# Patient Record
Sex: MALE | Race: WHITE | Employment: FULL TIME | URBAN - NONMETROPOLITAN AREA
[De-identification: names, ages, dates, MRNs, and addresses within clinical notes are randomized per-mention and may not be internally consistent; named-entity substitution may affect disease eponyms.]

---

## 2017-01-20 ENCOUNTER — OFFICE VISIT (OUTPATIENT)
Dept: FAMILY MEDICINE | Facility: OTHER | Age: 47
End: 2017-01-20
Payer: COMMERCIAL

## 2017-01-20 VITALS
OXYGEN SATURATION: 97 % | WEIGHT: 315 LBS | SYSTOLIC BLOOD PRESSURE: 132 MMHG | HEIGHT: 68 IN | TEMPERATURE: 97.6 F | HEART RATE: 73 BPM | DIASTOLIC BLOOD PRESSURE: 88 MMHG | BODY MASS INDEX: 47.74 KG/M2

## 2017-01-20 DIAGNOSIS — Z00.00 ROUTINE GENERAL MEDICAL EXAMINATION AT A HEALTH CARE FACILITY: Primary | ICD-10-CM

## 2017-01-20 DIAGNOSIS — D17.1 BENIGN LIPOMATOUS NEOPLASM OF SKIN AND SUBCUTANEOUS TISSUE OF TRUNK: ICD-10-CM

## 2017-01-20 DIAGNOSIS — G47.33 OSA (OBSTRUCTIVE SLEEP APNEA): ICD-10-CM

## 2017-01-20 DIAGNOSIS — E66.01 MORBID OBESITY DUE TO EXCESS CALORIES (H): ICD-10-CM

## 2017-01-20 LAB
ALBUMIN SERPL-MCNC: 3.8 G/DL (ref 3.4–5)
ALBUMIN UR-MCNC: NEGATIVE MG/DL
ALP SERPL-CCNC: 73 U/L (ref 40–150)
ALT SERPL W P-5'-P-CCNC: 44 U/L (ref 0–70)
ANION GAP SERPL CALCULATED.3IONS-SCNC: 5 MMOL/L (ref 3–14)
APPEARANCE UR: CLEAR
AST SERPL W P-5'-P-CCNC: 20 U/L (ref 0–45)
BILIRUB SERPL-MCNC: 0.5 MG/DL (ref 0.2–1.3)
BILIRUB UR QL STRIP: NEGATIVE
BUN SERPL-MCNC: 18 MG/DL (ref 7–30)
CALCIUM SERPL-MCNC: 9.1 MG/DL (ref 8.5–10.1)
CHLORIDE SERPL-SCNC: 102 MMOL/L (ref 94–109)
CHOLEST SERPL-MCNC: 245 MG/DL
CO2 SERPL-SCNC: 34 MMOL/L (ref 20–32)
COLOR UR AUTO: YELLOW
CREAT SERPL-MCNC: 0.92 MG/DL (ref 0.66–1.25)
ERYTHROCYTE [DISTWIDTH] IN BLOOD BY AUTOMATED COUNT: 12.5 % (ref 10–15)
GFR SERPL CREATININE-BSD FRML MDRD: 89 ML/MIN/1.7M2
GLUCOSE SERPL-MCNC: 92 MG/DL (ref 70–99)
GLUCOSE UR STRIP-MCNC: NEGATIVE MG/DL
HCT VFR BLD AUTO: 45.9 % (ref 40–53)
HDLC SERPL-MCNC: 37 MG/DL
HGB BLD-MCNC: 15.5 G/DL (ref 13.3–17.7)
HGB UR QL STRIP: NEGATIVE
KETONES UR STRIP-MCNC: NEGATIVE MG/DL
LDLC SERPL CALC-MCNC: 164 MG/DL
LEUKOCYTE ESTERASE UR QL STRIP: NEGATIVE
MCH RBC QN AUTO: 29.8 PG (ref 26.5–33)
MCHC RBC AUTO-ENTMCNC: 33.8 G/DL (ref 31.5–36.5)
MCV RBC AUTO: 88 FL (ref 78–100)
NITRATE UR QL: NEGATIVE
NONHDLC SERPL-MCNC: 208 MG/DL
PH UR STRIP: 7.5 PH (ref 5–7)
PLATELET # BLD AUTO: 221 10E9/L (ref 150–450)
POTASSIUM SERPL-SCNC: 4.5 MMOL/L (ref 3.4–5.3)
PROT SERPL-MCNC: 7.3 G/DL (ref 6.8–8.8)
RBC # BLD AUTO: 5.21 10E12/L (ref 4.4–5.9)
SODIUM SERPL-SCNC: 141 MMOL/L (ref 133–144)
SP GR UR STRIP: 1.02 (ref 1–1.03)
TRIGL SERPL-MCNC: 220 MG/DL
URN SPEC COLLECT METH UR: ABNORMAL
UROBILINOGEN UR STRIP-ACNC: 0.2 EU/DL (ref 0.2–1)
WBC # BLD AUTO: 8.1 10E9/L (ref 4–11)

## 2017-01-20 PROCEDURE — 85027 COMPLETE CBC AUTOMATED: CPT | Performed by: INTERNAL MEDICINE

## 2017-01-20 PROCEDURE — 81003 URINALYSIS AUTO W/O SCOPE: CPT | Performed by: INTERNAL MEDICINE

## 2017-01-20 PROCEDURE — 99396 PREV VISIT EST AGE 40-64: CPT | Performed by: INTERNAL MEDICINE

## 2017-01-20 PROCEDURE — 80061 LIPID PANEL: CPT | Performed by: INTERNAL MEDICINE

## 2017-01-20 PROCEDURE — 80053 COMPREHEN METABOLIC PANEL: CPT | Performed by: INTERNAL MEDICINE

## 2017-01-20 PROCEDURE — 36415 COLL VENOUS BLD VENIPUNCTURE: CPT | Performed by: INTERNAL MEDICINE

## 2017-01-20 ASSESSMENT — PAIN SCALES - GENERAL: PAINLEVEL: NO PAIN (0)

## 2017-01-20 NOTE — NURSING NOTE
"Chief Complaint   Patient presents with     Physical       Initial /88 mmHg  Pulse 73  Temp(Src) 97.6  F (36.4  C) (Temporal)  Ht 5' 8\" (1.727 m)  Wt 330 lb 8 oz (149.914 kg)  BMI 50.26 kg/m2  SpO2 97% Estimated body mass index is 50.26 kg/(m^2) as calculated from the following:    Height as of this encounter: 5' 8\" (1.727 m).    Weight as of this encounter: 330 lb 8 oz (149.914 kg).  BP completed using cuff size: mic RODARTE      "

## 2017-01-20 NOTE — PROGRESS NOTES
SUBJECTIVE:     CC: Misbah Floyd is an 46 year old male who presents for preventative health visit.     Healthy Habits:    Do you get at least three servings of calcium containing foods daily (dairy, green leafy vegetables, etc.)? yes    Amount of exercise or daily activities, outside of work: none    Problems taking medications regularly No    Medication side effects: No    Have you had an eye exam in the past two years? yes    Do you see a dentist twice per year? yes    Do you have sleep apnea, excessive snoring or daytime drowsiness?yes- snores        Today's PHQ-2 Score:   PHQ-2 ( 1999 [employer]) 1/20/2017 1/7/2016   Q1: Little interest or pleasure in doing things 0 0   Q2: Feeling down, depressed or hopeless 0 0   PHQ-2 Score 0 0       Abuse: Current or Past(Physical, Sexual or Emotional)- No  Do you feel safe in your environment - Yes    Social History   Substance Use Topics     Smoking status: Never Smoker      Smokeless tobacco: Never Used     Alcohol Use: No     The patient does not drink >3 drinks per day nor >7 drinks per week.    Last PSA: No results found for: PSA    No results for input(s): CHOL, HDL, LDL, TRIG, CHOLHDLRATIO, NHDL in the last 07516 hours.    Reviewed orders with patient. Reviewed health maintenance and updated orders accordingly - Yes    All Histories reviewed and updated in Epic.      ROS:  C: NEGATIVE for fever, chills, change in weight. Patient is frequently tired. He wakes up tired and spends his daytime. He occasionally falls asleep at street lights.  I: NEGATIVE for worrisome rashes, moles or lesions  E: NEGATIVE for vision changes or irritation  ENT: NEGATIVE for ear, mouth and throat problems  R: NEGATIVE for significant cough or SOB  CV: NEGATIVE for chest pain, palpitations or peripheral edema  GI: NEGATIVE for nausea, abdominal pain, heartburn, or change in bowel habits   male: negative for dysuria, hematuria, decreased urinary stream, erectile dysfunction, urethral  "discharge  M: NEGATIVE for significant arthralgias or myalgia  N: NEGATIVE for weakness, dizziness or paresthesias  P: NEGATIVE for changes in mood or affect    Problem list, Medication list, Allergies, and Medical/Social/Surgical histories reviewed in [facility] and updated as appropriate.  Labs reviewed in EPIC  BP Readings from Last 3 Encounters:   01/20/17 132/88   01/07/16 132/80   12/07/11 126/78    Wt Readings from Last 3 Encounters:   01/20/17 330 lb 8 oz (149.914 kg)   01/07/16 323 lb (146.512 kg)   02/15/07 270 lb (122.471 kg)                  OBJECTIVE:     /88 mmHg  Pulse 73  Temp(Src) 97.6  F (36.4  C) (Temporal)  Ht 5' 8\" (1.727 m)  Wt 330 lb 8 oz (149.914 kg)  BMI 50.26 kg/m2  SpO2 97%  EXAM:  GENERAL: healthy, alert and no distress. The patient is morbidly obese.  EYES: Eyes grossly normal to inspection, PERRL and conjunctivae and sclerae normal  HENT: ear canals and TM's normal, nose and mouth without ulcers or lesions. Neck is large with a redundant soft palate  NECK: no adenopathy, no asymmetry, masses, or scars and thyroid normal to palpation  RESP: lungs clear to auscultation - no rales, rhonchi or wheezes  CV: regular rate and rhythm, normal S1 S2, no S3 or S4, no murmur, click or rub, no peripheral edema and peripheral pulses strong  ABDOMEN: soft, nontender, no hepatosplenomegaly, no masses and bowel sounds normal  MS: no gross musculoskeletal defects noted, no edema  SKIN: no suspicious lesions or rashes  NEURO: Normal strength and tone, mentation intact and speech normal  PSYCH: mentation appears normal, affect normal/bright    ASSESSMENT/PLAN:         ICD-10-CM    1. Routine general medical examination at a health care facility Z00.00 LIPID REFLEX TO DIRECT LDL PANEL     Comprehensive metabolic panel     *UA reflex to Microscopic and Culture ([facility] (except [place] and [place])     CBC with platelets   2. CITLALLI (obstructive sleep apnea) G47.33 SLEEP " "EVALUATION & MANAGEMENT REFERRAL - ADULT   3. Morbid obesity due to excess calories (H) E66.01        COUNSELING:  Reviewed preventive health counseling, as reflected in patient instructions       Regular exercise       Healthy diet/nutrition       Vision screening       Hearing screening    BP Screening:   Last 3 BP Readings:    BP Readings from Last 3 Encounters:   01/20/17 132/88   01/07/16 132/80   12/07/11 126/78       The following was recommended to the patient:  Re-screen BP within a year and recommended lifestyle modifications       reports that he has never smoked. He has never used smokeless tobacco.    Estimated body mass index is 50.26 kg/(m^2) as calculated from the following:    Height as of this encounter: 5' 8\" (1.727 m).    Weight as of this encounter: 330 lb 8 oz (149.914 kg).   Weight management plan: Discussed healthy diet and exercise guidelines and patient will follow up in 3 months in clinic to re-evaluate.    Counseling Resources:  ATP IV Guidelines  Pooled Cohorts Equation Calculator  FRAX Risk Assessment  ICSI Preventive Guidelines  Dietary Guidelines for Americans, 2010  USDA's MyPlate  ASA Prophylaxis  Lung CA Screening    Leandro Hollis, DO  [facility]  "

## 2017-01-20 NOTE — MR AVS SNAPSHOT
After Visit Summary   1/20/2017    Misbah Floyd    MRN: [id number]           Patient Information     Date Of Birth          1970        Visit Information        Provider Department      1/20/2017 8:20 AM Leandro Hollis DO [facility]        Today's Diagnoses     Routine general medical examination at a health care facility    -  1     CITLALLI (obstructive sleep apnea)         Morbid obesity due to excess calories (H)           Care Instructions      Preventive Health Recommendations  Male Ages 40 to 49    Yearly exam:             See your health care provider every year in order to  o   Review health changes.   o   Discuss preventive care.    o   Review your medicines if your doctor has prescribed any.    You should be tested each year for STDs (sexually transmitted diseases) if you re at risk.     Have a cholesterol test every 5 years.     Have a colonoscopy (test for colon cancer) if someone in your family has had colon cancer or polyps before age 50.     After age 45, have a diabetes test (fasting glucose). If you are at risk for diabetes, you should have this test every 3 years.      Talk with your health care provider about whether or not a prostate cancer screening test (PSA) is right for you.    Shots: Get a flu shot each year. Get a tetanus shot every 10 years.     Nutrition:    Eat at least 5 servings of fruits and vegetables daily.     Eat whole-grain bread, whole-wheat pasta and brown rice instead of white grains and rice.     Talk to your provider about Calcium and Vitamin D.     Lifestyle    Exercise for at least 150 minutes a week (30 minutes a day, 5 days a week). This will help you control your weight and prevent disease.     Limit alcohol to one drink per day.     No smoking.     Wear sunscreen to prevent skin cancer.     See your dentist every six months for an exam and cleaning.            Follow-ups after your visit        Additional Services     SLEEP  EVALUATION & MANAGEMENT REFERRAL - ADULT       Please be aware that coverage of these services is subject to the terms and limitations of your health insurance plan.  Call member services at your health plan with any benefit or coverage questions.      Please bring the following to your appointment:    >>   List of current medications   >>   This referral request   >>   Any documents/labs given to you for this referral    [facility] [phone]  (Age 13 if over 100 lbs and up)                  Your next 10 appointments already scheduled     Jan 30, 2017 11:00 AM   New Sleep Patient with Michael Goldsmith PA-C   [facility] ([facility])    [address] [address] [phone]              Future tests that were ordered for you today     Open Future Orders        Priority Expected Expires Ordered    SLEEP EVALUATION & MANAGEMENT REFERRAL - ADULT Routine  1/20/2018 1/20/2017            Who to contact     If you have questions or need follow up information about today's clinic visit or your schedule please contact [facility] directly at [phone].  Normal or non-critical lab and imaging results will be communicated to you by Drive YOYOhart, letter or phone within 4 business days after the clinic has received the results. If you do not hear from us within 7 days, please contact the clinic through [facility] or phone. If you have a critical or abnormal lab result, we will notify you by phone as soon as possible.  Submit refill requests through Storie or call your pharmacy and they will forward the refill request to us. Please allow 3 business days for your refill to be completed.          Additional Information About Your Visit        Drive [facility]OY[facility]ar[facility] Information     Storie lets you send messages to your doctor, view your test results, renew your prescriptions, schedule appointments and more. To sign up, go to [email or web address]  ". Click on \"Log in\" on the left side of the screen, which will take you to the Welcome page. Then click on \"Sign up Now\" on the right side of the page.     You will be asked to enter the access code listed below, as well as some personal information. Please follow the directions to create your username and password.     Your access code is: [email or web address]  Expires: 2017  9:02 AM     Your access code will  in 90 days. If you need help or a new code, please call your [place] clinic or [phone].        Care EveryWhere ID     This is your Care EveryWhere ID. This could be used by other organizations to access your [place] medical records  [id number]        Your Vitals Were     Pulse Temperature Height BMI (Body Mass Index) Pulse Oximetry       73 97.6  F (36.4  C) (Temporal) 5' 8\" (1.727 m) 50.26 kg/m2 97%        Blood Pressure from Last 3 Encounters:   17 132/88   16 132/80   11 126/78    Weight from Last 3 Encounters:   17 330 lb 8 oz (149.914 kg)   16 323 lb (146.512 kg)   02/15/07 270 lb (122.471 kg)              We Performed the Following     *UA reflex to Microscopic and Culture ([facility] and [facility] (except [place] and [place])     CBC with platelets     Comprehensive metabolic panel     LIPID REFLEX TO DIRECT LDL PANEL        Primary Care Provider Office Phone # Fax #    Tawanda Aguayo [phone]       [facility] [address] [facility] [address]        Thank you!     Thank you for choosing [facility]  for your care. Our goal is always to provide you with excellent care. Hearing back from our patients is one way we can continue to improve our services. Please take a few minutes to complete the written survey that you may receive in the mail after your visit with us. Thank you!             Your Updated Medication List - Protect others around you: Learn how to safely use, store and throw away " your medicines at www.disposemymeds.org.      Notice  As of 1/20/2017  9:02 AM    You have not been prescribed any medications.

## 2017-01-25 NOTE — PROGRESS NOTES
Quick Note:    Please contact the patient and notify him of the following:  The urine sample is negative.  Cholesterol is elevated with an LDL of 164.  Chemistry test demonstrates normal minerals, kidney and liver function.  Blood cell count is normal. No anemia is seen.  Thank you.  DO TORI Muniz    ______

## 2017-01-30 ENCOUNTER — OFFICE VISIT (OUTPATIENT)
Dept: SURGERY | Facility: CLINIC | Age: 47
End: 2017-01-30
Payer: COMMERCIAL

## 2017-01-30 ENCOUNTER — OFFICE VISIT (OUTPATIENT)
Dept: SLEEP MEDICINE | Facility: CLINIC | Age: 47
End: 2017-01-30
Payer: COMMERCIAL

## 2017-01-30 VITALS
HEIGHT: 68 IN | HEART RATE: 67 BPM | DIASTOLIC BLOOD PRESSURE: 73 MMHG | SYSTOLIC BLOOD PRESSURE: 126 MMHG | OXYGEN SATURATION: 96 % | BODY MASS INDEX: 47.74 KG/M2 | WEIGHT: 315 LBS

## 2017-01-30 VITALS — BODY MASS INDEX: 47.74 KG/M2 | HEIGHT: 68 IN | HEART RATE: 96 BPM | WEIGHT: 315 LBS | OXYGEN SATURATION: 97 %

## 2017-01-30 DIAGNOSIS — G47.33 OSA (OBSTRUCTIVE SLEEP APNEA): ICD-10-CM

## 2017-01-30 DIAGNOSIS — E66.01 MORBID OBESITY DUE TO EXCESS CALORIES (H): ICD-10-CM

## 2017-01-30 DIAGNOSIS — R22.41 MASS OF RIGHT HIP REGION: Primary | ICD-10-CM

## 2017-01-30 PROCEDURE — 99203 OFFICE O/P NEW LOW 30 MIN: CPT | Performed by: SPECIALIST

## 2017-01-30 PROCEDURE — 99204 OFFICE O/P NEW MOD 45 MIN: CPT | Performed by: PHYSICIAN ASSISTANT

## 2017-01-30 ASSESSMENT — PAIN SCALES - GENERAL: PAINLEVEL: NO PAIN (0)

## 2017-01-30 NOTE — PROGRESS NOTES
Consult requested by Dr. Hollis    Reason for consultation - right hip mass    HPI:  Patient is a 46-year-old white male with a long-standing history of right hip mass. He states it started out small and has been slowly growing ever since. He now wishes to have it excised. Denies any pain fevers chills or drainage from the site. He now presents for evaluation of right hip mass.    No past medical history on file.    Past Surgical History   Procedure Laterality Date     Hc cystourethroscopy  04/21/2006     Stone basket extraction. Left double-J stent placemnt.     No current outpatient prescriptions on file.     No current facility-administered medications for this visit.        Allergies   Allergen Reactions     Bees Swelling     Social History   Substance Use Topics     Smoking status: Never Smoker      Smokeless tobacco: Never Used     Alcohol Use: No     No family history on file.     ROS: 10 point ROS neg other than the symptoms noted above in the HPI.    PE:  B/P: Data Unavailable, T: Data Unavailable, P: 96, R: Data Unavailable  General: well developed, well nourished obese WM  who appears his stated age  HEENT: NC/AT, EOMI, (-)icterus, (-)injection  Neck: Supple, No JVD  Chest: CTA  Heart: S1, S2, (-)m/r/g  Abd: Soft, non tender, non distended  Ext; Warm, no edema, right hip 44u12v2 cm sq mass, soft, mobile, non tender.  Psych: AAOx3  Neuro: No focal deficits      Impression/plan:  This is a 46-year-old gentleman with a right hip mass that is most likely a lipoma. After discussion with the patient the plan at this time is for an excision under general anesthesia. The procedure, risks, benefits and alternatives were discussed and he agrees to proceed. He'll reschedule near future.    Solis Lopes MD, FACS

## 2017-01-30 NOTE — PROGRESS NOTES
Sleep Consultation:    Date on this visit: 1/30/2017    Misbah Floyd is sent by No ref. provider found for a sleep consultation regarding snoring, daytime sleepiness. .    Primary Physician: Tawanda Aguayo     Misbah Floyd reports nightly snoring, gasping and snorting for many years.     Misbah goes to sleep at 9:00 PM during the week. He wakes up at 3:00AM with an alarm. He falls asleep in 5 minutes.  Misbah denies difficulty falling asleep.  He wakes up 1-2 times a night for 5 minutes before falling back to sleep.  Misbah wakes up to go to the bathroom.  On weekends, Misbah goes to sleep at 9:00 PM.  He wakes up at 8:00 AM without an alarm. He falls asleep in 5 minutes.  Patient gets an average of 5-6 hours of sleep per night.     Patient does not use electronics in bed, watch TV in bed and read in bed.     Misbah does not do shift work.  He works day shifts.      Misbah does snore every night. Patient does have a regular bed partner. There is report of snoring, gasping, choking and snorting.  He does have witnessed apneas. They never sleep separately.  Patient sleeps on his back and side. He has occasional snort arousals, morning dry mouth and morning headaches,. Misbah has no bruxism, sleep walking, sleep talking, dream enactment, sleep paralysis, cataplexy and hypnogogic/hypnopompic hallucinations.    He denies sleep walking, sleep talking, enuresis and sleep terrors as a child.  Misbah denies difficulty breathing through his and her nose, claustrophobia, reflux at night, heartburn and depression.      Misbah has gained 0-5 pounds in the last year.  Patient describes themself as a morning person.  He would prefer to go to sleep at 9:00 PM and wake up at 6:30 AM.  Patient's [place] Sleepiness score 9/24 inconsistent with excessive  daytime sleepiness.      Misbah naps 0 times per day He takes no inadvertant naps.  He admits closing eyes and dozing while driving. The most recent episode was 1  week(s) ago.  Patient was counseled on the importance of driving while alert, to pull over if drowsy, or nap before getting into the vehicle if sleepy.  He uses 2-3 sodas/day. Last caffeine intake is usually before 5 PM.    Allergies:    Allergies   Allergen Reactions     Bees Swelling       Medications:    No current outpatient prescriptions on file.       Problem List:  Patient Active Problem List    Diagnosis Date Noted     Morbid obesity due to excess calories (H) 01/20/2017     Priority: Medium     CITLALLI (obstructive sleep apnea) 01/20/2017     Priority: Medium     Abscess of anal and rectal regions 02/19/2007        Past Medical/Surgical History:  History reviewed. No pertinent past medical history.  Past Surgical History   Procedure Laterality Date     Hc cystourethroscopy  04/21/2006     Stone basket extraction. Left double-J stent placemnt.       Social History:  Social History     Social History     Marital Status:      Spouse Name: N/A     Number of Children: N/A     Years of Education: N/A     Occupational History     Not on file.     Social History Main Topics     Smoking status: Never Smoker      Smokeless tobacco: Never Used     Alcohol Use: No     Drug Use: No     Sexual Activity:     Partners: Female     Birth Control/ Protection: None, Male Surgical     Other Topics Concern     Not on file     Social History Narrative       Family History:  History reviewed. No pertinent family history.    Review of Systems:  A complete review of systems reviewed by me is negative with the exeption of what has been mentioned in the history of present illness.  CONSTITUTIONAL:  POSITIVE for  weight gain  EYES: NEGATIVE for changes in vision, blind spots, double vision.  ENT: NEGATIVE for ear pain, sore throat, sinus pain, post-nasal drip, runny nose, bloody nose  CARDIAC: NEGATIVE for fast heartbeats or fluttering in chest, chest pain or pressure, breathlessness when lying flat, swollen legs or swollen  "feet.  NEUROLOGIC: NEGATIVE headaches, weakness or numbness in the arms or legs.  DERMATOLOGIC:  POSITIVE for  rashes  PULMONARY: NEGATIVE SOB at rest, SOB with activity, dry cough, productive cough, coughing up blood, wheezing or whistling when breathing.    GASTROINTESTINAL: NEGATIVE for nausea or vomitting, loose or watery stools, fat or grease in stools, constipation, abdominal pain, bowel movements black in color or blood noted.  GENITOURINARY: NEGATIVE for pain during urination, blood in urine, urinating more frequently than usual, irregular menstrual periods.  MUSCULOSKELETAL: NEGATIVE for muscle pain, bone or joint pain, swollen joints.  ENDOCRINE: NEGATIVE for increased thirst or urination, diabetes.  LYMPHATIC: NEGATIVE for swollen lymph nodes, lumps or bumps in the breasts or nipple discharge.    Physical Examination:  Vitals: /73 mmHg  Pulse 67  Ht 1.727 m (5' 8\")  Wt 149.687 kg (330 lb)  BMI 50.19 kg/m2  SpO2 96%  BMI= Body mass index is 50.19 kg/(m^2).         No flowsheet data found.    GENERAL APPEARANCE: healthy, alert, no distress and over weight  HENT: nose and mouth without ulcers or lesions and oropharynx crowded  NECK: no adenopathy, no asymmetry, masses, or scars, thyroid normal to palpation and trachea midline and normal to palpation  RESP: lungs clear to auscultation - no rales, rhonchi or wheezes  CV: regular rates and rhythm, normal S1 S2, no S3 or S4 and no murmur, click or rub  MS: extremities normal- no gross deformities noted  PSYCH: mentation appears normal and affect normal/bright  Mallampati Class: III.  Tonsillar Stage: 1  hidden by pillars.    Impression/Plan:  Due to BMI, snoring, witnessed apneas will set up a split night with TCM monitoring.   Literature provided regarding sleep apnea.      He will follow up with me in approximately two weeks after his sleep study has been competed to review the results and discuss plan of care.       Polysomnography " reviewed.  Obstructive sleep apnea reviewed.  Complications of untreated sleep apnea were reviewed.        CC: No ref. provider found

## 2017-01-30 NOTE — PATIENT INSTRUCTIONS
"MY TREATMENT INFORMATION FOR SLEEP APNEA-  Misbah Floyd    DOCTOR : Michael Aguirre  SLEEP CENTER :      MY CONTACT NUMBER:       If I haven't had a sleep study yet, what can I expect?  A personal story from Horacio  https://www.[employer]ouchsurfing[employer].com/watch?v=AxPLmlRpnCs    Am I having a home sleep study?  Here is a video in case you get home and want to make sure you have done it correctly  https://www.[employer]ouchsurfing[employer].com/watch?v=GNI9L4gDpg3&feature=youtu.be    Frequently asked questions:  1. What is Obstructive Sleep Apnea (CITLALLI)? CITLALLI is the most common type of sleep apnea. Apnea literally means, \"without breath.\" It is characterized by repetitive pauses in breathing, despite continued effort to breathe, and is usually associated with a reduction in blood oxygen saturation. Apneas can last 10 to over 60 seconds. It is caused by narrowing or collapse of the upper airway as muscles relax during sleep. Severity of sleep apnea is determined by frequency of breathing events and their effect on your sleep and oxygen levels determined during sleep testing.   2. What are the consequences of CITLALLI? Symptoms include: daytime sleepiness- possibly increasing the risk of falling asleep while driving, unrefreshing/restless sleep, snoring, insomnia, waking frequently to urinate, waking with heartburn or reflux, reduced concentration and memory, and morning headaches. Other health consequences may include development of high blood pressure and other cardiovascular disease in persons who are susceptible. Untreated CITLALLI  can contribute to heart disease, stroke and diabetes.   3. What are the treatment options? In most situations, sleep apnea is a lifelong disease that must be managed with daily therapy. Medications are not effective for sleep apnea and surgery is generally not performed until other therapies have been tried. Therapy is usually tailored to the individual patient based on many factors including your wishes as well as severity of sleep " apnea and severity of obesity. Continuous Positive Airway (CPAP) is the most reliable treatment. An oral device to hold your jaw forward is usually the next most reliable option. Other options include postioning devices (to keep you off your back), weight loss, and surgery including a tongue pacing device. There is more detail about some of these options below.            1. CPAP-  WHAT DOES IT DO AND HOW CAN I LEARN TO WEAR IT?                               BEFORE I START, CAN I WATCH A MOVIE TO GET A PLAN ON HOW TO USE CPAP?  https://www.[employer].com/watch?m=t4P54cz363I      Continuous positive airway pressure, or CPAP, is the most effective treatment for obstructive sleep apnea. It works by blowing room air, through a mask, to hold your throat open. A decision to use CPAP is a major step forward in the pursuit of a healthier life. The successful use of CPAP will help you breathe easier, sleep better and live healthier. You can choose CPAP equipment from any durable medical equipment provider that meets your needs.  Using CPAP can be a positive experience if you keep these oreilly points in mind:  1. Commitment  CPAP is not a quick fix for your problem. It involves a long-term commitment to improve your sleep and your health.    2. Communication  Stay in close communication with both your sleep doctor and your CPAP supplier. Ask lots of questions and seek help when you need it.    3. Consistency  Use CPAP all night, every night and for every nap. You will receive the maximum health benefits from CPAP when you use it every time that you sleep. This will also make it easier for your body to adjust to the treatment.    4. Correction  The first machine and mask that you try may not be the best ones for you. Work with your sleep doctor and your CPAP supplier to make corrections to your equipment selection. Ask about trying a different type of machine or mask if you have ongoing problems. Make sure that your mask is a good  "fit and learn to use your equipment properly.    5. Challenge  Tell a family member or close friend to ask you each morning if you used your CPAP the previous night. Have someone to challenge you to give it your best effort.    6. Connection   Your adjustment to CPAP will be easier if you are able to connect with others who use the same treatment. Ask your sleep doctor if there is a support group in your area for people who have sleep apnea, or look for one on the Internet.  7. Comfort   Increase your level of comfort by using a saline spray, decongestant or heated humidifier if CPAP irritates your nose, mouth or throat. Use your unit's \"ramp\" setting to slowly get used to the air pressure level. There may be soft pads you can buy that will fit over your mask straps. Look on www.CPAP.com for accessories that can help make CPAP use more comfortable.  8. Cleaning   Clean your mask, tubing and headgear on a regular basis. Put this time in your schedule so that you don't forget to do it. Check and replace the filters for your CPAP unit and humidifier.    9. Completion   Although you are never finished with CPAP therapy, you should reward yourself by celebrating the completion of your first month of treatment. Expect this first month to be your hardest period of adjustment. It will involve some trial and error as you find the machine, mask and pressure settings that are right for you.    10. Continuation  After your first month of treatment, continue to make a daily commitment to use your CPAP all night, every night and for every nap.    CPAP-Tips to starting with success:  Begin using your CPAP for short periods of time during the day while you watch TV or read.    Use CPAP every night and for every nap. Using it less often reduces the health benefits and makes it harder for your body to get used to it.    Make small adjustments to your mask, tubing, straps and headgear until you get the right fit. Tightening the mask " may actually worsen the leak.  If it leaves significant marks on your face or irritates the bridge of your nose, it may not be the best mask for you.  Speak with the person who supplied the mask and consider trying other masks. Insurances will allow you to try different masks during the first month of starting CPAP.  Insurance also covers a new mask, hose and filter about every 6 months.    Use a saline nasal spray to ease mild nasal congestion. Neti-Pot or saline nasal rinses may also help. Nasal gel sprays can help reduce nasal dryness.  Biotene mouthwash can be helpful to protect your teeth if you experience frequent dry mouth.  Dry mouth may be a sign of air escaping out of your mouth or out of the mask in the case of a full face mask.  Speak with your provider if you expect that is the case.     Take a nasal decongestant to relieve more severe nasal or sinus congestion.  Do not use Afrin (oxymetazoline) nasal spray more than 3 days in a row.  Speak with your sleep doctor if your nasal congestion is chronic.    Use a heated humidifier that fits your CPAP model to enhance your breathing comfort. Adjust the heat setting up if you get a dry nose or throat, down if you get condensation in the hose or mask.  Position the CPAP lower than you so that any condensation in the hose drains back into the machine rather than towards the mask.    Try a system that uses nasal pillows if traditional masks give you problems.    Clean your mask, tubing and headgear once a week. Make sure the equipment dries fully.    Regularly check and replace the filters for your CPAP unit and humidifier.    Work closely with your sleep provider and your CPAP supplier to make sure that you have the machine, mask and air pressure setting that works best for you. It is better to stop using it and call your provider to solve problems than to lay awake all night frustrated with the device.    BESIDES CPAP, WHAT OTHER THERAPIES ARE THERE?       Positioning Device  Positioning devices are generally used when sleep apnea is mild and only occurs on your back.This example shows a pillow that straps around the waist. It may be appropriate for those whose sleep study shows milder sleep apnea that occurs primarily when lying flat on one's back. Preliminary studies have shown benefit but effectiveness at home may need to be verified by a home sleep test. These devices are generally not covered by medical insurance.                      Oral Appliance  What is oral appliance therapy?  An oral appliance is a small acrylic device that fits over the upper and lower teeth or tongue (similar to an orthodontic retainer or a mouth guard). This device slightly advances the lower jaw or tongue, which moves the base of the tongue forward, opens the airway, improves breathing and can effectively treat snoring and obstructive sleep apnea sleep apnea. The appliance is fabricated and customized by a qualified dentist with experience in treating snoring and sleep apnea. Oral appliances are usually well tolerated and have relatively high compliance by patients1, 2, 3.  When is an oral appliance indicated?  Oral appliance therapy is recommended as a first-line treatment for patients with primary snoring, mild sleep apnea, and for patients with moderate sleep apnea who prefer appliance therapy to use of CPAP4, 5. Severity of sleep apnea is determined by sleep testing and is based on the number of respiratory events per hour of sleep.   How successful is oral appliance therapy?  The success rate of oral appliance therapy in patients with mild sleep apnea is 75-80% while in patients with moderate sleep apnea it is 50-70%. The chance of success in patients with severe sleep apnea is 40-50%. The research also shows that oral appliances have a beneficial effect on the cardiovascular health of CITLALLI patients at the same magnitude as CPAP therapy7.  Oral appliances should be a second-line  treatment in cases of severe sleep apnea, but if not completely successful then a combination therapy utilizing CPAP plus oral appliance therapy may be effective. Oral appliances tend to be effective in a broad range of patients although studies show that the patients who have the highest success are females, younger patients, those with milder disease, and less severe obesity. 3, 6.   The chances of success are lower in patients who have more severe CITLALLI, are older, and those who are morbidly obese.     Example of an oral appliance   Finding a dentist that practices dental sleep medicine  Specific training is available through the American Academy of Dental Sleep Medicine for dentists interested in working in the field of sleep. To find a dentist who is educated in the field of sleep and the use of oral appliances, near you, visit the Web site of the American Academy of Dental Sleep Medicine; also see   http://www.accpstorage.org/newOrganization/patients/oralAppliances.pdf  To search for a dentist certified in these practices:  Http://aadsm.org/FindADentist.aspx?1  1. Britt et al. Objectively measured vs self-reported compliance during oral appliance therapy for sleep-disordered breathing. Chest 2013; 144(5): 0354-6597.  2. Roxanna, et al. Objective measurement of compliance during oral appliance therapy for sleep-disordered breathing. Thorax 2013; 68(1): 91-96.  3. Chantale et al. Mandibular advancement devices in 620 men and women with CITLALLI and snoring: tolerability and predictors of treatment success. Chest 2004; 125: 7246-6015.  4. Yates, et al. Oral appliances for snoring and CITLALLI: a review. Sleep 2006; 29: 244-262.  5. Julia, et al. Oral appliance treatment for CITLALLI: an update. J Clin Sleep Med 2014; 10(2): 215-227.  6. Aristides, et al. Predictors of OSAH treatment outcome. J Dent Res 2007; 86: 2699-5080.      Weight Loss:    Weight management is a personal decision.  If you are interested in  exploring weight loss strategies, the following discussion covers the impact on weight loss on sleep apnea and the approaches that may be successful.    Weight loss decreases severity of sleep apnea in most people with obesity. For those with mild obesity who have developed snoring with weight gain, even 15-30 pound weight loss can improve and occasionally eliminate sleep apnea.  Structured and life-long dietary and health habits are necessary to lose weight and keep healthier weight levels.     Though there may be significant health benefits from weight loss, long-term weight loss is very difficult to achieve- studies show success with dietary management in less than 10% of people. In addition, substantial weight loss may require years of dietary control and may be difficult if patients have severe obesity. In these cases, surgical management may be considered.  Finally, older individuals who have tolerated obesity without health complications may be less likely to benefit from weight loss strategies.    Your BMI is Body mass index is 50.19 kg/(m^2).  Body mass index (BMI) is one way to tell whether you are at a healthy weight, overweight, or obese. It measures your weight in relation to your height.  A BMI of 18.5 to 24.9 is in the healthy range. A person with a BMI of 25 to 29.9 is considered overweight, and someone with a BMI of 30 or greater is considered obese. More than two-thirds of American adults are considered overweight or obese.  Being overweight or obese increases the risk for further weight gain. Excess weight may lead to heart disease and diabetes.  Creating and following plans for healthy eating and physical activity may help you improve your health.  Weight control is part of healthy lifestyle and includes exercise, emotional health, and healthy eating habits. Careful eating habits lifelong are the mainstay of weight control. Though there are significant health benefits from weight loss, long-term  weight loss with diet alone may be very difficult to achieve- studies show long-term success with dietary management in less than 10% of people. Attaining a healthy weight may be especially difficult to achieve in those with severe obesity. In some cases, medications, devices and surgical management might be considered.  What can you do?  If you are overweight or obese and are interested in methods for weight loss, you should discuss this with your provider.     Consider reducing daily calorie intake by 500 calories.     Keep a food journal.     Avoiding skipping meals, consider cutting portions instead.    Diet combined with exercise helps maintain muscle while optimizing fat loss. Strength training is particularly important for building and maintaining muscle mass. Exercise helps reduce stress, increase energy, and improves fitness. Increasing exercise without diet control, however, may not burn enough calories to loose weight.       Start walking three days a week 10-20 minutes at a time    Work towards walking thirty minutes five days a week     Eventually, increase the speed of your walking for 1-2 minutes at time    In addition, we recommend that you review healthy lifestyles and methods for weight loss available through the National Institutes of Health patient information sites:  http://win.niddk.nih.gov/publications/index.htm    And look into health and wellness programs that may be available through your health insurance provider, employer, local community center, or ed[employer]arda club.    Weight management plan: Patient was referred to their PCP to discuss a diet and exercise plan.    Surgery:    Upper Airway Surgery for CITLALLI  Surgery for CITLALLI is a second-line treatment option in the management of sleep apnea.  Surgery should be considered for patients who are having a difficult time tolerating CPAP.    Surgery for CITLALLI is directed at areas that are responsible for narrowing or complete obstruction of the airway  during sleep.  There are a wide range of procedures available to enlarge and/or stabilize the airway to prevent blockage of breathing in the three major areas where it can occur: the palate, tongue, and nasal regions.  Successful surgical treatment depends on the accurate identification of the factors responsible for obstructive sleep apnea in each person.  A personalized approach is required because there is no single treatment that works well for everyone.  Because of anatomic variation, consultation with an examination by a sleep surgeon is a critical first step in determining what surgical options are best for each patient.  In some cases, examination during sedation may be recommended in order to guide the selection of procedures.  Patients will be counseled about risks and benefits as well as the typical recovery course after surgery. Surgery is typically not a cure for a person s CITLALLI.  However, surgery will often significantly improve one s CITLALLI severity (termed  success rate ).  Even in the absence of a cure, surgery will decrease the cardiovascular risk associated with OSA7; improve overall quality of life8 (sleepiness, functionality, sleep quality, etc).          Palate Procedures:  Patients with CITLALLI often have narrowing of their airway in the region of their tonsils and uvula.  The goals of palate procedures are to widen the airway in this region as well as to help the tissues resist collapse.  Modern palate procedure techniques focus on tissue conservation and soft tissue rearrangement, rather than tissue removal.  Often the uvula is preserved in this procedure. Residual sleep apnea is common in patient after pharyngoplasty with an average reduction in sleep apnea events of 33%2.      Tongue Procedures:  While patients are awake, the muscles that surround the throat are active and keep this region open for breathing. These muscles relax during sleep, allowing the tongue and other structures to collapse  and block breathing.  There are several different tongue procedures available.  Selection of a tongue base procedure depends on characteristics seen on physical exam.  Generally, procedures are aimed at removing bulky tissues in this area or preventing the back of the tongue from falling back during sleep.  Success rates for tongue surgery range from 50-62%3.    Hypoglossal Nerve Stimulation:  Hypoglossal nerve stimulation has recently received approval from the [place] Food and Drug Administration for the treatment of obstructive sleep apnea.  This is based on research showing that the system was safe and effective in treating sleep apnea6.  Results showed that the median AHI score decreased 68%, from 29.3 to 9.0. This therapy uses an implant system that senses breathing patterns and delivers mild stimulation to airway muscles, which keeps the airway open during sleep.  The system consists of three fully implanted components: a small generator (similar in size to a pacemaker), a breathing sensor, and a stimulation lead.  Using a small handheld remote, a patient turns the therapy on before bed and off upon awakening.    Candidates for this device must be greater than 22 years of age, have moderate to severe CITLALLI (AHI between 20-65), BMI less than 32, have tried CPAP/oral appliance without success, and have appropriate upper airway anatomy (determined by a sleep endoscopy performed by Dr. Grullon).    Hypoglossal Nerve Stimulation Pathway:    The sleep surgeon s office will work with the patient through the insurance prior-authorization process (including communications and appeals).    Nasal Procedures:  Nasal obstruction can interfere with nasal breathing during the day and night.  Studies have shown that relief of nasal obstruction can improve the ability of some patients to tolerate positive airway pressure therapy for obstructive sleep apnea1.  Treatment options include medications such as nasal saline,  topical corticosteroid and antihistamine sprays, and oral medications such as antihistamines or decongestants. Non-surgical treatments can include external nasal dilators for selected patients. If these are not successful by themselves, surgery can improve the nasal airway either alone or in combination with these other options.      Combination Procedures:  Combination of surgical procedures and other treatments may be recommended, particularly if patients have more than one area of narrowing or persistent positional disease.  The success rate of combination surgery ranges from 66-80%2,3.      1. Pancho TARIQ. The Role of the Nose in Snoring and Obstructive Sleep Apnoea: An Update.  Eur Arch Otorhinolaryngol. 2011; 268: 1365-73.  2.  Colby SM; Julee JA; Mitzy JR; Pallanch JF; Freida MB; Lizet SG; Igor RDZ. Surgical modifications of the upper airway for obstructive sleep apnea in adults: a systematic review and meta-analysis. SLEEP 2010;33(10):1766-6399. Geovanna SMITH. Hypopharyngeal surgery in obstructive sleep apnea: an evidence-based medicine review.  Arch Otolaryngol Head Neck Surg. 2006 Feb;132(2):206-13.  3. Huy YH1, Berto Y, Franklyn PEG. The efficacy of anatomically based multilevel surgery for obstructive sleep apnea. Otolaryngol Head Neck Surg. 2003 Oct;129(4):327-35.  4. Geovanna SMITH, Goldberg A. Hypopharyngeal Surgery in Obstructive Sleep Apnea: An Evidence-Based Medicine Review. Arch Otolaryngol Head Neck Surg. 2006 Feb;132(2):206-13.  5. Jason SWEENEY et al. Upper-Airway Stimulation for Obstructive Sleep Apnea.  N Engl J Med. 2014 Jan 9;370(2):139-49.  6. Pesurekha Y et al. Increased Incidence of Cardiovascular Disease in Middle-aged Men with Obstructive Sleep Apnea. Am J Respir Crit Care Med; 2002 166: 159-165  7. Jaron UMANZOR et al. Studying Life Effects and Effectiveness of Palatopharyngoplasty (SLEEP) study: Subjective Outcomes of Isolated Uvulopalatopharyngoplasty. Otolaryngol Head Neck Surg. 2011; 144:  982-888.

## 2017-01-30 NOTE — MR AVS SNAPSHOT
"              After Visit Summary   1/30/2017    Misbah Floyd    MRN: [id number]           Patient Information     Date Of Birth          1970        Visit Information        Provider Department      1/30/2017 11:00 AM Michael Goldsmith PA-C [facility]        Today's Diagnoses     CITLALLI (obstructive sleep apnea)           Care Instructions    MY TREATMENT INFORMATION FOR SLEEP APNEA-  Misbah Floyd    DOCTOR : Michael Goldsmith  SLEEP CENTER :      MY CONTACT NUMBER:       If I haven't had a sleep study yet, what can I expect?  A personal story from [employer]  https://www.[employer].com/watch?v=AxPLmlRpnCs    Am I having a home sleep study?  Here is a video in case you get home and want to make sure you have done it correctly  https://www.Mint.com/watch?v=IJB5A8aUkk1&feature=youtu.be    Frequently asked questions:  1. What is Obstructive Sleep Apnea (CITLALLI)? CITLALLI is the most common type of sleep apnea. Apnea literally means, \"without breath.\" It is characterized by repetitive pauses in breathing, despite continued effort to breathe, and is usually associated with a reduction in blood oxygen saturation. Apneas can last 10 to over 60 seconds. It is caused by narrowing or collapse of the upper airway as muscles relax during sleep. Severity of sleep apnea is determined by frequency of breathing events and their effect on your sleep and oxygen levels determined during sleep testing.   2. What are the consequences of CITLALLI? Symptoms include: daytime sleepiness- possibly increasing the risk of falling asleep while driving, unrefreshing/restless sleep, snoring, insomnia, waking frequently to urinate, waking with heartburn or reflux, reduced concentration and memory, and morning headaches. Other health consequences may include development of high blood pressure and other cardiovascular disease in persons who are susceptible. Untreated CITLALLI  can contribute to heart disease, stroke and diabetes.   3. What " are the treatment options? In most situations, sleep apnea is a lifelong disease that must be managed with daily therapy. Medications are not effective for sleep apnea and surgery is generally not performed until other therapies have been tried. Therapy is usually tailored to the individual patient based on many factors including your wishes as well as severity of sleep apnea and severity of obesity. Continuous Positive Airway (CPAP) is the most reliable treatment. An oral device to hold your jaw forward is usually the next most reliable option. Other options include postioning devices (to keep you off your back), weight loss, and surgery including a tongue pacing device. There is more detail about some of these options below.            1. CPAP-  WHAT DOES IT DO AND HOW CAN I LEARN TO WEAR IT?                               BEFORE I START, CAN I WATCH A MOVIE TO GET A PLAN ON HOW TO USE CPAP?  https://www.[employer].com/watch?h=r3O22fv024F      Continuous positive airway pressure, or CPAP, is the most effective treatment for obstructive sleep apnea. It works by blowing room air, through a mask, to hold your throat open. A decision to use CPAP is a major step forward in the pursuit of a healthier life. The successful use of CPAP will help you breathe easier, sleep better and live healthier. You can choose CPAP equipment from any durable medical equipment provider that meets your needs.  Using CPAP can be a positive experience if you keep these oreilly points in mind:  1. Commitment  CPAP is not a quick fix for your problem. It involves a long-term commitment to improve your sleep and your health.    2. Communication  Stay in close communication with both your sleep doctor and your CPAP supplier. Ask lots of questions and seek help when you need it.    3. Consistency  Use CPAP all night, every night and for every nap. You will receive the maximum health benefits from CPAP when you use it every time that you sleep. This will  "also make it easier for your body to adjust to the treatment.    4. Correction  The first machine and mask that you try may not be the best ones for you. Work with your sleep doctor and your CPAP supplier to make corrections to your equipment selection. Ask about trying a different type of machine or mask if you have ongoing problems. Make sure that your mask is a good fit and learn to use your equipment properly.    5. Challenge  Tell a family member or close friend to ask you each morning if you used your CPAP the previous night. Have someone to challenge you to give it your best effort.    6. Connection   Your adjustment to CPAP will be easier if you are able to connect with others who use the same treatment. Ask your sleep doctor if there is a support group in your area for people who have sleep apnea, or look for one on the Internet.  7. Comfort   Increase your level of comfort by using a saline spray, decongestant or heated humidifier if CPAP irritates your nose, mouth or throat. Use your unit's \"ramp\" setting to slowly get used to the air pressure level. There may be soft pads you can buy that will fit over your mask straps. Look on www.CPAP.com for accessories that can help make CPAP use more comfortable.  8. Cleaning   Clean your mask, tubing and headgear on a regular basis. Put this time in your schedule so that you don't forget to do it. Check and replace the filters for your CPAP unit and humidifier.    9. Completion   Although you are never finished with CPAP therapy, you should reward yourself by celebrating the completion of your first month of treatment. Expect this first month to be your hardest period of adjustment. It will involve some trial and error as you find the machine, mask and pressure settings that are right for you.    10. Continuation  After your first month of treatment, continue to make a daily commitment to use your CPAP all night, every night and for every nap.    CPAP-Tips to " starting with success:  Begin using your CPAP for short periods of time during the day while you watch TV or read.    Use CPAP every night and for every nap. Using it less often reduces the health benefits and makes it harder for your body to get used to it.    Make small adjustments to your mask, tubing, straps and headgear until you get the right fit. Tightening the mask may actually worsen the leak.  If it leaves significant marks on your face or irritates the bridge of your nose, it may not be the best mask for you.  Speak with the person who supplied the mask and consider trying other masks. Insurances will allow you to try different masks during the first month of starting CPAP.  Insurance also covers a new mask, hose and filter about every 6 months.    Use a saline nasal spray to ease mild nasal congestion. Neti-Pot or saline nasal rinses may also help. Nasal gel sprays can help reduce nasal dryness.  Biotene mouthwash can be helpful to protect your teeth if you experience frequent dry mouth.  Dry mouth may be a sign of air escaping out of your mouth or out of the mask in the case of a full face mask.  Speak with your provider if you expect that is the case.     Take a nasal decongestant to relieve more severe nasal or sinus congestion.  Do not use Afrin (oxymetazoline) nasal spray more than 3 days in a row.  Speak with your sleep doctor if your nasal congestion is chronic.    Use a heated humidifier that fits your CPAP model to enhance your breathing comfort. Adjust the heat setting up if you get a dry nose or throat, down if you get condensation in the hose or mask.  Position the CPAP lower than you so that any condensation in the hose drains back into the machine rather than towards the mask.    Try a system that uses nasal pillows if traditional masks give you problems.    Clean your mask, tubing and headgear once a week. Make sure the equipment dries fully.    Regularly check and replace the filters for  your CPAP unit and humidifier.    Work closely with your sleep provider and your CPAP supplier to make sure that you have the machine, mask and air pressure setting that works best for you. It is better to stop using it and call your provider to solve problems than to lay awake all night frustrated with the device.    BESIDES CPAP, WHAT OTHER THERAPIES ARE THERE?      Positioning Device  Positioning devices are generally used when sleep apnea is mild and only occurs on your back.This example shows a pillow that straps around the waist. It may be appropriate for those whose sleep study shows milder sleep apnea that occurs primarily when lying flat on one's back. Preliminary studies have shown benefit but effectiveness at home may need to be verified by a home sleep test. These devices are generally not covered by medical insurance.                      Oral Appliance  What is oral appliance therapy?  An oral appliance is a small acrylic device that fits over the upper and lower teeth or tongue (similar to an orthodontic retainer or a mouth guard). This device slightly advances the lower jaw or tongue, which moves the base of the tongue forward, opens the airway, improves breathing and can effectively treat snoring and obstructive sleep apnea sleep apnea. The appliance is fabricated and customized by a qualified dentist with experience in treating snoring and sleep apnea. Oral appliances are usually well tolerated and have relatively high compliance by patients1, 2, 3.  When is an oral appliance indicated?  Oral appliance therapy is recommended as a first-line treatment for patients with primary snoring, mild sleep apnea, and for patients with moderate sleep apnea who prefer appliance therapy to use of CPAP4, 5. Severity of sleep apnea is determined by sleep testing and is based on the number of respiratory events per hour of sleep.   How successful is oral appliance therapy?  The success rate of oral appliance  therapy in patients with mild sleep apnea is 75-80% while in patients with moderate sleep apnea it is 50-70%. The chance of success in patients with severe sleep apnea is 40-50%. The research also shows that oral appliances have a beneficial effect on the cardiovascular health of CITLALLI patients at the same magnitude as CPAP therapy7.  Oral appliances should be a second-line treatment in cases of severe sleep apnea, but if not completely successful then a combination therapy utilizing CPAP plus oral appliance therapy may be effective. Oral appliances tend to be effective in a broad range of patients although studies show that the patients who have the highest success are females, younger patients, those with milder disease, and less severe obesity. 3, 6.   The chances of success are lower in patients who have more severe CITLALLI, are older, and those who are morbidly obese.     Example of an oral appliance   Finding a dentist that practices dental sleep medicine  Specific training is available through the American Academy of Dental Sleep Medicine for dentists interested in working in the field of sleep. To find a dentist who is educated in the field of sleep and the use of oral appliances, near you, visit the Web site of the American Academy of Dental Sleep Medicine; also see   http://www.accpstorage.org/newOrganization/patients/oralAppliances.pdf  To search for a dentist certified in these practices:  Http://aadsm.org/FindADentist.aspx?1  1. Britt et al. Objectively measured vs self-reported compliance during oral appliance therapy for sleep-disordered breathing. Chest 2013; 144(5): 9408-7834.  2. Roxanna et al. Objective measurement of compliance during oral appliance therapy for sleep-disordered breathing. Thorax 2013; 68(1): 91-96.  3. Chantale et al. Mandibular advancement devices in 620 men and women with CITLALLI and snoring: tolerability and predictors of treatment success. Chest 2004; 125: 2474-0645.  4.  Milan et al. Oral appliances for snoring and CITALLLI: a review. Sleep 2006; 29: 244-262.  5. Julia et al. Oral appliance treatment for CITLALLI: an update. J Clin Sleep Med 2014; 10(2): 215-227.  6. katia Irving al. Predictors of OSAH treatment outcome. J Dent Res 2007; 86: 7661-1243.      Weight Loss:    Weight management is a personal decision.  If you are interested in exploring weight loss strategies, the following discussion covers the impact on weight loss on sleep apnea and the approaches that may be successful.    Weight loss decreases severity of sleep apnea in most people with obesity. For those with mild obesity who have developed snoring with weight gain, even 15-30 pound weight loss can improve and occasionally eliminate sleep apnea.  Structured and life-long dietary and health habits are necessary to lose weight and keep healthier weight levels.     Though there may be significant health benefits from weight loss, long-term weight loss is very difficult to achieve- studies show success with dietary management in less than 10% of people. In addition, substantial weight loss may require years of dietary control and may be difficult if patients have severe obesity. In these cases, surgical management may be considered.  Finally, older individuals who have tolerated obesity without health complications may be less likely to benefit from weight loss strategies.    Your BMI is Body mass index is 50.19 kg/(m^2).  Body mass index (BMI) is one way to tell whether you are at a healthy weight, overweight, or obese. It measures your weight in relation to your height.  A BMI of 18.5 to 24.9 is in the healthy range. A person with a BMI of 25 to 29.9 is considered overweight, and someone with a BMI of 30 or greater is considered obese. More than two-thirds of American adults are considered overweight or obese.  Being overweight or obese increases the risk for further weight gain. Excess weight may lead to heart  disease and diabetes.  Creating and following plans for healthy eating and physical activity may help you improve your health.  Weight control is part of healthy lifestyle and includes exercise, emotional health, and healthy eating habits. Careful eating habits lifelong are the mainstay of weight control. Though there are significant health benefits from weight loss, long-term weight loss with diet alone may be very difficult to achieve- studies show long-term success with dietary management in less than 10% of people. Attaining a healthy weight may be especially difficult to achieve in those with severe obesity. In some cases, medications, devices and surgical management might be considered.  What can you do?  If you are overweight or obese and are interested in methods for weight loss, you should discuss this with your provider.     Consider reducing daily calorie intake by 500 calories.     Keep a food journal.     Avoiding skipping meals, consider cutting portions instead.    Diet combined with exercise helps maintain muscle while optimizing fat loss. Strength training is particularly important for building and maintaining muscle mass. Exercise helps reduce stress, increase energy, and improves fitness. Increasing exercise without diet control, however, may not burn enough calories to loose weight.       Start walking three days a week 10-20 minutes at a time    Work towards walking thirty minutes five days a week     Eventually, increase the speed of your walking for 1-2 minutes at time    In addition, we recommend that you review healthy lifestyles and methods for weight loss available through the National Institutes of Health patient information sites:  http://win.niddk.nih.gov/publications/index.htm    And look into health and wellness programs that may be available through your health insurance provider, employer, local community center, or eduarda club.    Weight management plan: Patient was referred to  their PCP to discuss a diet and exercise plan.    Surgery:    Upper Airway Surgery for CITLALLI  Surgery for CITLALLI is a second-line treatment option in the management of sleep apnea.  Surgery should be considered for patients who are having a difficult time tolerating CPAP.    Surgery for CITLALLI is directed at areas that are responsible for narrowing or complete obstruction of the airway during sleep.  There are a wide range of procedures available to enlarge and/or stabilize the airway to prevent blockage of breathing in the three major areas where it can occur: the palate, tongue, and nasal regions.  Successful surgical treatment depends on the accurate identification of the factors responsible for obstructive sleep apnea in each person.  A personalized approach is required because there is no single treatment that works well for everyone.  Because of anatomic variation, consultation with an examination by a sleep surgeon is a critical first step in determining what surgical options are best for each patient.  In some cases, examination during sedation may be recommended in order to guide the selection of procedures.  Patients will be counseled about risks and benefits as well as the typical recovery course after surgery. Surgery is typically not a cure for a person s CITLALLI.  However, surgery will often significantly improve one s CITLALLI severity (termed  success rate ).  Even in the absence of a cure, surgery will decrease the cardiovascular risk associated with OSA7; improve overall quality of life8 (sleepiness, functionality, sleep quality, etc).          Palate Procedures:  Patients with CITLALLI often have narrowing of their airway in the region of their tonsils and uvula.  The goals of palate procedures are to widen the airway in this region as well as to help the tissues resist collapse.  Modern palate procedure techniques focus on tissue conservation and soft tissue rearrangement, rather than tissue removal.  Often the uvula  is preserved in this procedure. Residual sleep apnea is common in patient after pharyngoplasty with an average reduction in sleep apnea events of 33%2.      Tongue Procedures:  While patients are awake, the muscles that surround the throat are active and keep this region open for breathing. These muscles relax during sleep, allowing the tongue and other structures to collapse and block breathing.  There are several different tongue procedures available.  Selection of a tongue base procedure depends on characteristics seen on physical exam.  Generally, procedures are aimed at removing bulky tissues in this area or preventing the back of the tongue from falling back during sleep.  Success rates for tongue surgery range from 50-62%3.    Hypoglossal Nerve Stimulation:  Hypoglossal nerve stimulation has recently received approval from the [place] Food and Drug Administration for the treatment of obstructive sleep apnea.  This is based on research showing that the system was safe and effective in treating sleep apnea6.  Results showed that the median AHI score decreased 68%, from 29.3 to 9.0. This therapy uses an implant system that senses breathing patterns and delivers mild stimulation to airway muscles, which keeps the airway open during sleep.  The system consists of three fully implanted components: a small generator (similar in size to a pacemaker), a breathing sensor, and a stimulation lead.  Using a small handheld remote, a patient turns the therapy on before bed and off upon awakening.    Candidates for this device must be greater than 22 years of age, have moderate to severe CITLALLI (AHI between 20-65), BMI less than 32, have tried CPAP/oral appliance without success, and have appropriate upper airway anatomy (determined by a sleep endoscopy performed by Dr. Grullon).    Hypoglossal Nerve Stimulation Pathway:    The sleep surgeon s office will work with the patient through the insurance prior-authorization  process (including communications and appeals).    Nasal Procedures:  Nasal obstruction can interfere with nasal breathing during the day and night.  Studies have shown that relief of nasal obstruction can improve the ability of some patients to tolerate positive airway pressure therapy for obstructive sleep apnea1.  Treatment options include medications such as nasal saline, topical corticosteroid and antihistamine sprays, and oral medications such as antihistamines or decongestants. Non-surgical treatments can include external nasal dilators for selected patients. If these are not successful by themselves, surgery can improve the nasal airway either alone or in combination with these other options.      Combination Procedures:  Combination of surgical procedures and other treatments may be recommended, particularly if patients have more than one area of narrowing or persistent positional disease.  The success rate of combination surgery ranges from 66-80%2,3.      1. Pancho TARIQ. The Role of the Nose in Snoring and Obstructive Sleep Apnoea: An Update.  Eur Arch Otorhinolaryngol. 2011; 268: 1365-73.  2.  Colby SM; Julee JA; Mitzy JR; Pallanch JF; Freida MB; Lizet SG; Igor RDZ. Surgical modifications of the upper airway for obstructive sleep apnea in adults: a systematic review and meta-analysis. SLEEP 2010;33(10):8162-8590. Geovanna SMITH. Hypopharyngeal surgery in obstructive sleep apnea: an evidence-based medicine review.  Arch Otolaryngol Head Neck Surg. 2006 Feb;132(2):206-13.  3. Huy YH1, Berto Y, Franklyn PEG. The efficacy of anatomically based multilevel surgery for obstructive sleep apnea. Otolaryngol Head Neck Surg. 2003 Oct;129(4):327-35.  4. Kezirian E, Goldberg A. Hypopharyngeal Surgery in Obstructive Sleep Apnea: An Evidence-Based Medicine Review. Arch Otolaryngol Head Neck Surg. 2006 Feb;132(2):206-13.  5. Jason SWEENEY et al. Upper-Airway Stimulation for Obstructive Sleep Apnea.  N Engl J Med. 2014 [place]  "9;370(2):139-49.  6. Leona Y et al. Increased Incidence of Cardiovascular Disease in Middle-aged Men with Obstructive Sleep Apnea. Am J Respir Crit Care Med; 2002 166: 159-165  7. Jaron UMANZOR et al. Studying Life Effects and Effectiveness of Palatopharyngoplasty (SLEEP) study: Subjective Outcomes of Isolated Uvulopalatopharyngoplasty. Otolaryngol Head Neck Surg. 2011; 144: 623-631.                    Follow-ups after your visit        Your next 10 appointments already scheduled     Jan 30, 2017  2:30 PM   [facility]    [address] [place] [id number] [phone]              Future tests that were ordered for you today     Open Future Orders        Priority Expected Expires Ordered    Comprehensive Sleep Study Routine  7/29/2017 1/30/2017    ABG-Blood Gas Arterial ([facility] [place]) Routine  3/31/2017 1/30/2017            Who to contact     If you have questions or need follow up information about today's clinic visit or your schedule please contact [facility] directly at [phone].  Normal or non-critical lab and imaging results will be communicated to you by MyChart, letter or phone within 4 business days after the clinic has received the results. If you do not hear from us within 7 days, please contact the clinic through [employer] or phone. If you have a critical or abnormal lab result, we will notify you by phone as soon as possible.  Submit refill requests through [employer] or call your pharmacy and they will forward the refill request to us. Please allow 3 business days for your refill to be completed.          Additional Information About Your Visit        MyChart Information     [employer] lets you send messages to your doctor, view your test results, renew your prescriptions, schedule appointments and more. To sign up, go to [email or web address] . Click on \"Log in\" " "on the left side of the screen, which will take you to the Welcome page. Then click on \"Sign up Now\" on the right side of the page.     You will be asked to enter the access code listed below, as well as some personal information. Please follow the directions to create your username and password.     Your access code is: [email or web address]  Expires: 2017  9:02 AM     Your access code will  in 90 days. If you need help or a new code, please call your [facility] clinic or [phone].        Care EveryWhere ID     This is your Care EveryWhere ID. This could be used by other organizations to access your [facility] medical records  [id number]        Your Vitals Were     Pulse Height BMI (Body Mass Index) Pulse Oximetry          67 1.727 m (5' 8\") 50.19 kg/m2 96%         Blood Pressure from Last 3 Encounters:   17 126/73   17 132/88   16 132/80    Weight from Last 3 Encounters:   17 149.687 kg (330 lb)   17 149.914 kg (330 lb 8 oz)   16 146.512 kg (323 lb)              We Performed the Following     SLEEP EVALUATION & MANAGEMENT REFERRAL - ADULT        Primary Care Provider Office Phone # Fax #    Tawanda Aguayo [phone]       [facility] [address] [facility] [address]        Thank you!     Thank you for choosing [facility]  for your care. Our goal is always to provide you with excellent care. Hearing back from our patients is one way we can continue to improve our services. Please take a few minutes to complete the written survey that you may receive in the mail after your visit with us. Thank you!             Your Updated Medication List - Protect others around you: Learn how to safely use, store and throw away your medicines at www.disposemymeds.org.      Notice  As of 2017 11:53 AM    You have not been prescribed any medications.      "
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
## 2017-01-30 NOTE — NURSING NOTE
"    Chief Complaint   Patient presents with     Mass     right lower hip/groin       Initial Pulse 96  Ht 5' 8\" (1.727 m)  Wt 327 lb 1.6 oz (148.372 kg)  BMI 49.75 kg/m2  SpO2 97% Estimated body mass index is 49.75 kg/(m^2) as calculated from the following:    Height as of this encounter: 5' 8\" (1.727 m).    Weight as of this encounter: 327 lb 1.6 oz (148.372 kg)..  BP completed using cuff size: NA (Not Taken)      Jessenia Kenny CMA  ([facility])      "

## 2017-01-30 NOTE — MR AVS SNAPSHOT
After Visit Summary   1/30/2017    Misbah Floyd    MRN: [id number]           Patient Information     Date Of Birth          1970        Visit Information        Provider Department      1/30/2017 2:30 PM Solis Lopes MD [facility]         Follow-ups after your visit        Your next 10 appointments already scheduled     Feb 03, 2017  3:30 PM   ABG with NL RESPIRATORY THERAPY   [facility] ([facility])    [address] [place] [address]  [place] [address]   [phone]           If on oxygen, wear as prescribed by physician to the test.            Feb 07, 2017  8:00 PM   Psg Split W/Tcm with PH BED 1   [facility] ([facility])    [address] [place] [id number] [phone]            Feb 21, 2017  8:30 AM   Return Sleep Patient with Michael Goldsmith PA-C   [facility] ([facility])    [address] [place] [id number] [phone]              Future tests that were ordered for you today     Open Future Orders        Priority Expected Expires Ordered    Comprehensive Sleep Study Routine  7/29/2017 1/30/2017    ABG-Blood Gas Arterial ([facility]) Routine  3/31/2017 1/30/2017            Who to contact     If you have questions or need follow up information about today's clinic visit or your schedule please contact [facility] directly at [phone].  Normal or non-critical lab and imaging results will be communicated to you by MyChart, letter or phone within 4 business days after the clinic has received the results. If you do not hear from us within 7 days, please contact the clinic through [employer] or phone. If you have a critical or abnormal lab result, we will notify you by phone as soon as possible.  Submit refill requests through [employer] or call your pharmacy and they will forward  "the refill request to us. Please allow 3 business days for your refill to be completed.          Additional Information About Your Visit        MyChart Information     HomeTouch lets you send messages to your doctor, view your test results, renew your prescriptions, schedule appointments and more. To sign up, go to [email or web address] . Click on \"Log in\" on the left side of the screen, which will take you to the Welcome page. Then click on \"Sign up Now\" on the right side of the page.     You will be asked to enter the access code listed below, as well as some personal information. Please follow the directions to create your username and password.     Your access code is: [email or web address]  Expires: 2017  9:02 AM     Your access code will  in 90 days. If you need help or a new code, please call your [place] [facility] clinic or [phone].        Care EveryWhere ID     This is your Care EveryWhere ID. This could be used by other organizations to access your [facility] medical records  [id number]        Your Vitals Were     Pulse Height BMI (Body Mass Index) Pulse Oximetry          96 5' 8\" (1.727 m) 49.75 kg/m2 97%         Blood Pressure from Last 3 Encounters:   17 126/73   17 132/88   16 132/80    Weight from Last 3 Encounters:   17 327 lb 1.6 oz (148.372 kg)   17 330 lb (149.687 kg)   17 330 lb 8 oz (149.914 kg)              Today, you had the following     No orders found for display       Primary Care Provider Office Phone # Fax #    Tawanda Aguayo [phone]       [facility] [address] [facility] [address]        Thank you!     Thank you for choosing [facility]  for your care. Our goal is always to provide you with excellent care. Hearing back from our patients is one way we can continue to improve our services. Please take a few minutes to complete the written survey that you may receive in the mail after your " visit with us. Thank you!             Your Updated Medication List - Protect others around you: Learn how to safely use, store and throw away your medicines at www.disposemymeds.org.      Notice  As of 1/30/2017  2:43 PM    You have not been prescribed any medications.

## 2017-02-01 ENCOUNTER — TELEPHONE (OUTPATIENT)
Dept: SURGERY | Facility: OTHER | Age: 47
End: 2017-02-01

## 2017-02-01 NOTE — TELEPHONE ENCOUNTER
Surgery Scheduled    Date of Surgery 02/24/17 Time of Surgery 1:00pm  Procedure: Excision Right Hip Mass  Hospital/Surgical Facility: [place]  Surgeon: Dr Lopes  Type of Anesthesia Anticipated: LMA  Pre-Op: 02/17/17 with Dr Hollis   Post-Op: 03/06/17 with Dr Lopes  Pre-Certification -to be completed  Consent Signed -to be completed  Hospital Stay -same day procedure    Surgery Packet (and/or) Colonscopy Prep (was given/or mailed) to patient. Patient was also instructed to arrive 1 hour(s) prior to surgery.  Patient understood and agrees to the plan.      Tasha Prather  Specialty

## 2017-02-03 ENCOUNTER — HOSPITAL ENCOUNTER (OUTPATIENT)
Dept: RESPIRATORY THERAPY | Facility: CLINIC | Age: 47
Discharge: HOME | End: 2017-02-03
Attending: PHYSICIAN ASSISTANT | Admitting: PHYSICIAN ASSISTANT
Payer: COMMERCIAL

## 2017-02-03 DIAGNOSIS — G47.33 OSA (OBSTRUCTIVE SLEEP APNEA): ICD-10-CM

## 2017-02-03 LAB
BASE EXCESS BLDA CALC-SCNC: 1.6 MMOL/L
HCO3 BLD-SCNC: 26 MMOL/L (ref 21–28)
O2/TOTAL GAS SETTING VFR VENT: 21 %
PCO2 BLD: 42 MM HG (ref 35–45)
PH BLD: 7.41 PH (ref 7.35–7.45)
PO2 BLD: 78 MM HG (ref 80–105)

## 2017-02-03 PROCEDURE — 82803 BLOOD GASES ANY COMBINATION: CPT | Performed by: PHYSICIAN ASSISTANT

## 2017-02-03 PROCEDURE — 36600 WITHDRAWAL OF ARTERIAL BLOOD: CPT

## 2017-02-03 NOTE — LETTER
[facility]  [address] [place] [address]  [phone]             February 7, 2017    Misbah Floyd  [address] [facility] [address]              Dear Misbah,    The results of your recent lab tests were Normal. Enclosed is a copy of the values and results of your recent labs. It was a pleasure to see you at your last apptointment.    If you have questions or concerns, please call myself or my nurse at [phone].    Sincerely,      Michael Goldsmith PA/as CmA        Results for orders placed or performed during the hospital encounter of 02/03/17   ABG-Blood Gas Arterial ([facility] / [facility])   Result Value Ref Range    pH Arterial 7.41 7.35 - 7.45 pH    pCO2 Arterial 42 35 - 45 mm Hg    pO2 Arterial 78 (L) 80 - 105 mm Hg    Bicarbonate Arterial 26 21 - 28 mmol/L    Base Excess Art 1.6 mmol/L    FIO2 21

## 2017-02-07 ENCOUNTER — THERAPY VISIT (OUTPATIENT)
Dept: SLEEP MEDICINE | Facility: CLINIC | Age: 47
End: 2017-02-07
Payer: COMMERCIAL

## 2017-02-07 PROCEDURE — 95811 POLYSOM 6/>YRS CPAP 4/> PARM: CPT | Performed by: OTOLARYNGOLOGY

## 2017-02-07 NOTE — ADDENDUM NOTE
Encounter addended by: [facility] AprilTAISHA on: 2/7/2017 11:05 AM<BR>     Documentation filed: Letters, Message

## 2017-02-08 NOTE — PROGRESS NOTES
Sleep Tech Comments      STUDY TYPE: Split night, routine montage      SLEEP AID: none      PAP ACCLIMATION: Pt. trialed CPAP @ 4 cm for approximately 15 minutes. Masks trialed were a full nasal and nasal pillows. Pt. preferred a nasal mask overall.         RESPIRATORY EVENTS (BASELINE):     obstructive hypopneas      SNORING:   loud and continuous      TCO2 MONITORING and ABGs:   ABG-Blood Gas Arterial ([facility]) [LAB76] (Order [id number])    Exam Information       Exam Date Exam Time Accession # Results       2/3/17  3:27 PM [id number]     Component Results       Component Value Ref Range & Units Status      pH Arterial 7.41 7.35 - 7.45 pH Final      pCO2 Arterial 42 35 - 45 mm Hg Final      pO2 Arterial 78 (L) 80 - 105 mm Hg Final      Bicarbonate Arterial 26 21 - 28 mmol/L Final      Base Excess Art 1.6 mmol/L Final      Comment:       Reference range:  -9.0 to 1.8      FIO2 21  Final  Levels during testing:  Starting TCO2 @ 46 mmHg   Highest TCO2 @ 59 mmHg   Ending  TCO2 @ 51 mmHg      SUPPLEMENTAL O2:   none      HOB ELEVATION:   Flat with 1 pillow      TITRATION:   Started CPAP with heated humidity @ 4 cm H2O. Pt. was intolerant of the nasal mask, so a change to full face mask was made as pt. stated he felt he needed to exhale with his mouth. Still intolerant to CPAP, so change to bi-level with pt. commenting he at least felt now he could breathe. Starting with 8/4 cm and gradually increasing in 1 cm increments to a final setting of 16/11 cm H2O with REM supine sleep.      FINAL MASK TYPE/SIZE: ResMed FF Quattro (L)      PT IMPRESSION of PAP Tx: Pt. felt he got  a little more sleep, and not as tired.       SLEEP STAGES:   N1, N2, N3, REM      ECG:   NSR      MOVEMENTS:    Movements were associated with arousals. Pt. slept supine all night.      BEHAVIORS:   Pt. complied with all requests and behaved appropriately.

## 2017-02-17 ENCOUNTER — OFFICE VISIT (OUTPATIENT)
Dept: FAMILY MEDICINE | Facility: OTHER | Age: 47
End: 2017-02-17
Payer: COMMERCIAL

## 2017-02-17 VITALS
HEART RATE: 93 BPM | WEIGHT: 315 LBS | DIASTOLIC BLOOD PRESSURE: 68 MMHG | TEMPERATURE: 97.9 F | OXYGEN SATURATION: 96 % | SYSTOLIC BLOOD PRESSURE: 114 MMHG | BODY MASS INDEX: 48.96 KG/M2

## 2017-02-17 DIAGNOSIS — Z01.818 PREOP GENERAL PHYSICAL EXAM: Primary | ICD-10-CM

## 2017-02-17 DIAGNOSIS — Z23 NEED FOR TDAP VACCINATION: ICD-10-CM

## 2017-02-17 DIAGNOSIS — G47.33 OSA (OBSTRUCTIVE SLEEP APNEA): ICD-10-CM

## 2017-02-17 DIAGNOSIS — R22.41 MASS OF RIGHT HIP REGION: ICD-10-CM

## 2017-02-17 DIAGNOSIS — E66.01 MORBID OBESITY DUE TO EXCESS CALORIES (H): ICD-10-CM

## 2017-02-17 PROCEDURE — 99214 OFFICE O/P EST MOD 30 MIN: CPT | Mod: 25 | Performed by: INTERNAL MEDICINE

## 2017-02-17 PROCEDURE — 93000 ELECTROCARDIOGRAM COMPLETE: CPT | Performed by: INTERNAL MEDICINE

## 2017-02-17 PROCEDURE — 90715 TDAP VACCINE 7 YRS/> IM: CPT | Performed by: INTERNAL MEDICINE

## 2017-02-17 PROCEDURE — 90471 IMMUNIZATION ADMIN: CPT | Performed by: INTERNAL MEDICINE

## 2017-02-17 ASSESSMENT — PAIN SCALES - GENERAL: PAINLEVEL: NO PAIN (0)

## 2017-02-17 NOTE — PROGRESS NOTES
[facility]  [address] [facility] [address]  [phone]  Dept: [phone]    PRE-OP EVALUATION:  Today's date: 2017    Misbah Floyd (: 1970) presents for pre-operative evaluation assessment as requested by Dr. Lopes.  He requires evaluation and anesthesia risk assessment prior to undergoing surgery/procedure for treatment of hip mass .  Proposed procedure: EXCISE MASS HIP    Date of Surgery/ Procedure: 2017  Time of Surgery/ Procedure: 1:00  Hospital/Surgical Facility: [facility]  Primary Physician: Tawanda Aguayo  Type of Anesthesia Anticipated: General    Patient has a Health Care Directive or Living Will:  NO    1. NO - Do you have a history of heart attack, stroke, stent, bypass or surgery on an artery in the head, neck, heart or legs?  2. NO - Do you ever have any pain or discomfort in your chest?  3. NO - Do you have a history of  Heart Failure?  4. NO - Are you troubled by shortness of breath when: walking on the level, up a slight hill or at night?  5. NO - Do you currently have a cold, bronchitis or other respiratory infection?  6. NO - Do you have a cough, shortness of breath or wheezing?  7. NO - Do you sometimes get pains in the calves of your legs when you walk?  8. NO - Do you or anyone in your family have previous history of blood clots?  9. NO - Do you or does anyone in your family have a serious bleeding problem such as prolonged bleeding following surgeries or cuts?  10. NO - Have you ever had problems with anemia or been told to take iron pills?  11. NO - Have you had any abnormal blood loss such as black, tarry or bloody stools, or abnormal vaginal bleeding?  12. NO - Have you ever had a blood transfusion?  13. NO - Have you or any of your relatives ever had problems with anesthesia?  14. YES - DO YOU HAVE SLEEP APNEA, EXCESSIVE SNORING OR DAYTIME DROWSINESS? Possible sleep apnea  15. NO - Do you have any prosthetic heart  valves?  16. NO - Do you have prosthetic joints?  17. NO - Is there any chance that you may be pregnant?      HPI:                                                      Brief HPI related to upcoming procedure: The patient has a large soft tissue lesion on his lateral right hip. Concerns regarding the possibility of malignancy are entertained. He is now scheduled for surgical excision requiring general anesthesia. Risks and benefits of the procedure have been explained to him by the surgeon and are again reiterated today.      See problem list for active medical problems.  Problems all longstanding and stable, except as noted/documented.  See ROS for pertinent symptoms related to these conditions.                                                                                                  .    MEDICAL HISTORY:                                                      Patient Active Problem List    Diagnosis Date Noted     Morbid obesity due to excess calories (H) 01/20/2017     Priority: Medium     CITLALLI (obstructive sleep apnea) 01/20/2017     Priority: Medium     Abscess of anal and rectal regions 02/19/2007      No past medical history on file.  Past Surgical History   Procedure Laterality Date     Hc cystourethroscopy  04/21/2006     Stone basket extraction. Left double-J stent placemnt.     No current outpatient prescriptions on file.     OTC products: None, except as noted above    Allergies   Allergen Reactions     Bees Swelling      Latex Allergy: NO    Social History   Substance Use Topics     Smoking status: Never Smoker     Smokeless tobacco: Never Used     Alcohol use No     History   Drug Use No       REVIEW OF SYSTEMS:                                                    C: NEGATIVE for fever, chills, change in weight  I: NEGATIVE for worrisome rashes, moles or lesions  E: NEGATIVE for vision changes or irritation  E/M: NEGATIVE for ear, mouth and throat problems  R: NEGATIVE for significant cough or SOB  B:  NEGATIVE for masses, tenderness or discharge  CV: NEGATIVE for chest pain, palpitations or peripheral edema  GI: NEGATIVE for nausea, abdominal pain, heartburn, or change in bowel habits  : NEGATIVE for frequency, dysuria, or hematuria  M: NEGATIVE for significant arthralgias or myalgia  MUSCULOSKELETAL: Patient has a large soft tissue lesion on his right hip as described.  E: NEGATIVE for temperature intolerance, skin/hair changes  H: NEGATIVE for bleeding problems  P: NEGATIVE for changes in mood or affect    EXAM:                                                    /68 (BP Location: Left arm, Patient Position: Chair, Cuff Size: Adult Large)  Pulse 93  Temp 97.9  F (36.6  C) (Temporal)  Wt (!) 322 lb (146.1 kg)  SpO2 96%  BMI 48.96 kg/m2    GENERAL APPEARANCE: healthy, alert and no distress     EYES: EOMI, - PERRL     HENT: ear canals and TM's normal and nose and mouth without ulcers or lesions     NECK: no adenopathy, no asymmetry, masses, or scars and thyroid normal to palpation     RESP: lungs clear to auscultation - no rales, rhonchi or wheezes     CV: regular rates and rhythm, normal S1 S2, no S3 or S4 and no murmur, click or rub -     ABDOMEN:  soft, nontender, no HSM or masses and bowel sounds normal     MS: extremities normal- no gross deformities noted, no varicosities, normal muscle tone, trigger points the soft tissue mass on the right hip is noted. Measuring approximately 6 cm in diameter.    SKIN: no suspicious lesions or rashes     NEURO: Normal strength and tone, sensory exam grossly normal, mentation intact and speech normal     PSYCH: mentation appears normal. and affect normal/bright     LYMPHATICS: No axillary, cervical, inguinal, or supraclavicular nodes    DIAGNOSTICS:                                                    Recent laboratory work reviewed. It is unremarkable.        Component Value Flag Ref Range Units Status Collected Lab   WBC 8.1  4.0 - 11.0 10e9/L Final 01/20/2017  8:58  AM [facility] lab   RBC Count 5.21  4.4 - 5.9 10e12/L Final 01/20/2017  8:58 AM [facility] lab   Hemoglobin 15.5  13.3 - 17.7 g/dL Final 01/20/2017  8:58 AM [facility] lab   Hematocrit 45.9  40.0 - 53.0 % Final 01/20/2017  8:58 AM [facility] lab   MCV 88  78 - 100 fl Final 01/20/2017  8:58 AM [facility] lab   MCH 29.8  26.5 - 33.0 pg Final 01/20/2017  8:58 AM [facility] lab   MCHC 33.8  31.5 - 36.5 g/dL Final 01/20/2017  8:58 AM [facility] lab   RDW 12.5  10.0 - 15.0 % Final 01/20/2017  8:58 AM [facility] lab   Platelet Count 221  150 - 450 10e9/L Final 01/20/2017  8:58 AM [facility] lab     Component Value Flag Ref Range Units Status Collected Lab   Sodium 141  133 - 144 mmol/L Final 01/20/2017  8:58 AM [facility] Lab   Potassium 4.5  3.4 - 5.3 mmol/L Final 01/20/2017  8:58 AM [facility] Lab   Chloride 102  94 - 109 mmol/L Final 01/20/2017  8:58 AM [facility] Lab   Carbon Dioxide 34 (H) 20 - 32 mmol/L Final 01/20/2017  8:58 AM [facility] Lab   Anion Gap 5  3 - 14 mmol/L Final 01/20/2017  8:58 AM [facility] Lab   Glucose 92  70 - 99 mg/dL Final 01/20/2017  8:58 AM [facility] Lab   Urea Nitrogen 18  7 - 30 mg/dL Final 01/20/2017  8:58 AM [facility] Lab   Creatinine 0.92  0.66 - 1.25 mg/dL Final 01/20/2017  8:58 AM [facility] Lab   GFR Estimate 89  >60 mL/min/1.7m2 Final 01/20/2017  8:58 AM [facility] Lab   Comment:   Non  GFR Calc   GFR Estimate If Black   >60 mL/min/1.7m2 Final 01/20/2017  8:58 AM [facility]   >90    GFR Calc      Calcium 9.1  8.5 - 10.1 mg/dL Final 01/20/2017  8:58 AM [facility]   Bilirubin Total 0.5  0.2 - 1.3 mg/dL Final 01/20/2017  8:58 AM [facility]   Albumin 3.8  3.4 - 5.0 g/dL Final 01/20/2017  8:58 AM [facility]   Protein Total 7.3  6.8 - 8.8 g/dL Final 01/20/2017  8:58 AM [facility]   Alkaline Phosphatase 73  40 - 150 U/L Final 01/20/2017  8:58 AM [facility] Lab   ALT 44  0 - 70 U/L Final 01/20/2017  8:58 AM [facility]   AST 20  0 - 45 U/L Final 01/20/2017  8:58 AM [facility]     Component Value Flag Ref Range Units Status Collected Lab   Color Urine Yellow    Final 01/20/2017  9:08  AM [facility] lab   Appearance Urine Clear    Final 01/20/2017  9:08 AM [facility] lab   Glucose Urine Negative  NEG mg/dL Final 01/20/2017  9:08 AM [facility] lab   Bilirubin Urine Negative  NEG  Final 01/20/2017  9:08 AM [facility] lab   Ketones Urine Negative  NEG mg/dL Final 01/20/2017  9:08 AM [facility] lab   Specific [place] Urine 1.020  1.003 - 1.035  Final 01/20/2017  9:08 AM fn [facility] lab   Blood Urine Negative  NEG  Final 01/20/2017  9:08 AM fn [facility] lab   pH Urine 7.5 (H) 5.0 - 7.0 pH Final 01/20/2017  9:08 AM fn [facility] lab   Protein Albumin Urine Negative  NEG mg/dL Final 01/20/2017  9:08 AM fn [facility] lab   Urobilinogen Urine 0.2  0.2 - 1.0 EU/dL Final 01/20/2017  9:08 AM fn [facility] lab   Nitrite Urine Negative  NEG  Final 01/20/2017  9:08 AM fn [facility] lab   Leukocyte Esterase Urine Negative  NEG  Final 01/20/2017  9:08 AM [facility] lab   Source Midstream Urine    Final 01/20/2017  9:08 AM fn [facility] lab         EKG review demonstrates a normal sinus rhythm without evidence of ischemia or arrhythmia.    IMPRESSION:                                                    Reason for surgery/procedure: Progressively enlarging tumor on the right hip requiring surgical excision  Diagnosis/reason for consult: Perioperative risk assessment in a 46-year-old gentleman with:   1. Mass of right hip region    - EKG 12-lead complete w/read - Clinics    2. Preop general physical exam      3. CITLALLI (obstructive sleep apnea)      4. Morbid obesity due to excess calories (H)      5. Need for Tdap vaccination    - TDAP (ADACEL AGES 11-64)  - ADMIN 1st VACCINE    The proposed surgical procedure is considered INTERMEDIATE risk.    REVISED CARDIAC RISK INDEX  The patient has the following serious cardiovascular risks for perioperative complications such as (MI, PE, VFib and 3  AV Block):  No serious cardiac risks  INTERPRETATION: 1 risks: Class II (low risk - 0.9% complication rate)    The patient has the following additional risks for perioperative complications:  No  identified additional risks    No diagnosis found.    RECOMMENDATIONS:                                                          {IMPORTANT - Medications  Patient takes no medications      APPROVAL GIVEN to proceed with proposed procedure, without further diagnostic evaluation       Signed Electronically by: Leandro Hollis DO    Copy of this evaluation report is provided to requesting physician.    [facility] Preop Guidelines

## 2017-02-17 NOTE — MR AVS SNAPSHOT
After Visit Summary   2/17/2017    Misbah Floyd    MRN: [id number]           Patient Information     Date Of Birth          1970        Visit Information        Provider Department      2/17/2017 2:20 PM Leandro Hollis DO [facility]        Today's Diagnoses     Preop general physical exam    -  1    Mass of right hip region        CITLALLI (obstructive sleep apnea)        Morbid obesity due to excess calories (H)        Need for Tdap vaccination          Care Instructions      Before Your Surgery      Call your surgeon if there is any change in your health. This includes signs of a cold or flu (such as a sore throat, runny nose, cough, rash or fever).    Do not smoke, drink alcohol or take over the counter medicine (unless your surgeon or primary care doctor tells you to) for the 24 hours before and after surgery.    If you take prescribed drugs: Follow your doctor s orders about which medicines to take and which to stop until after surgery.    Eating and drinking prior to surgery: follow the instructions from your surgeon    Take a shower or bath the night before surgery. Use the soap your surgeon gave you to gently clean your skin. If you do not have soap from your surgeon, use your regular soap. Do not shave or scrub the surgery site.  Wear clean pajamas and have clean sheets on your bed.         Follow-ups after your visit        Your next 10 appointments already scheduled     Feb 21, 2017  8:30 AM CST   Return Sleep Patient with Michael Goldsmith PA-C   [place] [facility] ([facility])    [address] [place] [address]   [phone]            Feb 24, 2017   Procedure with Solis Lopes MD   [facility] ([facility])    [address] [place] [address]   [phone]           From [address]: Exit at [address] on south side of [place]. Turn right on [address].  "Turn left at stoplight on [address]. [facility] will be in view two blocks ahead            Mar 06, 2017  2:30 PM CST   Return Visit with Solis Lopes MD   [facility] ([facility])    [address] [address] [phone]              Who to contact     If you have questions or need follow up information about today's clinic visit or your schedule please contact [facility] directly at [phone].  Normal or non-critical lab and imaging results will be communicated to you by Pinocciohart, letter or phone within 4 business days after the clinic has received the results. If you do not hear from us within 7 days, please contact the clinic through eEyet or phone. If you have a critical or abnormal lab result, we will notify you by phone as soon as possible.  Submit refill requests through Niupai or call your pharmacy and they will forward the refill request to us. Please allow 3 business days for your refill to be completed.          Additional Information About Your Visit        PinoccioharCelePost Information     Niupai lets you send messages to your doctor, view your test results, renew your prescriptions, schedule appointments and more. To sign up, go to [email or web address] . Click on \"Log in\" on the left side of the screen, which will take you to the Welcome page. Then click on \"Sign up Now\" on the right side of the page.     You will be asked to enter the access code listed below, as well as some personal information. Please follow the directions to create your username and password.     Your access code is: [email or web address]  Expires: 2017  9:02 AM     Your access code will  in 90 days. If you need help or a new code, please call your [facility] or [phone].        Care EveryWhere ID     This is your Care EveryWhere ID. This could be used by other organizations to access your [place] medical records  [id number]      "   Your Vitals Were     Pulse Temperature Pulse Oximetry BMI (Body Mass Index)          93 97.9  F (36.6  C) (Temporal) 96% 48.96 kg/m2         Blood Pressure from Last 3 Encounters:   02/17/17 114/68   01/30/17 126/73   01/20/17 132/88    Weight from Last 3 Encounters:   02/17/17 (!) 322 lb (146.1 kg)   01/30/17 (!) 327 lb 1.6 oz (148.4 kg)   01/30/17 (!) 330 lb (149.7 kg)              We Performed the Following     ADMIN 1st VACCINE     EKG 12-lead complete w/read - Clinics     TDAP (ADACEL AGES 11-64)        Primary Care Provider Office Phone # Fax #    Tawanda Aguayo [phone]       [facility] [address] [facility] [address]        Thank you!     Thank you for choosing [facility]  for your care. Our goal is always to provide you with excellent care. Hearing back from our patients is one way we can continue to improve our services. Please take a few minutes to complete the written survey that you may receive in the mail after your visit with us. Thank you!             Your Updated Medication List - Protect others around you: Learn how to safely use, store and throw away your medicines at www.disposemymeds.org.      Notice  As of 2/17/2017 11:59 PM    You have not been prescribed any medications.

## 2017-02-17 NOTE — NURSING NOTE
"Chief Complaint   Patient presents with     Pre-Op Exam       Initial /68 (BP Location: Left arm, Patient Position: Chair, Cuff Size: Adult Large)  Pulse 93  Temp 97.9  F (36.6  C) (Temporal)  Wt (!) 322 lb (146.1 kg)  SpO2 96%  BMI 48.96 kg/m2 Estimated body mass index is 48.96 kg/(m^2) as calculated from the following:    Height as of 1/30/17: 5' 8\" (1.727 m).    Weight as of this encounter: 322 lb (146.1 kg).  Medication Reconciliation: complete   Deepa RODARTE      "

## 2017-02-21 ENCOUNTER — OFFICE VISIT (OUTPATIENT)
Dept: SLEEP MEDICINE | Facility: CLINIC | Age: 47
End: 2017-02-21
Payer: COMMERCIAL

## 2017-02-21 VITALS
DIASTOLIC BLOOD PRESSURE: 66 MMHG | OXYGEN SATURATION: 96 % | BODY MASS INDEX: 47.74 KG/M2 | HEART RATE: 69 BPM | WEIGHT: 315 LBS | HEIGHT: 68 IN | SYSTOLIC BLOOD PRESSURE: 130 MMHG

## 2017-02-21 DIAGNOSIS — G47.33 OSA (OBSTRUCTIVE SLEEP APNEA): Primary | ICD-10-CM

## 2017-02-21 PROCEDURE — 99213 OFFICE O/P EST LOW 20 MIN: CPT | Performed by: PHYSICIAN ASSISTANT

## 2017-02-21 NOTE — PATIENT INSTRUCTIONS

## 2017-02-21 NOTE — PROGRESS NOTES
Sleep Study Follow-Up Visit:    Date on this visit: 2/21/2017    Misbah Floyd comes in today for follow-up of his sleep study done on 2/7/2017 at [facility] for excessive daytime sleepiness and possible sleep apnea.    Sleep latency 5.5 minutes without Ambien.  REM achieved.   REM latency 50.5 minutes.  Sleep efficiency 86.8%. Total sleep time 118 minutes.    Sleep architecture:  Stage 1, 6.4% (5%), stage 2, 81.4% (45-55%), stage 3, 7.2% (15-20%), stage REM, 5.1% (20-25%).  AHI was 96.1, with desaturations. .2.  REM .0, consistent with severe REM CITLALLI.  Supine .2, consistent with severe SUPINE CITLALLI.  Periodic Limb Movement Index 0/hour.       CPAP titration:  Sleep latency 31 minutes.  REM latency 14 minutes.  Sleep efficiency 82.3%. Total sleep time 351.5 minutes. Sleep architecture:  Stage 1, 5.8% (5%), stage 2, 54.8% (45-55%), stage 3, 0% (15-20%), stage REM, 39.4% (20-25%).  BIPAP was titrated to 16/11 cm with the elimination of apneas ( AHI 2.5), hypopneas and desaturations. Supine REM was noted at this pressure.  Periodic Limb Movement Index 0/hour.     These findings were reviewed with patient.     Past medical/surgical history, family history, social history, medications and allergies were reviewed.      Problem List:  Patient Active Problem List    Diagnosis Date Noted     Morbid obesity due to excess calories (H) 01/20/2017     Priority: Medium     CITLALLI (obstructive sleep apnea) 01/20/2017     Priority: Medium     Abscess of anal and rectal regions 02/19/2007        Impression/Plan:  Severe sleep apnea, obesity hypoventilation syndrome. Will set up BIPAP 16/11, oximetry after on BIPAP.   He will follow up with me in about 4 week(s).     Twenty-five minutes spent with patient, all of which were spent face-to-face counseling, consulting, coordinating plan of care.      CC: Tawanda Aguayo

## 2017-02-21 NOTE — MR AVS SNAPSHOT
After Visit Summary   2/21/2017    Misbah Floyd    MRN: [id number]           Patient Information     Date Of Birth          1970        Visit Information        Provider Department      2/21/2017 8:30 AM Michael Goldsmith PA-C [place] [facility]        Today's Diagnoses     CITLALLI (obstructive sleep apnea)    -  1      Care Instructions      Your BMI is There is no height or weight on file to calculate BMI.  Weight management is a personal decision.  If you are interested in exploring weight loss strategies, the following discussion covers the approaches that may be successful. Body mass index (BMI) is one way to tell whether you are at a healthy weight, overweight, or obese. It measures your weight in relation to your height.  A BMI of 18.5 to 24.9 is in the healthy range. A person with a BMI of 25 to 29.9 is considered overweight, and someone with a BMI of 30 or greater is considered obese. More than two-thirds of American adults are considered overweight or obese.  Being overweight or obese increases the risk for further weight gain. Excess weight may lead to heart disease and diabetes.  Creating and following plans for healthy eating and physical activity may help you improve your health.  Weight control is part of healthy lifestyle and includes exercise, emotional health, and healthy eating habits. Careful eating habits lifelong are the mainstay of weight control. Though there are significant health benefits from weight loss, long-term weight loss with diet alone may be very difficult to achieve- studies show long-term success with dietary management in less than 10% of people. Attaining a healthy weight may be especially difficult to achieve in those with severe obesity. In some cases, medications, devices and surgical management might be considered.  What can you do?  If you are overweight or obese and are interested in methods for weight loss, you should discuss this  with your provider.     Consider reducing daily calorie intake by 500 calories.     Keep a food journal.     Avoiding skipping meals, consider cutting portions instead.    Diet combined with exercise helps maintain muscle while optimizing fat loss. Strength training is particularly important for building and maintaining muscle mass. Exercise helps reduce stress, increase energy, and improves fitness. Increasing exercise without diet control, however, may not burn enough calories to loose weight.       Start walking three days a week 10-20 minutes at a time    Work towards walking thirty minutes five days a week     Eventually, increase the speed of your walking for 1-2 minutes at time    In addition, we recommend that you review healthy lifestyles and methods for weight loss available through the National Institutes of Health patient information sites:  http://win.niddk.nih.gov/publications/index.htm    And look into health and wellness programs that may be available through your health insurance provider, employer, local community center, or eduarda club.    Weight management plan: Patient was referred to their PCP to discuss a diet and exercise plan.            Follow-ups after your visit        Follow-up notes from your care team     Return in about 4 weeks (around 3/21/2017).      Your next 10 appointments already scheduled     Feb 22, 2017  4:00 PM CST   Oximetry  with PH BED 3   [place] [facility] ([facility])    [address] [place] [id number] [phone]            Feb 23, 2017  4:00 PM CST   Oximetry Drop Off with PH BED 3   [facility] ([facility])    [address] [place] [address] [phone]            Feb 24, 2017   Procedure with Solis Lopes MD   [facility] ([facility])    [address] [place] [address] Dr Jamil BENDER [address]   [phone]           From [address]:  Exit at [address] on south side of [place]. Turn right on [address] Turn left at stoplight on [address]. [facility] will be in view two blocks ahead            Mar 02, 2017  2:30 PM CST   PAP SETUP with [place] SLEEP CENTER DME   [facility] ([facility])    [address] [facility] [facility] [address]   [phone]            Mar 06, 2017  2:30 PM CST   Return Visit with Solis Lopes MD   [facility]    [address] [place] [address]   [phone]            Apr 04, 2017  8:00 AM CDT   Return Sleep Patient with Michael Goldsmith PA-C   [facility] ([facility])    [address] [place] [address]   [phone]              Future tests that were ordered for you today     Open Future Orders        Priority Expected Expires Ordered    Overnight oximetry study Routine  8/20/2017 2/21/2017            Who to contact     If you have questions or need follow up information about today's clinic visit or your schedule please contact [facility] directly at [phone].  Normal or non-critical lab and imaging results will be communicated to you by MyChart, letter or phone within 4 business days after the clinic has received the results. If you do not hear from us within 7 days, please contact the clinic through Trendablhart or phone. If you have a critical or abnormal lab result, we will notify you by phone as soon as possible.  Submit refill requests through Razor Insights or call your pharmacy and they will forward the refill request to us. Please allow 3 business days for your refill to be completed.          Additional Information About Your Visit        Razor Insights Information     Razor Insights lets you send messages to your doctor, view your test results, renew your prescriptions, schedule appointments and more. To sign up, go to  [email or web address] . Click on \"Log in\" on the left side of the screen, which will take you to the Welcome page. Then click on \"Sign up Now\" on the right side of the page.     You will be asked to enter the access code listed below, as well as some personal information. Please follow the directions to create your username and password.     Your access code is: [email or web address]  Expires: 2017  9:02 AM     Your access code will  in 90 days. If you need help or a new code, please call your [place] clinic or [phone].        Care EveryWhere ID     This is your Care EveryWhere ID. This could be used by other organizations to access your [place] medical records  [id number]        Your Vitals Were     Pulse Height Pulse Oximetry BMI (Body Mass Index)          69 1.727 m (5' 8\") 96% 48.66 kg/m2         Blood Pressure from Last 3 Encounters:   17 130/66   17 114/68   17 126/73    Weight from Last 3 Encounters:   17 (!) 145.2 kg (320 lb)   17 (!) 146.1 kg (322 lb)   17 (!) 148.4 kg (327 lb 1.6 oz)              We Performed the Following     Comprehensive DME        Primary Care Provider Office Phone # Fax #    Tawanda Aguayo [phone]       [place] [address] [facility] [address]        Thank you!     Thank you for choosing [place] [facility]  for your care. Our goal is always to provide you with excellent care. Hearing back from our patients is one way we can continue to improve our services. Please take a few minutes to complete the written survey that you may receive in the mail after your visit with us. Thank you!             Your Updated Medication List - Protect others around you: Learn how to safely use, store and throw away your medicines at www.disposemymeds.org.      Notice  As of 2017 11:00 AM    You have not been prescribed any medications.      "

## 2017-02-21 NOTE — NURSING NOTE
"Chief Complaint   Patient presents with     Sleep Problem     go over psg results       Initial /66  Pulse 69  Ht 1.727 m (5' 8\")  Wt (!) 145.2 kg (320 lb)  SpO2 96%  BMI 48.66 kg/m2 Estimated body mass index is 48.66 kg/(m^2) as calculated from the following:    Height as of this encounter: 1.727 m (5' 8\").    Weight as of this encounter: 145.2 kg (320 lb).  Medication Reconciliation: complete    "

## 2017-02-22 ENCOUNTER — OFFICE VISIT (OUTPATIENT)
Dept: SLEEP MEDICINE | Facility: CLINIC | Age: 47
End: 2017-02-22
Payer: COMMERCIAL

## 2017-02-22 DIAGNOSIS — G47.33 OSA (OBSTRUCTIVE SLEEP APNEA): Primary | ICD-10-CM

## 2017-02-22 PROCEDURE — 99207 ZZC DROP WITH A PROCEDURE: CPT | Mod: 25

## 2017-02-22 NOTE — MR AVS SNAPSHOT
After Visit Summary   2/22/2017    Misbah Floyd    MRN: [id number]           Patient Information     Date Of Birth          1970        Visit Information        Provider Department      2/22/2017 4:00 PM PH BED 3 [facility]        Today's Diagnoses     CITLALLI (obstructive sleep apnea)    -  1       Follow-ups after your visit        Your next 10 appointments already scheduled     Feb 22, 2017  4:00 PM CST   Oximetry  with PH BED 3   [facility] ([facility])    [address] [place] [address]   [phone]            Feb 23, 2017  4:00 PM CST   Oximetry Drop Off with PH BED 3   [facility] ([facility])    [address] [place] [address]   [phone]            Feb 24, 2017   Procedure with Solis Lopes MD   [facility] ([facility])    [address] [place] [address]   [phone]           From [address]: Exit at [address] on south side of [place]. Turn right on [address]. Turn left at stoplight on [address]. [facility] will be in view two blocks ahead            Mar 02, 2017  2:30 PM CST   PAP SETUP with  SLEEP CENTER DME   [facility] ([facility])    [address] [place] [address]   [phone]            Mar 06, 2017  2:30 PM CST   Return Visit with Solis Lopes MD   [facility] ([facility])    [address] [place] [address]   [phone]            Apr 04, 2017  8:00 AM CDT   Return Sleep Patient with Michael Goldsmith PA-C   [facility] ([facility])    [address] [place] [address]   [phone]              Future tests that were ordered for you today     Open Future Orders        Priority Expected Expires Ordered     "Overnight oximetry study Routine  2017            Who to contact     If you have questions or need follow up information about today's clinic visit or your schedule please contact [facility] directly at [phone].  Normal or non-critical lab and imaging results will be communicated to you by trustedsafehart, letter or phone within 4 business days after the clinic has received the results. If you do not hear from us within 7 days, please contact the clinic through trustedsafehart or phone. If you have a critical or abnormal lab result, we will notify you by phone as soon as possible.  Submit refill requests through avandeo or call your pharmacy and they will forward the refill request to us. Please allow 3 business days for your refill to be completed.          Additional Information About Your Visit        trustedsafeharSynereca Pharmaceuticals Information     avandeo lets you send messages to your doctor, view your test results, renew your prescriptions, schedule appointments and more. To sign up, go to [email or web address] . Click on \"Log in\" on the left side of the screen, which will take you to the Welcome page. Then click on \"Sign up Now\" on the right side of the page.     You will be asked to enter the access code listed below, as well as some personal information. Please follow the directions to create your username and password.     Your access code is: [email or web address]  Expires: 2017  9:02 AM     Your access code will  in 90 days. If you need help or a new code, please call your [place] clinic or [phone].        Care EveryWhere ID     This is your Care EveryWhere ID. This could be used by other organizations to access your [place] medical records  [id number]         Blood Pressure from Last 3 Encounters:   17 130/66   17 114/68   17 126/73    Weight from Last 3 Encounters:   17 (!) 145.2 kg (320 lb)   17 (!) 146.1 kg (322 lb)   17 (!) 148.4 kg (327 lb 1.6 oz) "              Today, you had the following     No orders found for display       Primary Care Provider Office Phone # Fax #    Tawanda Aguayo [phone]       [facility] [address] [facility] [address]        Thank you!     Thank you for choosing [facility]  for your care. Our goal is always to provide you with excellent care. Hearing back from our patients is one way we can continue to improve our services. Please take a few minutes to complete the written survey that you may receive in the mail after your visit with us. Thank you!             Your Updated Medication List - Protect others around you: Learn how to safely use, store and throw away your medicines at www.disposemymeds.org.      Notice  As of 2/22/2017  3:21 PM    You have not been prescribed any medications.

## 2017-02-22 NOTE — H&P (VIEW-ONLY)
[facility]  [address] [facility] [address]  [phone]  Dept: [phone]    PRE-OP EVALUATION:  Today's date: 2017    Misbah Floyd (: 1970) presents for pre-operative evaluation assessment as requested by Dr. Lopes.  He requires evaluation and anesthesia risk assessment prior to undergoing surgery/procedure for treatment of hip mass .  Proposed procedure: EXCISE MASS HIP    Date of Surgery/ Procedure: 2017  Time of Surgery/ Procedure: 1:00  Hospital/Surgical Facility: [facility]  Primary Physician: Tawanda Aguayo  Type of Anesthesia Anticipated: General    Patient has a Health Care Directive or Living Will:  NO    1. NO - Do you have a history of heart attack, stroke, stent, bypass or surgery on an artery in the head, neck, heart or legs?  2. NO - Do you ever have any pain or discomfort in your chest?  3. NO - Do you have a history of  Heart Failure?  4. NO - Are you troubled by shortness of breath when: walking on the level, up a slight hill or at night?  5. NO - Do you currently have a cold, bronchitis or other respiratory infection?  6. NO - Do you have a cough, shortness of breath or wheezing?  7. NO - Do you sometimes get pains in the calves of your legs when you walk?  8. NO - Do you or anyone in your family have previous history of blood clots?  9. NO - Do you or does anyone in your family have a serious bleeding problem such as prolonged bleeding following surgeries or cuts?  10. NO - Have you ever had problems with anemia or been told to take iron pills?  11. NO - Have you had any abnormal blood loss such as black, tarry or bloody stools, or abnormal vaginal bleeding?  12. NO - Have you ever had a blood transfusion?  13. NO - Have you or any of your relatives ever had problems with anesthesia?  14. YES - DO YOU HAVE SLEEP APNEA, EXCESSIVE SNORING OR DAYTIME DROWSINESS? Possible sleep apnea  15. NO - Do you have any prosthetic heart  valves?  16. NO - Do you have prosthetic joints?  17. NO - Is there any chance that you may be pregnant?      HPI:                                                      Brief HPI related to upcoming procedure: The patient has a large soft tissue lesion on his lateral right hip. Concerns regarding the possibility of malignancy are entertained. He is now scheduled for surgical excision requiring general anesthesia. Risks and benefits of the procedure have been explained to him by the surgeon and are again reiterated today.      See problem list for active medical problems.  Problems all longstanding and stable, except as noted/documented.  See ROS for pertinent symptoms related to these conditions.                                                                                                  .    MEDICAL HISTORY:                                                      Patient Active Problem List    Diagnosis Date Noted     Morbid obesity due to excess calories (H) 01/20/2017     Priority: Medium     CITLALLI (obstructive sleep apnea) 01/20/2017     Priority: Medium     Abscess of anal and rectal regions 02/19/2007      No past medical history on file.  Past Surgical History   Procedure Laterality Date     Hc cystourethroscopy  04/21/2006     Stone basket extraction. Left double-J stent placemnt.     No current outpatient prescriptions on file.     OTC products: None, except as noted above    Allergies   Allergen Reactions     Bees Swelling      Latex Allergy: NO    Social History   Substance Use Topics     Smoking status: Never Smoker     Smokeless tobacco: Never Used     Alcohol use No     History   Drug Use No       REVIEW OF SYSTEMS:                                                    C: NEGATIVE for fever, chills, change in weight  I: NEGATIVE for worrisome rashes, moles or lesions  E: NEGATIVE for vision changes or irritation  E/M: NEGATIVE for ear, mouth and throat problems  R: NEGATIVE for significant cough or SOB  B:  NEGATIVE for masses, tenderness or discharge  CV: NEGATIVE for chest pain, palpitations or peripheral edema  GI: NEGATIVE for nausea, abdominal pain, heartburn, or change in bowel habits  : NEGATIVE for frequency, dysuria, or hematuria  M: NEGATIVE for significant arthralgias or myalgia  MUSCULOSKELETAL: Patient has a large soft tissue lesion on his right hip as described.  E: NEGATIVE for temperature intolerance, skin/hair changes  H: NEGATIVE for bleeding problems  P: NEGATIVE for changes in mood or affect    EXAM:                                                    /68 (BP Location: Left arm, Patient Position: Chair, Cuff Size: Adult Large)  Pulse 93  Temp 97.9  F (36.6  C) (Temporal)  Wt (!) 322 lb (146.1 kg)  SpO2 96%  BMI 48.96 kg/m2    GENERAL APPEARANCE: healthy, alert and no distress     EYES: EOMI, - PERRL     HENT: ear canals and TM's normal and nose and mouth without ulcers or lesions     NECK: no adenopathy, no asymmetry, masses, or scars and thyroid normal to palpation     RESP: lungs clear to auscultation - no rales, rhonchi or wheezes     CV: regular rates and rhythm, normal S1 S2, no S3 or S4 and no murmur, click or rub -     ABDOMEN:  soft, nontender, no HSM or masses and bowel sounds normal     MS: extremities normal- no gross deformities noted, no varicosities, normal muscle tone, trigger points the soft tissue mass on the right hip is noted. Measuring approximately 6 cm in diameter.    SKIN: no suspicious lesions or rashes     NEURO: Normal strength and tone, sensory exam grossly normal, mentation intact and speech normal     PSYCH: mentation appears normal. and affect normal/bright     LYMPHATICS: No axillary, cervical, inguinal, or supraclavicular nodes    DIAGNOSTICS:                                                    Recent laboratory work reviewed. It is unremarkable.        Component Value Flag Ref Range Units Status Collected Lab   WBC 8.1  4.0 - 11.0 10e9/L Final 01/20/2017  8:58  AM [facility] lab   RBC Count 5.21  4.4 - 5.9 10e12/L Final 01/20/2017  8:58 AM [facility] lab   Hemoglobin 15.5  13.3 - 17.7 g/dL Final 01/20/2017  8:58 AM fn [facility] lab   Hematocrit 45.9  40.0 - 53.0 % Final 01/20/2017  8:58 AM fn [facility] lab   MCV 88  78 - 100 fl Final 01/20/2017  8:58 AM fn [facility] lab   MCH 29.8  26.5 - 33.0 pg Final 01/20/2017  8:58 AM fn [facility] lab   MCHC 33.8  31.5 - 36.5 g/dL Final 01/20/2017  8:58 AM fn [facility] lab   RDW 12.5  10.0 - 15.0 % Final 01/20/2017  8:58 AM [facility] lab   Platelet Count 221  150 - 450 10e9/L Final 01/20/2017  8:58 AM [facility] lab     Component Value Flag Ref Range Units Status Collected Lab   Sodium 141  133 - 144 mmol/L Final 01/20/2017  8:58 AM [facility]   Potassium 4.5  3.4 - 5.3 mmol/L Final 01/20/2017  8:58 AM [facility]   Chloride 102  94 - 109 mmol/L Final 01/20/2017  8:58 AM [facility]   Carbon Dioxide 34 (H) 20 - 32 mmol/L Final 01/20/2017  8:58 AM [facility]   Anion Gap 5  3 - 14 mmol/L Final 01/20/2017  8:58 AM [facility]   Glucose 92  70 - 99 mg/dL Final 01/20/2017  8:58 AM [facility]   Urea Nitrogen 18  7 - 30 mg/dL Final 01/20/2017  8:58 AM [facility]   Creatinine 0.92  0.66 - 1.25 mg/dL Final 01/20/2017  8:58 AM [facility]   GFR Estimate 89  >60 mL/min/1.7m2 Final 01/20/2017  8:58 AM [facility]   Comment:   Non  GFR Calc   GFR Estimate If Black   >60 mL/min/1.7m2 Final 01/20/2017  8:58 AM [facility]   >90    GFR Calc      Calcium 9.1  8.5 - 10.1 mg/dL Final 01/20/2017  8:58 AM [facility]   Bilirubin Total 0.5  0.2 - 1.3 mg/dL Final 01/20/2017  8:58 AM [facility]   Albumin 3.8  3.4 - 5.0 g/dL Final 01/20/2017  8:58 AM [facility]   Protein Total 7.3  6.8 - 8.8 g/dL Final 01/20/2017  8:58 AM [facility]   Alkaline Phosphatase 73  40 - 150 U/L Final 01/20/2017  8:58 AM [facility]   ALT 44  0 - 70 U/L Final 01/20/2017  8:58 AM [facility]   AST 20  0 - 45 U/L Final 01/20/2017  8:58 AM [facility]     Component Value Flag Ref Range Units Status Collected Lab   Color Urine Yellow    Final 01/20/2017  9:08  AM fn [facility] lab   Appearance Urine Clear    Final 01/20/2017  9:08 AM [facility] lab   Glucose Urine Negative  NEG mg/dL Final 01/20/2017  9:08 AM [facility] lab   Bilirubin Urine Negative  NEG  Final 01/20/2017  9:08 AM [facility] lab   Ketones Urine Negative  NEG mg/dL Final 01/20/2017  9:08 AM [facility] lab   Specific [place] Urine 1.020  1.003 - 1.035  Final 01/20/2017  9:08 AM fn [facility] lab   Blood Urine Negative  NEG  Final 01/20/2017  9:08 AM fn [facility] lab   pH Urine 7.5 (H) 5.0 - 7.0 pH Final 01/20/2017  9:08 AM fn [facility] lab   Protein Albumin Urine Negative  NEG mg/dL Final 01/20/2017  9:08 AM fn [facility] lab   Urobilinogen Urine 0.2  0.2 - 1.0 EU/dL Final 01/20/2017  9:08 AM fn [facility] lab   Nitrite Urine Negative  NEG  Final 01/20/2017  9:08 AM fn [facility] lab   Leukocyte Esterase Urine Negative  NEG  Final 01/20/2017  9:08 AM [facility] lab   Source Midstream Urine    Final 01/20/2017  9:08 AM fn [facility] lab         EKG review demonstrates a normal sinus rhythm without evidence of ischemia or arrhythmia.    IMPRESSION:                                                    Reason for surgery/procedure: Progressively enlarging tumor on the right hip requiring surgical excision  Diagnosis/reason for consult: Perioperative risk assessment in a 46-year-old gentleman with:   1. Mass of right hip region    - EKG 12-lead complete w/read - Clinics    2. Preop general physical exam      3. CITLALLI (obstructive sleep apnea)      4. Morbid obesity due to excess calories (H)      5. Need for Tdap vaccination    - TDAP (ADACEL AGES 11-64)  - ADMIN 1st VACCINE    The proposed surgical procedure is considered INTERMEDIATE risk.    REVISED CARDIAC RISK INDEX  The patient has the following serious cardiovascular risks for perioperative complications such as (MI, PE, VFib and 3  AV Block):  No serious cardiac risks  INTERPRETATION: 1 risks: Class II (low risk - 0.9% complication rate)    The patient has the following additional risks for perioperative complications:  No  identified additional risks    No diagnosis found.    RECOMMENDATIONS:                                                          {IMPORTANT - Medications  Patient takes no medications      APPROVAL GIVEN to proceed with proposed procedure, without further diagnostic evaluation       Signed Electronically by: Leandro Hollis DO    Copy of this evaluation report is provided to requesting physician.    [facility] Preop Guidelines

## 2017-02-23 ENCOUNTER — ANESTHESIA EVENT (OUTPATIENT)
Dept: SURGERY | Facility: CLINIC | Age: 47
End: 2017-02-23
Payer: COMMERCIAL

## 2017-02-24 ENCOUNTER — ANESTHESIA (OUTPATIENT)
Dept: SURGERY | Facility: CLINIC | Age: 47
End: 2017-02-24
Payer: COMMERCIAL

## 2017-02-24 ENCOUNTER — HOSPITAL ENCOUNTER (OUTPATIENT)
Facility: CLINIC | Age: 47
Discharge: HOME | End: 2017-02-24
Attending: SPECIALIST | Admitting: SPECIALIST
Payer: COMMERCIAL

## 2017-02-24 VITALS
RESPIRATION RATE: 20 BRPM | OXYGEN SATURATION: 94 % | SYSTOLIC BLOOD PRESSURE: 131 MMHG | DIASTOLIC BLOOD PRESSURE: 81 MMHG | TEMPERATURE: 98.2 F | BODY MASS INDEX: 48.66 KG/M2 | WEIGHT: 315 LBS

## 2017-02-24 DIAGNOSIS — G89.18 POST-OP PAIN: Primary | ICD-10-CM

## 2017-02-24 PROCEDURE — 88304 TISSUE EXAM BY PATHOLOGIST: CPT | Mod: 26 | Performed by: SPECIALIST

## 2017-02-24 PROCEDURE — 25000128 H RX IP 250 OP 636: Performed by: SPECIALIST

## 2017-02-24 PROCEDURE — 27043 EXC HIP PELVIS LES SC 3 CM/>: CPT | Mod: RT | Performed by: SPECIALIST

## 2017-02-24 PROCEDURE — 27210794 ZZH OR GENERAL SUPPLY STERILE: Performed by: SPECIALIST

## 2017-02-24 PROCEDURE — 71000014 ZZH RECOVERY PHASE 1 LEVEL 2 FIRST HR: Performed by: SPECIALIST

## 2017-02-24 PROCEDURE — 25000125 ZZHC RX 250: Performed by: SPECIALIST

## 2017-02-24 PROCEDURE — 71000027 ZZH RECOVERY PHASE 2 EACH 15 MINS: Performed by: SPECIALIST

## 2017-02-24 PROCEDURE — 36000052 ZZH SURGERY LEVEL 2 EA 15 ADDTL MIN: Performed by: SPECIALIST

## 2017-02-24 PROCEDURE — 88304 TISSUE EXAM BY PATHOLOGIST: CPT | Performed by: SPECIALIST

## 2017-02-24 PROCEDURE — 25000566 ZZH SEVOFLURANE, EA 15 MIN: Performed by: SPECIALIST

## 2017-02-24 PROCEDURE — 25000125 ZZHC RX 250: Performed by: NURSE ANESTHETIST, CERTIFIED REGISTERED

## 2017-02-24 PROCEDURE — 25000132 ZZH RX MED GY IP 250 OP 250 PS 637: Performed by: SPECIALIST

## 2017-02-24 PROCEDURE — 37000008 ZZH ANESTHESIA TECHNICAL FEE, 1ST 30 MIN: Performed by: SPECIALIST

## 2017-02-24 PROCEDURE — 25800025 ZZH RX 258: Performed by: NURSE ANESTHETIST, CERTIFIED REGISTERED

## 2017-02-24 PROCEDURE — 37000009 ZZH ANESTHESIA TECHNICAL FEE, EACH ADDTL 15 MIN: Performed by: SPECIALIST

## 2017-02-24 PROCEDURE — 40000306 ZZH STATISTIC PRE PROC ASSESS II: Performed by: SPECIALIST

## 2017-02-24 PROCEDURE — 36000050 ZZH SURGERY LEVEL 2 1ST 30 MIN: Performed by: SPECIALIST

## 2017-02-24 PROCEDURE — 25000128 H RX IP 250 OP 636: Performed by: NURSE ANESTHETIST, CERTIFIED REGISTERED

## 2017-02-24 RX ORDER — LIDOCAINE 40 MG/G
CREAM TOPICAL
Status: DISCONTINUED | OUTPATIENT
Start: 2017-02-24 | End: 2017-02-24 | Stop reason: HOSPADM

## 2017-02-24 RX ORDER — LIDOCAINE HYDROCHLORIDE AND EPINEPHRINE 10; 10 MG/ML; UG/ML
INJECTION, SOLUTION INFILTRATION; PERINEURAL PRN
Status: DISCONTINUED | OUTPATIENT
Start: 2017-02-24 | End: 2017-02-24 | Stop reason: HOSPADM

## 2017-02-24 RX ORDER — HYDROCODONE BITARTRATE AND ACETAMINOPHEN 5; 325 MG/1; MG/1
1-2 TABLET ORAL EVERY 4 HOURS PRN
Qty: 30 TABLET | Refills: 0 | Status: SHIPPED | OUTPATIENT
Start: 2017-02-24 | End: 2017-05-02

## 2017-02-24 RX ORDER — FENTANYL CITRATE 50 UG/ML
25-50 INJECTION, SOLUTION INTRAMUSCULAR; INTRAVENOUS
Status: DISCONTINUED | OUTPATIENT
Start: 2017-02-24 | End: 2017-02-24 | Stop reason: HOSPADM

## 2017-02-24 RX ORDER — SODIUM CHLORIDE, SODIUM LACTATE, POTASSIUM CHLORIDE, CALCIUM CHLORIDE 600; 310; 30; 20 MG/100ML; MG/100ML; MG/100ML; MG/100ML
INJECTION, SOLUTION INTRAVENOUS CONTINUOUS
Status: DISCONTINUED | OUTPATIENT
Start: 2017-02-24 | End: 2017-02-24 | Stop reason: HOSPADM

## 2017-02-24 RX ORDER — LIDOCAINE HYDROCHLORIDE 20 MG/ML
INJECTION, SOLUTION INFILTRATION; PERINEURAL PRN
Status: DISCONTINUED | OUTPATIENT
Start: 2017-02-24 | End: 2017-02-24

## 2017-02-24 RX ORDER — DIMENHYDRINATE 50 MG/ML
INJECTION, SOLUTION INTRAMUSCULAR; INTRAVENOUS PRN
Status: DISCONTINUED | OUTPATIENT
Start: 2017-02-24 | End: 2017-02-24

## 2017-02-24 RX ORDER — HYDRALAZINE HYDROCHLORIDE 20 MG/ML
2.5-5 INJECTION INTRAMUSCULAR; INTRAVENOUS EVERY 10 MIN PRN
Status: DISCONTINUED | OUTPATIENT
Start: 2017-02-24 | End: 2017-02-24 | Stop reason: HOSPADM

## 2017-02-24 RX ORDER — HYDROMORPHONE HYDROCHLORIDE 1 MG/ML
.3-.5 INJECTION, SOLUTION INTRAMUSCULAR; INTRAVENOUS; SUBCUTANEOUS EVERY 10 MIN PRN
Status: DISCONTINUED | OUTPATIENT
Start: 2017-02-24 | End: 2017-02-24 | Stop reason: HOSPADM

## 2017-02-24 RX ORDER — HYDROCODONE BITARTRATE AND ACETAMINOPHEN 5; 325 MG/1; MG/1
1-2 TABLET ORAL
Status: COMPLETED | OUTPATIENT
Start: 2017-02-24 | End: 2017-02-24

## 2017-02-24 RX ORDER — PROPOFOL 10 MG/ML
INJECTION, EMULSION INTRAVENOUS PRN
Status: DISCONTINUED | OUTPATIENT
Start: 2017-02-24 | End: 2017-02-24

## 2017-02-24 RX ORDER — ONDANSETRON 4 MG/1
4 TABLET, ORALLY DISINTEGRATING ORAL EVERY 30 MIN PRN
Status: DISCONTINUED | OUTPATIENT
Start: 2017-02-24 | End: 2017-02-24 | Stop reason: HOSPADM

## 2017-02-24 RX ORDER — MEPERIDINE HYDROCHLORIDE 25 MG/ML
12.5 INJECTION INTRAMUSCULAR; INTRAVENOUS; SUBCUTANEOUS
Status: DISCONTINUED | OUTPATIENT
Start: 2017-02-24 | End: 2017-02-24 | Stop reason: HOSPADM

## 2017-02-24 RX ORDER — CEFAZOLIN SODIUM 1 G/50ML
3 SOLUTION INTRAVENOUS
Status: COMPLETED | OUTPATIENT
Start: 2017-02-24 | End: 2017-02-24

## 2017-02-24 RX ORDER — ONDANSETRON 2 MG/ML
4 INJECTION INTRAMUSCULAR; INTRAVENOUS EVERY 30 MIN PRN
Status: DISCONTINUED | OUTPATIENT
Start: 2017-02-24 | End: 2017-02-24 | Stop reason: HOSPADM

## 2017-02-24 RX ORDER — NALOXONE HYDROCHLORIDE 0.4 MG/ML
.1-.4 INJECTION, SOLUTION INTRAMUSCULAR; INTRAVENOUS; SUBCUTANEOUS
Status: DISCONTINUED | OUTPATIENT
Start: 2017-02-24 | End: 2017-02-24 | Stop reason: HOSPADM

## 2017-02-24 RX ORDER — FENTANYL CITRATE 50 UG/ML
25-50 INJECTION, SOLUTION INTRAMUSCULAR; INTRAVENOUS
Status: DISCONTINUED | OUTPATIENT
Start: 2017-02-24 | End: 2017-02-24

## 2017-02-24 RX ORDER — FENTANYL CITRATE 50 UG/ML
INJECTION, SOLUTION INTRAMUSCULAR; INTRAVENOUS PRN
Status: DISCONTINUED | OUTPATIENT
Start: 2017-02-24 | End: 2017-02-24

## 2017-02-24 RX ORDER — ONDANSETRON 2 MG/ML
INJECTION INTRAMUSCULAR; INTRAVENOUS PRN
Status: DISCONTINUED | OUTPATIENT
Start: 2017-02-24 | End: 2017-02-24

## 2017-02-24 RX ORDER — DIMENHYDRINATE 50 MG/ML
25 INJECTION, SOLUTION INTRAMUSCULAR; INTRAVENOUS
Status: DISCONTINUED | OUTPATIENT
Start: 2017-02-24 | End: 2017-02-24 | Stop reason: HOSPADM

## 2017-02-24 RX ORDER — ALBUTEROL SULFATE 0.83 MG/ML
2.5 SOLUTION RESPIRATORY (INHALATION) EVERY 4 HOURS PRN
Status: DISCONTINUED | OUTPATIENT
Start: 2017-02-24 | End: 2017-02-24 | Stop reason: HOSPADM

## 2017-02-24 RX ORDER — CEFAZOLIN SODIUM 1 G/3ML
1 INJECTION, POWDER, FOR SOLUTION INTRAMUSCULAR; INTRAVENOUS SEE ADMIN INSTRUCTIONS
Status: DISCONTINUED | OUTPATIENT
Start: 2017-02-24 | End: 2017-02-24 | Stop reason: HOSPADM

## 2017-02-24 RX ADMIN — ONDANSETRON 4 MG: 2 INJECTION INTRAMUSCULAR; INTRAVENOUS at 13:20

## 2017-02-24 RX ADMIN — Medication 3 G: at 13:03

## 2017-02-24 RX ADMIN — MIDAZOLAM HYDROCHLORIDE 2 MG: 1 INJECTION, SOLUTION INTRAMUSCULAR; INTRAVENOUS at 12:54

## 2017-02-24 RX ADMIN — SODIUM CHLORIDE, POTASSIUM CHLORIDE, SODIUM LACTATE AND CALCIUM CHLORIDE: 600; 310; 30; 20 INJECTION, SOLUTION INTRAVENOUS at 14:31

## 2017-02-24 RX ADMIN — FENTANYL CITRATE 50 MCG: 50 INJECTION, SOLUTION INTRAMUSCULAR; INTRAVENOUS at 13:10

## 2017-02-24 RX ADMIN — DIMENHYDRINATE 25 MG: 50 INJECTION, SOLUTION INTRAMUSCULAR; INTRAVENOUS at 13:04

## 2017-02-24 RX ADMIN — PROPOFOL 170 MG: 10 INJECTION, EMULSION INTRAVENOUS at 12:58

## 2017-02-24 RX ADMIN — LIDOCAINE HYDROCHLORIDE 40 MG: 20 INJECTION, SOLUTION INFILTRATION; PERINEURAL at 12:58

## 2017-02-24 RX ADMIN — SODIUM CHLORIDE, POTASSIUM CHLORIDE, SODIUM LACTATE AND CALCIUM CHLORIDE: 600; 310; 30; 20 INJECTION, SOLUTION INTRAVENOUS at 12:45

## 2017-02-24 RX ADMIN — HYDROCODONE BITARTRATE AND ACETAMINOPHEN 1 TABLET: 5; 325 TABLET ORAL at 15:08

## 2017-02-24 RX ADMIN — LIDOCAINE HYDROCHLORIDE 1 ML: 10 INJECTION, SOLUTION EPIDURAL; INFILTRATION; INTRACAUDAL; PERINEURAL at 12:45

## 2017-02-24 RX ADMIN — FENTANYL CITRATE 100 MCG: 50 INJECTION, SOLUTION INTRAMUSCULAR; INTRAVENOUS at 12:54

## 2017-02-24 ASSESSMENT — PAIN DESCRIPTION - DESCRIPTORS
DESCRIPTORS: SORE
DESCRIPTORS: SORE

## 2017-02-24 ASSESSMENT — LIFESTYLE VARIABLES: TOBACCO_USE: 0

## 2017-02-24 NOTE — ANESTHESIA POSTPROCEDURE EVALUATION
Patient: Misbah Floyd    Procedure(s):  excision right hip mass - Wound Class: I-Clean    Diagnosis:right hip mass  Diagnosis Additional Information: No value filed.    Anesthesia Type:  General    Note:  Anesthesia Post Evaluation    Patient location during evaluation: PACU  Patient participation: Able to fully participate in evaluation  Level of consciousness: awake  Pain management: adequate  Airway patency: patent  Cardiovascular status: acceptable and hemodynamically stable  Respiratory status: acceptable, room air and nonlabored ventilation  Hydration status: stable  PONV: none     Anesthetic complications: None    Comments: Patient was happy with the anesthesia care received and no anesthesia related complications were noted.  I will follow up with the patient again if it is needed.        Last vitals:  Vitals:    02/24/17 1420 02/24/17 1425 02/24/17 1430   BP: 114/62 120/70 119/68   Resp: 18 16 16   Temp:      SpO2: 97%  95%         Electronically Signed By: CHRISTIANE Henry CRNA  February 24, 2017  3:51 PM

## 2017-02-24 NOTE — IP AVS SNAPSHOT
MRN:[id number]                      After Visit Summary   2/24/2017    Misbah Floyd    MRN: [id number]           Thank you!     Thank you for choosing [facility] for your care. Our goal is always to provide you with excellent care. Hearing back from our patients is one way we can continue to improve our services. Please take a few minutes to complete the written survey that you may receive in the mail after you visit with us. Thank you!        Patient Information     Date Of Birth          1970        About your hospital stay     You were admitted on:  February 24, 2017 You last received care in the:  [facility] Phase II    You were discharged on:  February 24, 2017       Who to Call     For medical emergencies, please call 911.  For non-urgent questions about your medical care, please call your primary care provider or clinic, [phone]  For questions related to your surgery, please call your surgery clinic        Attending Provider     Provider Specialty    Solis Lopes MD General Surgery       Primary Care Provider Office Phone # Fax #    Tawanda Aguayo [phone]       [facility] [address] [facility] [address]        After Care Instructions     Diet Instructions       Resume pre-procedure diet            Discharge Instructions       Patient to follow up with appointment in 7-10 days.            Do not - immerse incision in water until sutures removed       Do not immerse incision in water until sutures removed            Dressing       Keep dressing clean and dry.  Dressing / incisional care as instructed by RN and or Surgeon            Ice to affected area       Ice to operative site PRN            No Alcohol       For 24 hours post procedure            No driving or operating machinery        until the day after procedure            No lifting        No lifting over 25 lbs and no strenuous physical activity for 2 weeks             Shower       No shower for 24 hours post procedure. May shower Postoperative Day (POD)  2                  Your next 10 appointments already scheduled     Mar 02, 2017  2:30 PM CST   [facility] with  [facility]   [facility]    [address] [place] [id number]   [phone]            Mar 06, 2017  2:30 PM CST   [facility]    [address] [place] [address]   [phone]            Apr 04, 2017  8:00 AM CDT   Return Sleep Patient with Michael Goldsmith PA-C   [facility] ([facility])    [address] [place] [address]   [phone]              Further instructions from your care team       You may remove your dressing on Sunday afternoon and also shower then. Wash incision with mild soap. You have sutures that need to be removed at your follow up appointment with .  Cover your incision with 2 folded 4 x 4 gauze dressing(s) and secure with paper tape horizontal across the dressing to hold in place. Change daily after the first 48 hours.  [facility]   Adult Discharge Orders & Instructions     For 24 hours after surgery    1. Get plenty of rest.  A responsible adult must stay with you for at least 24 hours after you leave the hospital.   2. Do not drive or use heavy equipment.  If you have weakness or tingling, don't drive or use heavy equipment until this feeling goes away.  3. Do not drink alcohol.  4. Avoid strenuous or risky activities.  Ask for help when climbing stairs.   5. You may feel lightheaded.  IF so, sit for a few minutes before standing.  Have someone help you get up.   6. If you have nausea (feel sick to your stomach): Drink only clear liquids such as apple juice, ginger ale, broth or 7-Up.  Rest may also help.  Be sure to drink enough  "fluids.  Move to a regular diet as you feel able.  7. You may have a slight fever. Call the doctor if your fever is over 100 F (37.7 C) (taken under the tongue) or lasts longer than 24 hours.  8. You may have a dry mouth, a sore throat, muscle aches or trouble sleeping.  These should go away after 24 hours.  9. Do not make important or legal decisions.   Call your doctor for any of the followin.  Signs of infection (fever, growing tenderness at the surgery site, a large amount of drainage or bleeding, severe pain, foul-smelling drainage, redness, swelling).    2. It has been over 8 to 10 hours since surgery and you are still not able to urinate (pass water).    3.  Headache for over 24 hours.       To contact a doctor, call [phone] (during business hours)  [facility] Nurse Advice no. [phone] (24 hr available no.)    Pending Results     Date and Time Order Name Status Description    2017 1325 Surgical pathology exam In process             Admission Information     Date & Time Provider Department Dept. Phone    2017 Solis Lopes MD [facility] Phase [phone]      Your Vitals Were     Blood Pressure Temperature Respirations Weight Pulse Oximetry BMI (Body Mass Index)    119/68 98.2  F (36.8  C) (Axillary) 16 145.2 kg (320 lb) 95% 48.66 kg/m2      MyChart Information     [employer] lets you send messages to your doctor, view your test results, renew your prescriptions, schedule appointments and more. To sign up, go to [email or web address] . Click on \"Log in\" on the left side of the screen, which will take you to the Welcome page. Then click on \"Sign up Now\" on the right side of the page.     You will be asked to enter the access code listed below, as well as some personal information. Please follow the directions to create your username and password.     Your access code is: [email or web address]  Expires: 2017  9:02 AM     Your access code will  in 90 days. If you need help " or a new code, please call your [facility] or [phone].        Care EveryWhere ID     This is your Care EveryWhere ID. This could be used by other organizations to access your [facility] medical records  [id number]           Review of your medicines      START taking        Dose / Directions    HYDROcodone-acetaminophen 5-325 MG per tablet   Commonly known as:  NORCO   Used for:  Post-op pain        Dose:  1-2 tablet   Take 1-2 tablets by mouth every 4 hours as needed for other (Moderate to Severe Pain)   Quantity:  30 tablet   Refills:  0            Where to get your medicines      Some of these will need a paper prescription and others can be bought over the counter. Ask your nurse if you have questions.     Bring a paper prescription for each of these medications     HYDROcodone-acetaminophen 5-325 MG per tablet                Protect others around you: Learn how to safely use, store and throw away your medicines at www.disposemymeds.org.             Medication List: This is a list of all your medications and when to take them. Check marks below indicate your daily home schedule. Keep this list as a reference.      Medications           Morning Afternoon Evening Bedtime As Needed    HYDROcodone-acetaminophen 5-325 MG per tablet   Commonly known as:  NORCO   Take 1-2 tablets by mouth every 4 hours as needed for other (Moderate to Severe Pain)   Last time this was given:  1 tablet on 2/24/2017  3:08 PM

## 2017-02-24 NOTE — ANESTHESIA CARE TRANSFER NOTE
Patient: Misbah Floyd    Procedure(s):  excision right hip mass - Wound Class: I-Clean    Diagnosis: right hip mass  Diagnosis Additional Information: No value filed.    Anesthesia Type:   General     Note:  Airway :Nasal Cannula  Patient transferred to:PACU        Vitals: (Last set prior to Anesthesia Care Transfer)    CRNA VITALS  2/24/2017 1325 - 2/24/2017 1425      2/24/2017             SpO2: 93 %                Electronically Signed By: CHRISTIANE Henyr CRNA  February 24, 2017  3:50 PM

## 2017-02-24 NOTE — BRIEF OP NOTE
[facility] Brief Operative Note    Pre-operative diagnosis: right hip mass   Post-operative diagnosis Same probable lipoma   Procedure: Excision 11.5x9x4 cm right hip mass - probable lipoma   Surgeon: Solis Lopes MD, FACS   Assistants(s): Carey Estes MS3   Estimated blood loss: Less than 10 ml    Specimens: 11.5x9x4 cm hip mass   Findings: 11.5x9x4 cm hip mass, probable lipoma        Solis Lopes MD, FACS      #[id number]

## 2017-02-24 NOTE — DISCHARGE INSTRUCTIONS
You may remove your dressing on  afternoon and also shower then. Wash incision with mild soap. You have sutures that need to be removed at your follow up appointment with .  Cover your incision with 2 folded 4 x 4 gauze dressing(s) and secure with paper tape horizontal across the dressing to hold in place. Change daily after the first 48 hours.  [facility] Same-Day Surgery   Adult Discharge Orders & Instructions     For 24 hours after surgery    1. Get plenty of rest.  A responsible adult must stay with you for at least 24 hours after you leave the hospital.   2. Do not drive or use heavy equipment.  If you have weakness or tingling, don't drive or use heavy equipment until this feeling goes away.  3. Do not drink alcohol.  4. Avoid strenuous or risky activities.  Ask for help when climbing stairs.   5. You may feel lightheaded.  IF so, sit for a few minutes before standing.  Have someone help you get up.   6. If you have nausea (feel sick to your stomach): Drink only clear liquids such as apple juice, ginger ale, broth or 7-Up.  Rest may also help.  Be sure to drink enough fluids.  Move to a regular diet as you feel able.  7. You may have a slight fever. Call the doctor if your fever is over 100 F (37.7 C) (taken under the tongue) or lasts longer than 24 hours.  8. You may have a dry mouth, a sore throat, muscle aches or trouble sleeping.  These should go away after 24 hours.  9. Do not make important or legal decisions.   Call your doctor for any of the followin.  Signs of infection (fever, growing tenderness at the surgery site, a large amount of drainage or bleeding, severe pain, foul-smelling drainage, redness, swelling).    2. It has been over 8 to 10 hours since surgery and you are still not able to urinate (pass water).    3.  Headache for over 24 hours.       To contact a doctor, call [phone] (during business hours)  [facility] Nurse Advice no. [phone] (24 hr available  no.)

## 2017-02-24 NOTE — ANESTHESIA PREPROCEDURE EVALUATION
Anesthesia Evaluation     . Pt has had prior anesthetic.     No history of anesthetic complications     ROS/MED HX    ENT/Pulmonary:     (+)sleep apnea, doesn't use CPAP , . .   (-) tobacco use   Neurologic:  - neg neurologic ROS     Cardiovascular:     (+) Dyslipidemia, ----. : . . . :. .       METS/Exercise Tolerance:     Hematologic:  - neg hematologic  ROS       Musculoskeletal: Comment: Previous thoracic fracture with body cast.  Some pain in his neck and upper back occasionally        GI/Hepatic:  - neg GI/hepatic ROS      (-) GERD   Renal/Genitourinary:     (+) Nephrolithiasis ,       Endo:     (+) Obesity, .      Psychiatric:  - neg psychiatric ROS       Infectious Disease:  - neg infectious disease ROS       Malignancy:      - no malignancy   Other:    - neg other ROS           Physical Exam  Normal systems: cardiovascular, pulmonary and dental    Airway   Mallampati: II  TM distance: >3 FB  Neck ROM: full    Dental     Cardiovascular   Rhythm and rate: regular and normal      Pulmonary    breath sounds clear to auscultation                    Anesthesia Plan      History & Physical Review  History and physical reviewed and following examination; no interval change.    ASA Status:  2 .    NPO Status:  > 8 hours    Plan for General and LMA with Intravenous and Propofol induction. Maintenance will be Balanced and Inhalation.    PONV prophylaxis:  Ondansetron (or other 5HT-3) and Meclizine or Dimenhydrinate       Postoperative Care  Postoperative pain management:  IV analgesics and Oral pain medications.      Consents  Anesthetic plan, risks, benefits and alternatives discussed with:  Patient.  Use of blood products discussed: No .   .                          .

## 2017-02-24 NOTE — OP NOTE
PREOPERATIVE DIAGNOSIS:  Right hip mass.      POSTOPERATIVE DIAGNOSIS:  Right hip mass, probable lipoma.      PROCEDURE:  Excision of 11.5 x 9 x4 cm right hip mass, probable lipoma.      DATE OF PROCEDURE:  2017      SURGEON:  Ernesto Lopes M.D.      ASSISTANT:  Carey Estes MS3      ANESTHESIA:  LMA.      INDICATIONS FOR PROCEDURE:  Mr. Libia Banks is a 46-year-old gentleman with a slowly enlarging right hip mass.  It began to interfere with his clothes and he opted to have it excised.      OPERATIVE FINDINGS:  Included 11.5x9x4 cm mass, probable lipoma.      DETAILS OF PROCEDURE:  The patient was taken to the operating room and placed on the table in supine position.  After induction of anesthesia, the area over the right hip was prepped and draped in sterile fashion.  A timeout was performed and confirmed the identity of the patient as well as the procedure to be performed.        An incision was then made over the mass.  Subcutaneous tissue was opened using cautery.  The mass was seen and we were able to squeeze it out through the wound; it was found to be adherent to some skin on the superior aspect.  That skin was taken off using a #15 blade.  The mass was submitted as specimen.  The mass measured 11.5 x 9 x 4 cm in size.  Hemostasis was achieved using cautery.  Subcutaneous tissue was reapproximated with 3-0 Vicryl.  The skin was closed using vertical mattress 4-0 nylon suture.  Local anesthetic was injected and sterile dressings were applied.      The patient was then taken from the operating room to the recovery room in stable condition to be sent home.         ERNESTO LOPES MD             D: 2017 13:59   T: 2017 14:59   MT: [id number]      Name:     LIBIA BANKS   MRN:      [id number]        Account:        [id number]   :      1970           Procedure Date: 2017      Document: [id number]

## 2017-02-24 NOTE — OR NURSING
Verbal report received from Cynthia BERGERON RN and Wilian LEDEZMA. Phase 1 recovery assumed by Gisel STONE. Pt post-op general anesthesia for right hip mass excision per .

## 2017-02-24 NOTE — IP AVS SNAPSHOT
[facility] Phase II    [address]     [place] [address]    Phone:  [phone]                                       After Visit Summary   2/24/2017    Misbah Floyd    MRN: [id number]           After Visit Summary Signature Page     I have received my discharge instructions, and my questions have been answered. I have discussed any challenges I see with this plan with the nurse or doctor.    ..........................................................................................................................................  Patient/Patient Representative Signature      ..........................................................................................................................................  Patient Representative Print Name and Relationship to Patient    ..................................................               ................................................  Date                                            Time    ..........................................................................................................................................  Reviewed by Signature/Title    ...................................................              ..............................................  Date                                                            Time

## 2017-03-01 LAB — COPATH REPORT: NORMAL

## 2017-03-02 ENCOUNTER — DOCUMENTATION ONLY (OUTPATIENT)
Dept: SLEEP MEDICINE | Facility: CLINIC | Age: 47
End: 2017-03-02

## 2017-03-02 NOTE — PROGRESS NOTES
Patient was offered choice of vendor and chose [facility].  Patient Misbah Floyd was set up at [place] on March 2, 2017. Patient received a Resmed AirCurve 10 Bilevel. Pressures were set at 16/11 cm H2O.   Patient s ramp is 5 cm H2O for 10 min and FLEX/EPR is EPR.  Patient received a Triviala & DropThought Mask name: Simplus  Full Face mask Size Medium, heated tubing and heated humidifier.  Patient is enrolled in the STM Program and does need to meet compliance. Patient has a follow up on 4/4/17 with Michael Goldsmith.    Rekha Woodson

## 2017-03-06 ENCOUNTER — DOCUMENTATION ONLY (OUTPATIENT)
Dept: SLEEP MEDICINE | Facility: CLINIC | Age: 47
End: 2017-03-06

## 2017-03-06 NOTE — PROGRESS NOTES
3 DAY STM VISIT    Patient contacted for 3 day STM visit  Unable to leave message.       Current settings:  Bi-level 16/11 cm H20       Assessment:  Patient has 4 days of use.   Action plan: Pt to have f/u 14 day  STM visit.

## 2017-03-10 ENCOUNTER — OFFICE VISIT (OUTPATIENT)
Dept: SURGERY | Facility: CLINIC | Age: 47
End: 2017-03-10
Payer: COMMERCIAL

## 2017-03-10 VITALS — SYSTOLIC BLOOD PRESSURE: 126 MMHG | HEART RATE: 88 BPM | DIASTOLIC BLOOD PRESSURE: 82 MMHG | TEMPERATURE: 97.7 F

## 2017-03-10 DIAGNOSIS — Z86.018 S/P EXCISION OF LIPOMA: Primary | ICD-10-CM

## 2017-03-10 DIAGNOSIS — Z98.890 S/P EXCISION OF LIPOMA: Primary | ICD-10-CM

## 2017-03-10 PROCEDURE — 99024 POSTOP FOLLOW-UP VISIT: CPT | Performed by: SURGERY

## 2017-03-10 NOTE — PROGRESS NOTES
[facility] NOTE  GENERAL SURGERY    PCP: Tawanda Aguayo         Subjective:     Misbah Floyd is a 46 year old male who presents with Surgical Followup (excision right hip mass done on 02/24/17 done with Marianne)    Pain has been well controlled. Currently is not using pain medications. Eating a Regular and having regular bladder/bowel function. Overall doing very well.         Objective:     /82  Pulse 88  Temp 97.7  F (36.5  C) (Skin)   Constitutional: Awake, alert, in no acute distress.  Respiratory: Non-labored.   Cardiovascular: Regular rate and rhythm.   Abdomen: Incision is Healing well.         Assessment and Plan:       ICD-10-CM    1. S/P excision of lipoma Z98.890     Z86.018          Misbah Floyd is a 46 year old male who presented post operatively and is doing very well. Staples removed. Incision healing well. Pathology reviewed as a benign lipoma with patient.     He may increase his activity.   Follow up with general surgery as needed.      Laura Francisco MD  [facility]

## 2017-03-10 NOTE — MR AVS SNAPSHOT
"              After Visit Summary   3/10/2017    Misbah Floyd    MRN: [id number]           Patient Information     Date Of Birth          1970        Visit Information        Provider Department      3/10/2017 9:00 AM Zohreh Francisco MD [facility]        Today's Diagnoses     S/P excision of lipoma    -  1       Follow-ups after your visit        Your next 10 appointments already scheduled     Apr 04, 2017  8:00 AM CDT   Return Sleep Patient with Michael Goldsmith PA-C   [facility] ([facility])    [address] [place] [id number] [phone]              Who to contact     If you have questions or need follow up information about today's clinic visit or your schedule please contact [facility] directly at [phone].  Normal or non-critical lab and imaging results will be communicated to you by MyChart, letter or phone within 4 business days after the clinic has received the results. If you do not hear from us within 7 days, please contact the clinic through MyChart or phone. If you have a critical or abnormal lab result, we will notify you by phone as soon as possible.  Submit refill requests through [employer] or call your pharmacy and they will forward the refill request to us. Please allow 3 business days for your refill to be completed.          Additional Information About Your Visit        MyChart Information     [employer] lets you send messages to your doctor, view your test results, renew your prescriptions, schedule appointments and more. To sign up, go to [email or web address] . Click on \"Log in\" on the left side of the screen, which will take you to the Welcome page. Then click on \"Sign up Now\" on the right side of the page.     You will be asked to enter the access code listed below, as well as some personal information. Please follow the directions to create your username and password.     Your access code is: " [email or web address]  Expires: 2017  9:02 AM     Your access code will  in 90 days. If you need help or a new code, please call your [facility] or [phone].        Care EveryWhere ID     This is your Care EveryWhere ID. This could be used by other organizations to access your [facility] medical records  [id number]        Your Vitals Were     Pulse Temperature                88 97.7  F (36.5  C) (Skin)           Blood Pressure from Last 3 Encounters:   03/10/17 126/82   17 131/81   17 130/66    Weight from Last 3 Encounters:   17 (!) 320 lb (145.2 kg)   17 (!) 320 lb (145.2 kg)   17 (!) 322 lb (146.1 kg)              Today, you had the following     No orders found for display       Primary Care Provider Office Phone # Fax #    Tawanda Aguayo [phone]       [place] [address] [facility] [address]        Thank you!     Thank you for choosing [facility]  for your care. Our goal is always to provide you with excellent care. Hearing back from our patients is one way we can continue to improve our services. Please take a few minutes to complete the written survey that you may receive in the mail after your visit with us. Thank you!             Your Updated Medication List - Protect others around you: Learn how to safely use, store and throw away your medicines at www.disposemymeds.org.          This list is accurate as of: 3/10/17  9:09 AM.  Always use your most recent med list.                   Brand Name Dispense Instructions for use    HYDROcodone-acetaminophen 5-325 MG per tablet    NORCO    30 tablet    Take 1-2 tablets by mouth every 4 hours as needed for other (Moderate to Severe Pain)       order for DME      Equipment ordered: RESMED BiPAP Mask type: Full face  Settings: 16/11 CM H2O

## 2017-03-10 NOTE — NURSING NOTE
"Chief Complaint   Patient presents with     Surgical Followup     excision right hip mass done on 02/24/17 done with Marianne       Initial /82  Pulse 88  Temp 97.7  F (36.5  C) (Skin) Estimated body mass index is 48.66 kg/(m^2) as calculated from the following:    Height as of 2/21/17: 5' 8\" (1.727 m).    Weight as of 2/24/17: 320 lb (145.2 kg).  Medication Reconciliation: zonia PETERS CMA      "

## 2017-03-14 NOTE — PROGRESS NOTES
EPIC Screening Oximetry Report   URGENT SLEEP STUDY REFERRAL FOR CHF PATIENTS WITH EF 35-50%  FAX WITH TRACINGS TO SLEEP CENTER    This study identifies severity, pattern and frequency of hypoxemia and cannot exclude mild sleep apnea or and may not identified sleep related breathing abnormalities in patients on oxygen or positive airway pressure devices.    Report March 14, 2017  Recording date 2/22/17  Name Misbah Floyd  MRN [id number]           REQUIRED FOR URGENT REFERRAL         Meets criteria?  1. ?  Duration of data collection: 5 hours [> 2 hours continuous artifact free]  2. ?  Condition:    RA           [room air]  3. ?  4% O2 desat index:   13.4/ hour  [>15/hour ]  4. ?  Pattern                episodic     [episodic]      Episodic      (Obstructive sleep apnea, Cheyne Lamb)    Sustained    (Hypoventilation, Lung disease)      SpO2  <88%                          32.8min       [consider supplemental O2]

## 2017-03-15 ENCOUNTER — OFFICE VISIT (OUTPATIENT)
Dept: SURGERY | Facility: CLINIC | Age: 47
End: 2017-03-15
Payer: COMMERCIAL

## 2017-03-15 VITALS — RESPIRATION RATE: 16 BRPM | TEMPERATURE: 100.3 F | HEART RATE: 104 BPM

## 2017-03-15 DIAGNOSIS — L27.0 ANTIBIOTIC RASH: ICD-10-CM

## 2017-03-15 DIAGNOSIS — T36.95XA ANTIBIOTIC RASH: ICD-10-CM

## 2017-03-15 DIAGNOSIS — L03.115 CELLULITIS OF LEG, RIGHT: Primary | ICD-10-CM

## 2017-03-15 PROCEDURE — 99213 OFFICE O/P EST LOW 20 MIN: CPT | Mod: 24 | Performed by: SPECIALIST

## 2017-03-15 RX ORDER — CEPHALEXIN 500 MG/1
500 CAPSULE ORAL 4 TIMES DAILY
Qty: 40 CAPSULE | Refills: 0 | Status: SHIPPED | OUTPATIENT
Start: 2017-03-15 | End: 2017-05-02

## 2017-03-15 NOTE — MR AVS SNAPSHOT
"              After Visit Summary   3/15/2017    Misbah Floyd    MRN: [id number]           Patient Information     Date Of Birth          1970        Visit Information        Provider Department      3/15/2017 4:00 PM Solis Lopes MD [facility]        Today's Diagnoses     Cellulitis of leg, right    -  1    Antibiotic rash           Follow-ups after your visit        Your next 10 appointments already scheduled     Apr 04, 2017  8:00 AM CDT   Return Sleep Patient with Michael Goldsmith PA-C   [facility] ([facility])    [address] [place] [address] [id number] [phone]              Who to contact     If you have questions or need follow up information about today's clinic visit or your schedule please contact [facility] directly at [phone].  Normal or non-critical lab and imaging results will be communicated to you by [email or web address], letter or phone within 4 business days after the clinic has received the results. If you do not hear from us within 7 days, please contact the clinic through MyChart or phone. If you have a critical or abnormal lab result, we will notify you by phone as soon as possible.  Submit refill requests through [email or web address] or call your pharmacy and they will forward the refill request to us. Please allow 3 business days for your refill to be completed.          Additional Information About Your Visit        MyChart Information     [email or web address] lets you send messages to your doctor, view your test results, renew your prescriptions, schedule appointments and more. To sign up, go to [email or web address] . Click on \"Log in\" on the left side of the screen, which will take you to the Welcome page. Then click on \"Sign up Now\" on the right side of the page.     You will be asked to enter the access code listed below, as well as some personal information. Please follow the directions to create your username and " password.     Your access code is: [email or web address]  Expires: 2017 10:02 AM     Your access code will  in 90 days. If you need help or a new code, please call your [facility] or [phone].        Care EveryWhere ID     This is your Care EveryWhere ID. This could be used by other organizations to access your [facility] medical records  [id number]        Your Vitals Were     Pulse Temperature Respirations             104 100.3  F (37.9  C) (Temporal) 16          Blood Pressure from Last 3 Encounters:   03/10/17 126/82   17 131/81   17 130/66    Weight from Last 3 Encounters:   17 (!) 320 lb (145.2 kg)   17 (!) 320 lb (145.2 kg)   17 (!) 322 lb (146.1 kg)              Today, you had the following     No orders found for display         Today's Medication Changes          These changes are accurate as of: 3/15/17  4:01 PM.  If you have any questions, ask your nurse or doctor.               Start taking these medicines.        Dose/Directions    cephALEXin 500 MG capsule   Commonly known as:  KEFLEX   Used for:  Cellulitis of leg, right        Dose:  500 mg   Take 1 capsule (500 mg) by mouth 4 times daily   Quantity:  40 capsule   Refills:  0            Where to get your medicines      These medications were sent to [facility] [place], [place] - [address]   [address] , [facility] [address]     Phone:  [phone]     cephALEXin 500 MG capsule                Primary Care Provider Office Phone # Fax #    Tawanda Aguayo [phone]       [facility] [address] [facility] [address]        Thank you!     Thank you for choosing [facility]  for your care. Our goal is always to provide you with excellent care. Hearing back from our patients is one way we can continue to improve our services. Please take a few minutes to complete the written survey that you may receive in the mail after your visit with  us. Thank you!             Your Updated Medication List - Protect others around you: Learn how to safely use, store and throw away your medicines at www.disposemymeds.org.          This list is accurate as of: 3/15/17  4:01 PM.  Always use your most recent med list.                   Brand Name Dispense Instructions for use    cephALEXin 500 MG capsule    KEFLEX    40 capsule    Take 1 capsule (500 mg) by mouth 4 times daily       HYDROcodone-acetaminophen 5-325 MG per tablet    NORCO    30 tablet    Take 1-2 tablets by mouth every 4 hours as needed for other (Moderate to Severe Pain)       order for DME      Equipment ordered: RESMED BiPAP Mask type: Full face  Settings: 16/11 CM H2O

## 2017-03-15 NOTE — NURSING NOTE
Pt called me yesterday stating that he had stitches in yet from his LOV from last week. Pt has hip mass excision done by Dr. Lopes 2/24/17 and sutures removed by Dr. Francisco last week. Pt comes in today. He does one suture remaining in middle of suture line. He also has skin separation around this suture. Remainder of suture line intact.   Pt was diagnosed with strep throat 2 days ago and has been on Augmentin. Last pradeep he started having pain and swelling in his right lower leg. Saw that he had a rash. Today is limping. Has hot, red skin anterior lower leg almost the whole length, this stops at his sock line. It is not raised, has edema noted in this area.  Dr. Lopes happened to be at the desk, he saw pt and ordered different antibiotic. Pt instructed to elevate his leg as much as possible this pradeep, use ace bandage tomorrow if it helps it feel better. Pt to see his primary if not seeing significant change in 1-2 days. BERTHA Quarles

## 2017-03-15 NOTE — PROGRESS NOTES
F/U for excision of right groin mass      Subjective:  Pt was doing well until Monday when he developed a HA and [facility] - dx with strep throat and started on augmentin.   The next day he developed a rash/redness on his right calf.  Also reports some intraoral lesions.        Objective:  B/P: Data Unavailable, T: 100.3, P: 104, R: 16  HEENT: small raised santhosh intraorally  RLE - incision well healed.   Rash/? Cellulitis distal calf     Assessment/Plan:  Pt s/p excision of groin lipoma - incision well healed.  Was started on abx Monday for strep throat then less than 24hrs later developed right calf rash vs cellulitis not related to surgery.   Also with intraoral lesions.   ? abx rxn.  Will change to keflex.  [facility].      Solis Lopes MD, FACS

## 2017-03-16 ENCOUNTER — TELEPHONE (OUTPATIENT)
Dept: FAMILY MEDICINE | Facility: OTHER | Age: 47
End: 2017-03-16

## 2017-03-16 DIAGNOSIS — L03.119 CELLULITIS OF LOWER EXTREMITY, UNSPECIFIED LATERALITY: Primary | ICD-10-CM

## 2017-03-16 RX ORDER — SULFAMETHOXAZOLE/TRIMETHOPRIM 800-160 MG
1 TABLET ORAL 2 TIMES DAILY
Qty: 20 TABLET | Refills: 0 | Status: SHIPPED | OUTPATIENT
Start: 2017-03-16 | End: 2017-05-02

## 2017-03-16 NOTE — TELEPHONE ENCOUNTER
Reason for Call:  Same Day Appointment, Requested Provider:  Crispin Montesinos PA-C or ANDREW    PCP: Tawanda Aguayo    Reason for visit: Pt has infected leg, it's hot, red, inflammed. He can't stand or walk on it, it will start tingling.     Duration of symptoms: Tuesday night    Have you been treated for this in the past? Yes    Additional comments: Please call and advise. ER stated it's cellulitis. Pt was told to see a provider in [place]. ER put him on Cethalexin 500mg. He has not taken 1 today cause after last night he stated his kidneys were in a lot of pain.     Can we leave a detailed message on this number? YES    Phone number patient can be reached at: [phone]     Best Time: anytime    Call taken on 3/16/2017 at 8:13 AM by Almita Maldonado

## 2017-03-16 NOTE — TELEPHONE ENCOUNTER
"Patient was sen in Urgent care on Monday, 3/13/17, and was diagnosed with strep.  He was treated with   Outside name:   AMOXICILLIN 875MG TABLETS   Local name:   AMOXICILLIN 875 MG PO TABS       (1 dispense in past 1 month)     New   External Pharmacy 3/13/2017 AddDiscard       Sig: TK 1 T PO BID WITH MEALS FOR 10 DAYS  Dispense date: 3/13/2017  Qty: 20.00        Then on Tuesday, he started to feel a hot spot on his leg.  Since then this hot spot has been growing and now his whole leg is red, hat, and swollen.  He was in for a surgical follow up yesterday, 3/15/17, and Dr. Lopes looked at his leg and he was diagnosed with cellulitis.  He was taken off the Amoxicillin and prescribed Keflex  cephALEXin (KEFLEX) 500 MG capsule 40 capsule 0 3/15/2017  --   Sig: Take 1 capsule (500 mg) by mouth 4 times daily     When he started taking the Keflex yesterday, he states he had a terrible \"pulling\" feeling in his kidneys.  He states he cannot take the Keflex again due to this reaction.  He is wanting to know if he can get a different antibiotic that will take care of his Cellulitis and Strep.  PCP is out of office, but patient has been seen by Dr. Hollis and is wondering if he can fix his prescription for him.  Pharmacy is T'd up.  Ada Jefferson RN     "

## 2017-03-17 ENCOUNTER — DOCUMENTATION ONLY (OUTPATIENT)
Dept: SLEEP MEDICINE | Facility: CLINIC | Age: 47
End: 2017-03-17

## 2017-03-17 NOTE — PROGRESS NOTES
14 DAY STM VISIT    Message left for patient to return call    Assessment: Pt not meeting objective benchmarks for AHI and compliance. Message left with patient's wife only phone number listed.   Action plan: Waiting for patient to return call and pt to have 30 day STM visit.   Device settings:    Bi-Level 16/11 cm H20       Objective measures: 14 day rolling measure     % compliance greater than four hours rolling average 14 days: 50 %     95% OF Leak in litres Rolling Average 14 Days: 42.48 lpm last data upload        AHI Rolling Average 14 Day: 7.68  last data upload        Time mask on face 14 day average: 183 min         Objective measure goal  Compliance   Goal >70%  Leak   Goal < 10%  AHI  Goal < 5  Usage  Goal >240

## 2017-03-23 ENCOUNTER — TELEPHONE (OUTPATIENT)
Dept: FAMILY MEDICINE | Facility: OTHER | Age: 47
End: 2017-03-23

## 2017-03-23 NOTE — TELEPHONE ENCOUNTER
Reason for call:  Patient reporting a symptom    Symptom or request: hives     Duration (how long have symptoms been present): 1 day     Have you been treated for this before? No    Additional comments:  Started taking     Phone Number patient can be reached at:  Cell number on file:    Telephone Information:   Mobile [phone]   Mobile [phone]       Best Time:  Any     Can we leave a detailed message on this number:  YES    Call taken on 3/23/2017 at 8:32 AM by Jaimee Maldonado

## 2017-03-24 NOTE — TELEPHONE ENCOUNTER
"Patient is calling to report he is still having issues with his cellulitis.  This started on 3/13/17 with a diagnosis of strep:  Patient was sen in Urgent care on Monday, 3/13/17, and was diagnosed with strep. He was treated with                Outside name:   AMOXICILLIN 875MG TABLETS   Local name:   AMOXICILLIN 875 MG PO TABS       (1 dispense in past 1 month)     New     External Pharmacy 3/13/2017 AddDiscard            Sig: TK 1 T PO BID WITH MEALS FOR 10 DAYS  Dispense date: 3/13/2017  Qty: 20.00          Then on Tuesday, he started to feel a hot spot on his leg. Since then this hot spot has been growing and now his whole leg is red, hat, and swollen. He was in for a surgical follow up yesterday, 3/15/17, and Dr. Lopes looked at his leg and he was diagnosed with cellulitis. He was taken off the Amoxicillin and prescribed Keflex  cephALEXin (KEFLEX) 500 MG capsule 40 capsule 0 3/15/2017   --   Sig: Take 1 capsule (500 mg) by mouth 4 times daily      When he started taking the Keflex yesterday, he states he had a terrible \"pulling\" feeling in his kidneys. He states he cannot take the Keflex again due to this reaction. He is wanting to know if he can get a different antibiotic that will take care of his Cellulitis and Strep. PCP is out of office, but patient has been seen by Dr. Hollis and is wondering if he can fix his prescription for him. Pharmacy is T'd up.    Dr. Hollis then switched patient to Bactrim on 3/16/17 as he was the DP for the day and PCP was out of office that day.  Patient is calling today to report he started taking the Bactrim, then on Tuesday, 3/21/17, he noticed red spots on the back of his hands.  He has eczema so didn't think much of it.  By the next day, 3/22/17, he had red itchy spots all over his body.  He stopped taking the Bactrim and his wife went and got him some Benadryl.  He still has cellulitis in his leg as it is still hot and red and a little bit swollen.  Patient's PCP is " still not in office and there are no openings from [place] to [place].  He would like to be seen as this has been going on for a while and he does not know what to do anymore.    Routing to DP at address.  Ada Jefferson RN

## 2017-03-24 NOTE — TELEPHONE ENCOUNTER
Patient can continue taking benadryl every 6 hours through the weekend. Warm pack leg, discontinue Bactrim and continue Keflex. He should follow up with Tawanda Aguayo on Monday.  Electronically signed by Emilio Cooper MD

## 2017-03-24 NOTE — TELEPHONE ENCOUNTER
Called and spoke to the patient. Informed him of the message below. He said he will continue the Benadryl and try warm packs to the leg. Patient said he really does not want to take the Keflex as it felt like his kidneys were being ripped out when he took it. RN did offer patient could be seen at [facility] to see if they could give him anything different. He said he would like to see how the weekend goes and follow up with Dr. MINER on Monday. Patient did not want to schedule an appointment at this time.     Katie Chapa RN  [facility]

## 2017-04-04 ENCOUNTER — DOCUMENTATION ONLY (OUTPATIENT)
Dept: SLEEP MEDICINE | Facility: CLINIC | Age: 47
End: 2017-04-04

## 2017-04-04 ENCOUNTER — OFFICE VISIT (OUTPATIENT)
Dept: SLEEP MEDICINE | Facility: CLINIC | Age: 47
End: 2017-04-04
Payer: COMMERCIAL

## 2017-04-04 VITALS
HEIGHT: 68 IN | OXYGEN SATURATION: 95 % | SYSTOLIC BLOOD PRESSURE: 153 MMHG | HEART RATE: 67 BPM | WEIGHT: 315 LBS | BODY MASS INDEX: 47.74 KG/M2 | DIASTOLIC BLOOD PRESSURE: 80 MMHG

## 2017-04-04 DIAGNOSIS — G47.33 OSA TREATED WITH BIPAP: Primary | ICD-10-CM

## 2017-04-04 PROCEDURE — 99213 OFFICE O/P EST LOW 20 MIN: CPT | Performed by: PHYSICIAN ASSISTANT

## 2017-04-04 NOTE — MR AVS SNAPSHOT
After Visit Summary   4/4/2017    Misbah Floyd    MRN: [id number]           Patient Information     Date Of Birth          1970        Visit Information        Provider Department      4/4/2017 8:00 AM Michael Goldsmith PA-C [facility] Instructions      Your BMI is Body mass index is 48.35 kg/(m^2).  Weight management is a personal decision.  If you are interested in exploring weight loss strategies, the following discussion covers the approaches that may be successful. Body mass index (BMI) is one way to tell whether you are at a healthy weight, overweight, or obese. It measures your weight in relation to your height.  A BMI of 18.5 to 24.9 is in the healthy range. A person with a BMI of 25 to 29.9 is considered overweight, and someone with a BMI of 30 or greater is considered obese. More than two-thirds of American adults are considered overweight or obese.  Being overweight or obese increases the risk for further weight gain. Excess weight may lead to heart disease and diabetes.  Creating and following plans for healthy eating and physical activity may help you improve your health.  Weight control is part of healthy lifestyle and includes exercise, emotional health, and healthy eating habits. Careful eating habits lifelong are the mainstay of weight control. Though there are significant health benefits from weight loss, long-term weight loss with diet alone may be very difficult to achieve- studies show long-term success with dietary management in less than 10% of people. Attaining a healthy weight may be especially difficult to achieve in those with severe obesity. In some cases, medications, devices and surgical management might be considered.  What can you do?  If you are overweight or obese and are interested in methods for weight loss, you should discuss this with your provider.     Consider reducing daily calorie intake by 500 calories.     Keep a  food journal.     Avoiding skipping meals, consider cutting portions instead.    Diet combined with exercise helps maintain muscle while optimizing fat loss. Strength training is particularly important for building and maintaining muscle mass. Exercise helps reduce stress, increase energy, and improves fitness. Increasing exercise without diet control, however, may not burn enough calories to loose weight.       Start walking three days a week 10-20 minutes at a time    Work towards walking thirty minutes five days a week     Eventually, increase the speed of your walking for 1-2 minutes at time    In addition, we recommend that you review healthy lifestyles and methods for weight loss available through the National Institutes of Health patient information sites:  http://win.niddk.nih.gov/publications/index.htm    And look into health and wellness programs that may be available through your health insurance provider, employer, local community center, or eduarda club.    Weight management plan: Patient was referred to their PCP to discuss a diet and exercise plan.            Follow-ups after your visit        Follow-up notes from your care team     Return in about 4 weeks (around 5/2/2017).      Your next 10 appointments already scheduled     Apr 04, 2017 10:15 AM CDT   Telephone Visit PAP Review 30 Day with VIRTUAL CARE COORDINATOR 2   [facility] ([facility] [place] [facility])    [address] [place] [address], [address] [address] [phone]              Who to contact     If you have questions or need follow up information about today's clinic visit or your schedule please contact [facility] directly at [phone].  Normal or non-critical lab and imaging results will be communicated to you by MyChart, letter or phone within 4 business days after the clinic has received the results. If you do not hear from us within 7 days, please contact the clinic through [facility]  "or phone. If you have a critical or abnormal lab result, we will notify you by phone as soon as possible.  Submit refill requests through [employer] or call your pharmacy and they will forward the refill request to us. Please allow 3 business days for your refill to be completed.          Additional Information About Your Visit        Building [employer] Information     [employer]merce lets you send messages to your doctor, view your test results, renew your prescriptions, schedule appointments and more. To sign up, go to [email or web address] . Click on \"Log in\" on the left side of the screen, which will take you to the Welcome page. Then click on \"Sign up Now\" on the right side of the page.     You will be asked to enter the access code listed below, as well as some personal information. Please follow the directions to create your username and password.     Your access code is: [email or web address]  Expires: 2017 10:02 AM     Your access code will  in 90 days. If you need help or a new code, please call your [place] clinic or [phone].        Care EveryWhere ID     This is your Care EveryWhere ID. This could be used by other organizations to access your [place] medical records  [id number]        Your Vitals Were     Pulse Height Pulse Oximetry BMI (Body Mass Index)          67 1.727 m (5' 8\") 95% 48.35 kg/m2         Blood Pressure from Last 3 Encounters:   17 153/80   03/10/17 126/82   17 131/81    Weight from Last 3 Encounters:   17 (!) 144.2 kg (318 lb)   17 (!) 145.2 kg (320 lb)   17 (!) 145.2 kg (320 lb)              Today, you had the following     No orders found for display       Primary Care Provider Office Phone # Fax #    Tawanda Aguayo [phone]       [facility] [address] [facility] [address]        Thank you!     Thank you for choosing [facility]  for your care. Our goal is always to provide you with excellent " care. Hearing back from our patients is one way we can continue to improve our services. Please take a few minutes to complete the written survey that you may receive in the mail after your visit with us. Thank you!             Your Updated Medication List - Protect others around you: Learn how to safely use, store and throw away your medicines at www.disposemymeds.org.          This list is accurate as of: 4/4/17  8:21 AM.  Always use your most recent med list.                   Brand Name Dispense Instructions for use    cephALEXin 500 MG capsule    KEFLEX    40 capsule    Take 1 capsule (500 mg) by mouth 4 times daily       HYDROcodone-acetaminophen 5-325 MG per tablet    NORCO    30 tablet    Take 1-2 tablets by mouth every 4 hours as needed for other (Moderate to Severe Pain)       order for DME      Equipment ordered: RESMED BiPAP Mask type: Full face  Settings: 16/11 CM H2O       sulfamethoxazole-trimethoprim 800-160 MG per tablet    BACTRIM DS/SEPTRA DS    20 tablet    Take 1 tablet by mouth 2 times daily

## 2017-04-04 NOTE — PROGRESS NOTES
"  Obstructive Sleep Apnea- PAP Follow-Up Visit:    Chief Complaint   Patient presents with     Sleep Problem     go over oximiter and cpap       Misbah Floyd comes in today for follow-up of their severe sleep apnea, managed with BPAP 16/11. Download shows 100% compliance over 12 day period, average usage 3 hours 50 minutes. Leak 42.2, AHI 1.9. He feels better when he wears it, but has had problems with the humidifier running dry, as well as mask leak. He  Feels he has been adjusting better to the machine over the last couple of days.     Past medical/surgical history, family history, social history, medications and allergies were reviewed.      Problem List:  Patient Active Problem List    Diagnosis Date Noted     Morbid obesity due to excess calories (H) 01/20/2017     Priority: Medium     CITLALLI (obstructive sleep apnea) 01/20/2017     Priority: Medium     Abscess of anal and rectal regions 02/19/2007        /80  Pulse 67  Ht 1.727 m (5' 8\")  Wt (!) 144.2 kg (318 lb)  SpO2 95%  BMI 48.35 kg/m2        Impression/Plan:  Severe Sleep apnea. He is Tolerating PAP fairly well. Daytime symptoms are improved. He will work on increasing usage. We will have his humidifier settings looked at as well. We will check overnight oximetry while on the BiPAP to ensure resolution of hypoxia .     Misbah Floyd will follow up in about 6 week(s).     Fifteen minutes spent with patient, all of which were spent face-to-face counseling, consulting, coordinating plan of care.            CC:  Tawanda Aguayo,       "

## 2017-04-04 NOTE — PROGRESS NOTES
30 DAY [facility] VISIT    Data only recheck     Assessment: Pt not meeting objective benchmarks for leak and compliance. Patient met with his Provider today and no changes were made. Will check on patient in 2 weeks.   Action plan: Pt place into sleep therapy coaching group   Patient has a follow up visit with Michael Goldsmith on 5/2/2017.   Device settings:    EPAP Min Auto CPAP/ASV: 0 (The minimum allowable pressure in cmH2O)    EPAP Max Auto CPAP/ASV: 0 (The maximum allowable pressure in cmH2O)    Target IPAP (95% of Target) 14 day average (Resmed): 4.1cm H20    Objective measures: 14 day rolling measures      % compliance greater than four hours rolling average 14 days: [id number] %     95% OF Leak in litres Rolling Average 14 Days: 38.4 lpm  last  upload     AHI Rolling Average 14 Day: 1.71 last  upload     Time mask on face 14 day average: 226 min        Objective measure goal  Compliance   Goal >70%  Leak   Goal < 10%  AHI  Goal < 5  Usage  Goal >240

## 2017-04-04 NOTE — NURSING NOTE
"Chief Complaint   Patient presents with     Sleep Problem     go over oximiter and cpap       Initial /80  Pulse 67  Ht 1.727 m (5' 8\")  Wt (!) 144.2 kg (318 lb)  SpO2 95%  BMI 48.35 kg/m2 Estimated body mass index is 48.35 kg/(m^2) as calculated from the following:    Height as of this encounter: 1.727 m (5' 8\").    Weight as of this encounter: 144.2 kg (318 lb).  Medication Reconciliation: complete    "

## 2017-04-10 ENCOUNTER — OFFICE VISIT (OUTPATIENT)
Dept: SLEEP MEDICINE | Facility: CLINIC | Age: 47
End: 2017-04-10
Payer: COMMERCIAL

## 2017-04-10 DIAGNOSIS — G47.33 OBSTRUCTIVE SLEEP APNEA (ADULT) (PEDIATRIC): Primary | ICD-10-CM

## 2017-04-10 PROCEDURE — 94762 N-INVAS EAR/PLS OXIMTRY CONT: CPT | Performed by: OTOLARYNGOLOGY

## 2017-04-10 NOTE — PROGRESS NOTES
Patient was educated on overnight oximetry and will be bringing the unit back tomorrow.  Will follow up with provider for finally results.  .Noemi Ness

## 2017-04-10 NOTE — MR AVS SNAPSHOT
"              After Visit Summary   4/10/2017    Misbah Floyd    MRN: [id number]           Patient Information     Date Of Birth          1970        Visit Information        Provider Department      4/10/2017 4:00 PM PH BED 3 [facility]        Today's Diagnoses     Obstructive sleep apnea (adult) (pediatric)    -  1       Follow-ups after your visit        Your next 10 appointments already scheduled     Apr 11, 2017  4:00 PM CDT   Oximetry Drop Off with PH BED 3   [facility] ([facility])    [address] [place] [address] [phone]            May 02, 2017  8:00 AM CDT   Return Sleep Patient with Michael Goldsmith PA-C   [facility] ([facility])    [address] [place] [address] [phone]              Who to contact     If you have questions or need follow up information about today's clinic visit or your schedule please contact [facility] directly at [phone].  Normal or non-critical lab and imaging results will be communicated to you by [employer], letter or phone within 4 business days after the clinic has received the results. If you do not hear from us within 7 days, please contact the clinic through [employer] or phone. If you have a critical or abnormal lab result, we will notify you by phone as soon as possible.  Submit refill requests through [employer] or call your pharmacy and they will forward the refill request to us. Please allow 3 business days for your refill to be completed.          Additional Information About Your Visit        [employer] Information     [employer] lets you send messages to your doctor, view your test results, renew your prescriptions, schedule appointments and more. To sign up, go to [email or web address] . Click on \"Log in\" on the left side of the screen, which will take you to the Welcome page. Then click on \"Sign up " "Now\" on the right side of the page.     You will be asked to enter the access code listed below, as well as some personal information. Please follow the directions to create your username and password.     Your access code is: [email or web address]  Expires: 2017 10:02 AM     Your access code will  in 90 days. If you need help or a new code, please call your [place] clinic or [phone].        Care EveryWhere ID     This is your Care EveryWhere ID. This could be used by other organizations to access your [place] medical records  [id number]         Blood Pressure from Last 3 Encounters:   17 153/80   03/10/17 126/82   17 131/81    Weight from Last 3 Encounters:   17 (!) 144.2 kg (318 lb)   17 (!) 145.2 kg (320 lb)   17 (!) 145.2 kg (320 lb)              Today, you had the following     No orders found for display       Primary Care Provider Office Phone # Fax #    Tawanda Aguayo [phone]       [facility] [address] [facility] [address]        Thank you!     Thank you for choosing [facility]  for your care. Our goal is always to provide you with excellent care. Hearing back from our patients is one way we can continue to improve our services. Please take a few minutes to complete the written survey that you may receive in the mail after your visit with us. Thank you!             Your Updated Medication List - Protect others around you: Learn how to safely use, store and throw away your medicines at www.disposemymeds.org.          This list is accurate as of: 4/10/17  5:15 PM.  Always use your most recent med list.                   Brand Name Dispense Instructions for use    cephALEXin 500 MG capsule    KEFLEX    40 capsule    Take 1 capsule (500 mg) by mouth 4 times daily       HYDROcodone-acetaminophen 5-325 MG per tablet    NORCO    30 tablet    Take 1-2 tablets by mouth every 4 hours as needed for other " (Moderate to Severe Pain)       order for DME      Equipment ordered: RESMED BiPAP Mask type: Full face  Settings: 16/11 CM H2O       sulfamethoxazole-trimethoprim 800-160 MG per tablet    BACTRIM DS/SEPTRA DS    20 tablet    Take 1 tablet by mouth 2 times daily

## 2017-04-20 ENCOUNTER — OFFICE VISIT (OUTPATIENT)
Dept: SLEEP MEDICINE | Facility: CLINIC | Age: 47
End: 2017-04-20

## 2017-04-20 DIAGNOSIS — G47.33 OBSTRUCTIVE SLEEP APNEA (ADULT) (PEDIATRIC): Primary | ICD-10-CM

## 2017-04-20 NOTE — MR AVS SNAPSHOT
"              After Visit Summary   4/20/2017    Misbah Floyd    MRN: [id number]           Patient Information     Date Of Birth          1970        Visit Information        Provider Department      4/20/2017 4:00 PM PH BED 3 [facility]        Today's Diagnoses     Obstructive sleep apnea (adult) (pediatric)    -  1       Follow-ups after your visit        Your next 10 appointments already scheduled     May 02, 2017  8:00 AM CDT   Return Sleep Patient with Michael Goldsmith PA-C   [facility] ([facility])    [address] [place] [address] [phone]              Who to contact     If you have questions or need follow up information about today's clinic visit or your schedule please contact [facility] directly at [phone].  Normal or non-critical lab and imaging results will be communicated to you by [employer], letter or phone within 4 business days after the clinic has received the results. If you do not hear from us within 7 days, please contact the clinic through [employer] or phone. If you have a critical or abnormal lab result, we will notify you by phone as soon as possible.  Submit refill requests through [employer] or call your pharmacy and they will forward the refill request to us. Please allow 3 business days for your refill to be completed.          Additional Information About Your Visit        MyChart Information     [employer] lets you send messages to your doctor, view your test results, renew your prescriptions, schedule appointments and more. To sign up, go to [email or web address] . Click on \"Log in\" on the left side of the screen, which will take you to the Welcome page. Then click on \"Sign up Now\" on the right side of the page.     You will be asked to enter the access code listed below, as well as some personal information. Please follow the directions to create your username and " password.     Your access code is: [email or web address]  Expires: 2017  4:13 PM     Your access code will  in 90 days. If you need help or a new code, please call your [place] clinic or [phone].        Care EveryWhere ID     This is your Care EveryWhere ID. This could be used by other organizations to access your [place] medical records  [id number]         Blood Pressure from Last 3 Encounters:   17 153/80   03/10/17 126/82   17 131/81    Weight from Last 3 Encounters:   17 (!) 144.2 kg (318 lb)   17 (!) 145.2 kg (320 lb)   17 (!) 145.2 kg (320 lb)              Today, you had the following     No orders found for display       Primary Care Provider Office Phone # Fax #    Tawanda Aguaoy [phone]       [address] [address]        Thank you!     Thank you for choosing [facility]  for your care. Our goal is always to provide you with excellent care. Hearing back from our patients is one way we can continue to improve our services. Please take a few minutes to complete the written survey that you may receive in the mail after your visit with us. Thank you!             Your Updated Medication List - Protect others around you: Learn how to safely use, store and throw away your medicines at www.disposemymeds.org.          This list is accurate as of: 17  4:13 PM.  Always use your most recent med list.                   Brand Name Dispense Instructions for use    cephALEXin 500 MG capsule    KEFLEX    40 capsule    Take 1 capsule (500 mg) by mouth 4 times daily       HYDROcodone-acetaminophen 5-325 MG per tablet    NORCO    30 tablet    Take 1-2 tablets by mouth every 4 hours as needed for other (Moderate to Severe Pain)       order for DME      Equipment ordered: RESMED BiPAP Mask type: Full face  Settings: 16/11 CM H2O       sulfamethoxazole-trimethoprim 800-160 MG per tablet    BACTRIM  DS/SEPTRA DS    20 tablet    Take 1 tablet by mouth 2 times daily

## 2017-04-20 NOTE — PROGRESS NOTES
Patient was educated on overnight oximetry and will be bring unit back tomorrow and following up with provider.Noemi Ness

## 2017-04-26 NOTE — PROGRESS NOTES
EPIC Screening Oximetry Report   URGENT SLEEP STUDY REFERRAL FOR CHF PATIENTS WITH EF 35-50%  FAX WITH TRACINGS TO SLEEP CENTER    This study identifies severity, pattern and frequency of hypoxemia and cannot exclude mild sleep apnea or and may not identified sleep related breathing abnormalities in patients on oxygen or positive airway pressure devices.    Report April 26, 2017  Recording date 4/20/17  Name Misbah Floyd  MRN [id number]           REQUIRED FOR URGENT REFERRAL         Meets criteria?  1. ?  Duration of data collection: 5:47 hours [> 2 hours continuous artifact free]  2. ?  Condition:    RA           [room air]  3. ?  4% O2 desat index:   1.4/ hour  [>15/hour ]  4. ?  Pattern                episodic     [episodic]      Episodic      (Obstructive sleep apnea, Cheyne Lamb)    Sustained    (Hypoventilation, Lung disease)      SpO2  <88%                          0.2min       [no requirement]       Normal Oximetry

## 2017-05-02 ENCOUNTER — OFFICE VISIT (OUTPATIENT)
Dept: SLEEP MEDICINE | Facility: CLINIC | Age: 47
End: 2017-05-02
Payer: COMMERCIAL

## 2017-05-02 VITALS
BODY MASS INDEX: 47.74 KG/M2 | SYSTOLIC BLOOD PRESSURE: 144 MMHG | WEIGHT: 315 LBS | HEIGHT: 68 IN | DIASTOLIC BLOOD PRESSURE: 76 MMHG | OXYGEN SATURATION: 95 % | HEART RATE: 72 BPM

## 2017-05-02 DIAGNOSIS — G47.33 OSA (OBSTRUCTIVE SLEEP APNEA): Primary | ICD-10-CM

## 2017-05-02 PROCEDURE — 99213 OFFICE O/P EST LOW 20 MIN: CPT | Performed by: PHYSICIAN ASSISTANT

## 2017-05-02 NOTE — MR AVS SNAPSHOT
After Visit Summary   5/2/2017    Misbah Floyd    MRN: [id number]           Patient Information     Date Of Birth          1970        Visit Information        Provider Department      5/2/2017 8:00 AM Michael Goldsmith PA-C [facility]        Today's Diagnoses     CITLALLI (obstructive sleep apnea)    -  1      Care Instructions      Your BMI is Body mass index is 48.35 kg/(m^2).  Weight management is a personal decision.  If you are interested in exploring weight loss strategies, the following discussion covers the approaches that may be successful. Body mass index (BMI) is one way to tell whether you are at a healthy weight, overweight, or obese. It measures your weight in relation to your height.  A BMI of 18.5 to 24.9 is in the healthy range. A person with a BMI of 25 to 29.9 is considered overweight, and someone with a BMI of 30 or greater is considered obese. More than two-thirds of American adults are considered overweight or obese.  Being overweight or obese increases the risk for further weight gain. Excess weight may lead to heart disease and diabetes.  Creating and following plans for healthy eating and physical activity may help you improve your health.  Weight control is part of healthy lifestyle and includes exercise, emotional health, and healthy eating habits. Careful eating habits lifelong are the mainstay of weight control. Though there are significant health benefits from weight loss, long-term weight loss with diet alone may be very difficult to achieve- studies show long-term success with dietary management in less than 10% of people. Attaining a healthy weight may be especially difficult to achieve in those with severe obesity. In some cases, medications, devices and surgical management might be considered.  What can you do?  If you are overweight or obese and are interested in methods for weight loss, you should discuss this with your provider.      Consider reducing daily calorie intake by 500 calories.     Keep a food journal.     Avoiding skipping meals, consider cutting portions instead.    Diet combined with exercise helps maintain muscle while optimizing fat loss. Strength training is particularly important for building and maintaining muscle mass. Exercise helps reduce stress, increase energy, and improves fitness. Increasing exercise without diet control, however, may not burn enough calories to loose weight.       Start walking three days a week 10-20 minutes at a time    Work towards walking thirty minutes five days a week     Eventually, increase the speed of your walking for 1-2 minutes at time    In addition, we recommend that you review healthy lifestyles and methods for weight loss available through the National Institutes of Health patient information sites:  http://win.niddk.nih.gov/publications/index.htm    And look into health and wellness programs that may be available through your health insurance provider, employer, local community center, or eduarda club.    Weight management plan: Patient was referred to their PCP to discuss a diet and exercise plan.            Follow-ups after your visit        Follow-up notes from your care team     Return in about 1 year (around 5/2/2018).      Your next 10 appointments already scheduled     Feb 23, 2018  8:00 AM CST   Return Sleep Patient with Michael Goldsmith PA-C   [facility] ([facility])    [address] [place] [id number] [phone]              Who to contact     If you have questions or need follow up information about today's clinic visit or your schedule please contact [facility] directly at [phone].  Normal or non-critical lab and imaging results will be communicated to you by MyChart, letter or phone within 4 business days after the clinic has received the results. If you do not hear from us within 7  "days, please contact the clinic through [employer] or phone. If you have a critical or abnormal lab result, we will notify you by phone as soon as possible.  Submit refill requests through [employer] or call your pharmacy and they will forward the refill request to us. Please allow 3 business days for your refill to be completed.          Additional Information About Your Visit        [employer] Information     [employer] lets you send messages to your doctor, view your test results, renew your prescriptions, schedule appointments and more. To sign up, go to [email or web address] . Click on \"Log in\" on the left side of the screen, which will take you to the Welcome page. Then click on \"Sign up Now\" on the right side of the page.     You will be asked to enter the access code listed below, as well as some personal information. Please follow the directions to create your username and password.     Your access code is: [email or web address]  Expires: 2017  4:13 PM     Your access code will  in 90 days. If you need help or a new code, please call your [place] clinic or [phone].        Care EveryWhere ID     This is your Care EveryWhere ID. This could be used by other organizations to access your [place] medical records  [id number]        Your Vitals Were     Pulse Height Pulse Oximetry BMI (Body Mass Index)          72 1.727 m (5' 8\") 95% 48.35 kg/m2         Blood Pressure from Last 3 Encounters:   17 144/76   17 153/80   03/10/17 126/82    Weight from Last 3 Encounters:   17 (!) 144.2 kg (318 lb)   17 (!) 144.2 kg (318 lb)   17 (!) 145.2 kg (320 lb)              Today, you had the following     No orders found for display         Today's Medication Changes          These changes are accurate as of: 17  8:37 AM.  If you have any questions, ask your nurse or doctor.               Stop taking these medicines if you haven't already. Please contact your care team if you have questions.     " cephALEXin 500 MG capsule   Commonly known as:  KEFLEX   Stopped by:  Michael Goldsmith PA-C           HYDROcodone-acetaminophen 5-325 MG per tablet   Commonly known as:  NORCO   Stopped by:  Michael Goldsmith PA-C           sulfamethoxazole-trimethoprim 800-160 MG per tablet   Commonly known as:  BACTRIM DS/SEPTRA DS   Stopped by:  Michael Goldsmith PA-C                    Primary Care Provider Office Phone # Fax #    Tawanda Aguayo [phone]       [facility] [address] [facility] [address]        Thank you!     Thank you for choosing [facility] [place]  for your care. Our goal is always to provide you with excellent care. Hearing back from our patients is one way we can continue to improve our services. Please take a few minutes to complete the written survey that you may receive in the mail after your visit with us. Thank you!             Your Updated Medication List - Protect others around you: Learn how to safely use, store and throw away your medicines at www.disposemymeds.org.          This list is accurate as of: 5/2/17  8:37 AM.  Always use your most recent med list.                   Brand Name Dispense Instructions for use    order for DME      Equipment ordered: RESMED BiPAP Mask type: Full face  Settings: 16/11 CM H2O

## 2017-05-02 NOTE — PROGRESS NOTES
"Obstructive Sleep Apnea- PAP Follow-Up Visit:    Chief Complaint   Patient presents with     Sleep Problem     go over oximter       Misbah Floyd comes in today for follow-up of their severe sleep apnea, managed with BPAP 16/11. Download shows 100% compliance, average usage is 5 hours 30 minutes which is the average amount of sleep he gets on any given night. Still having problems with leak (27.1), but that is improving. AHI is 0.7. He is feeling much less fatigued/sleepy during the day.         Past medical/surgical history, family history, social history, medications and allergies were reviewed.      Problem List:  Patient Active Problem List    Diagnosis Date Noted     Morbid obesity due to excess calories (H) 01/20/2017     Priority: Medium     CITLALLI (obstructive sleep apnea) 01/20/2017     Priority: Medium     Abscess of anal and rectal regions 02/19/2007        /76  Pulse 72  Ht 1.727 m (5' 8\")  Wt (!) 144.2 kg (318 lb)  SpO2 95%  BMI 48.35 kg/m2        Impression/Plan:  Severe Sleep apnea. He is Tolerating PAP well. Daytime symptoms are improved..     Misbah Floyd will follow up in about 1 year(s).     Fifteen minutes spent with patient, all of which were spent face-to-face counseling, consulting, coordinating plan of care.            CC:  Tawanda Aguayo,    "

## 2017-05-02 NOTE — NURSING NOTE
"Chief Complaint   Patient presents with     Sleep Problem     go over oximter       Initial /76  Pulse 72  Ht 1.727 m (5' 8\")  Wt (!) 144.2 kg (318 lb)  SpO2 95%  BMI 48.35 kg/m2 Estimated body mass index is 48.35 kg/(m^2) as calculated from the following:    Height as of this encounter: 1.727 m (5' 8\").    Weight as of this encounter: 144.2 kg (318 lb).  Medication Reconciliation: complete    "

## 2017-05-02 NOTE — PATIENT INSTRUCTIONS

## 2017-06-16 ENCOUNTER — TELEPHONE (OUTPATIENT)
Dept: FAMILY MEDICINE | Facility: OTHER | Age: 47
End: 2017-06-16

## 2017-06-16 DIAGNOSIS — Z91.030 ALLERGY TO BEE STING: Primary | ICD-10-CM

## 2017-06-16 NOTE — TELEPHONE ENCOUNTER
Reason for Call:  Medication or medication refill:    Do you use a [facility]?  Name of the pharmacy and phone number for the current request:  [facility] - [phone]    Name of the medication requested: Epi Pen    Other request: Misbah received an epi pen from an urgent care in 2013, he is requesting a refill be sent in from his pcp. 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    Can we leave a detailed message on this number? YES    Phone number patient can be reached at: Home number on file [phone] (home)    Best Time:     Call taken on 6/16/2017 at 3:05 PM by Kerry Thomas

## 2017-06-19 RX ORDER — EPINEPHRINE 0.3 MG/.3ML
0.3 INJECTION SUBCUTANEOUS
Qty: 0.6 ML | Refills: 3 | Status: SHIPPED | OUTPATIENT
Start: 2017-06-19 | End: 2022-12-06

## 2017-06-19 RX ORDER — EPINEPHRINE 0.15 MG/.3ML
0.15 INJECTION INTRAMUSCULAR PRN
Qty: 0.6 ML | Refills: 1 | Status: SHIPPED | OUTPATIENT
Start: 2017-06-19 | End: 2017-06-19

## 2017-09-13 ENCOUNTER — DOCUMENTATION ONLY (OUTPATIENT)
Dept: SLEEP MEDICINE | Facility: CLINIC | Age: 47
End: 2017-09-13

## 2017-09-13 NOTE — PROGRESS NOTES
6 month [facility]    Data only recheck    Device settings: Bi-Level  16/11 cm H20      Objective measures: 14 day rolling measures      Compliance   (Goal >70%)  --% compliance greater than four hours rolling average 14 days: 100 %      Leak   (Goal < 24 lpm) --95% OF Leak in litres Rolling Average 14 Days: 53.36 lpm      AHI  (Goal < 5)  --AHI Rolling Average 14 Day: 0.11      Usage  (Goal >240)  --Time mask on face 14 day average: 351 min      Assessment:Pt meeting objective benchmarks.      Action plan: Pt to follow up per provider request (1-2 yrs)

## 2018-02-06 DIAGNOSIS — G47.33 OSA (OBSTRUCTIVE SLEEP APNEA): Primary | ICD-10-CM

## 2018-02-23 ENCOUNTER — OFFICE VISIT (OUTPATIENT)
Dept: SLEEP MEDICINE | Facility: CLINIC | Age: 48
End: 2018-02-23
Payer: COMMERCIAL

## 2018-02-23 VITALS
OXYGEN SATURATION: 96 % | BODY MASS INDEX: 45.25 KG/M2 | RESPIRATION RATE: 16 BRPM | HEIGHT: 68 IN | HEART RATE: 65 BPM | WEIGHT: 298.6 LBS | SYSTOLIC BLOOD PRESSURE: 130 MMHG | DIASTOLIC BLOOD PRESSURE: 62 MMHG

## 2018-02-23 DIAGNOSIS — G47.33 OSA (OBSTRUCTIVE SLEEP APNEA): Primary | ICD-10-CM

## 2018-02-23 PROCEDURE — 99213 OFFICE O/P EST LOW 20 MIN: CPT | Performed by: PHYSICIAN ASSISTANT

## 2018-02-23 NOTE — MR AVS SNAPSHOT
"              After Visit Summary   2018    Misbah Floyd    MRN: [id number]           Patient Information     Date Of Birth          1970        Visit Information        Provider Department      2018 8:00 AM Michael Goldsmith PA-C [facility]        Today's Diagnoses     CITLALLI (obstructive sleep apnea)    -  1       Follow-ups after your visit        Follow-up notes from your care team     Return in about 1 year (around 2019).      Who to contact     If you have questions or need follow up information about today's clinic visit or your schedule please contact [facility] directly at [phone].  Normal or non-critical lab and imaging results will be communicated to you by MyChart, letter or phone within 4 business days after the clinic has received the results. If you do not hear from us within 7 days, please contact the clinic through MyChart or phone. If you have a critical or abnormal lab result, we will notify you by phone as soon as possible.  Submit refill requests through [employer] or call your pharmacy and they will forward the refill request to us. Please allow 3 business days for your refill to be completed.          Additional Information About Your Visit        MyChart Information     [employer] lets you send messages to your doctor, view your test results, renew your prescriptions, schedule appointments and more. To sign up, go to [email or web address] . Click on \"Log in\" on the left side of the screen, which will take you to the Welcome page. Then click on \"Sign up Now\" on the right side of the page.     You will be asked to enter the access code listed below, as well as some personal information. Please follow the directions to create your username and password.     Your access code is: [email or web address]  Expires: 2018  8:20 AM     Your access code will  in 90 days. If you need help or a new code, please call your [place] clinic " "or [phone].        Care EveryWhere ID     This is your Care EveryWhere ID. This could be used by other organizations to access your [place] medical records  [id number]        Your Vitals Were     Pulse Respirations Height Pulse Oximetry BMI (Body Mass Index)       65 16 1.727 m (5' 8\") 96% 45.4 kg/m2        Blood Pressure from Last 3 Encounters:   02/23/18 130/62   05/02/17 144/76   04/04/17 153/80    Weight from Last 3 Encounters:   02/23/18 135.4 kg (298 lb 9.6 oz)   05/02/17 (!) 144.2 kg (318 lb)   04/04/17 (!) 144.2 kg (318 lb)              We Performed the Following     Comprehensive DME        Primary Care Provider Office Phone # Fax #    [facility] [phone]       [address] [facility] [address]        Equal Access to Services     JULIO CÉSAR GOMEZ : Hadii haylie hennessyo Sotam, waaxda luqadaha, qaybta kaalmada adeegyada, kristie menendez. So [facility] [phone].    ATENCIÓN: Si habla español, tiene a matt disposición servicios gratuitos de asistencia lingüística. Llame al [phone].    We comply with applicable federal civil rights laws and [place] laws. We do not discriminate on the basis of race, color, national origin, age, disability, sex, sexual orientation, or gender identity.            Thank you!     Thank you for choosing [facility]  for your care. Our goal is always to provide you with excellent care. Hearing back from our patients is one way we can continue to improve our services. Please take a few minutes to complete the written survey that you may receive in the mail after your visit with us. Thank you!             Your Updated Medication List - Protect others around you: Learn how to safely use, store and throw away your medicines at www.disposemymeds.org.          This list is accurate as of 2/23/18  8:20 AM.  Always use your most recent med list.                   Brand Name Dispense Instructions for use " Diagnosis    EPINEPHrine 0.3 MG/0.3ML injection 2-pack    EPIPEN 2-LEONARDO    0.6 mL    Inject 0.3 mLs (0.3 mg) into the muscle once as needed for anaphylaxis    Allergy to bee sting       order for DME      Equipment ordered: RESMED BiPAP Mask type: Full face  Settings: 16/11 CM H2O

## 2018-02-23 NOTE — PROGRESS NOTES
"Obstructive Sleep Apnea- PAP Follow-Up Visit:    Chief Complaint   Patient presents with     CPAP Follow Up     yearly compliance       Misbah Floyd comes in today for follow-up of their severe sleep apnea, managed with BPAP 16/11.     No specialty comments available.    Overall, he rates the experience with PAP as 9 (0 poor, 10 great). The mask is comfortable.    The mask is leaking at his corners of the mouth.  The mask is leaking most nights per week.  He is not snoring with the mask on. He is not having gasp arousals.  He is not having significant oral/nasal dryness. The pressure is comfortable.     His PAP interface is Full Face Mask.      Patient is using PAP therapy 5-8 hours per night. The patient is usually getting 5-8 hours of sleep per night.    He does feel rested in the morning.    [place] Sleepiness Scale: 3/24      [employer]   CPAP 0 cmH2O 30 day usage data:  100% of days with > 4 hours of use. 0/30 days with no use. Average use 355 minutes per day.   95%ile Leak 90.89 L/min.   AHI 0.16 events per hour.     Auto-PAP 0 - 0 cmH2O 30 day usage data:    100% of days with > 4 hours of use. 0/30 days with no use. Average use 355 minutes per day.   95%ile Leak 90.89 L/min.   CPAP 95% pressure 0 cm.   AHI 0.16 events per hour.         Past medical/surgical history, family history, social history, medications and allergies were reviewed.      Problem List:  Patient Active Problem List    Diagnosis Date Noted     Morbid obesity due to excess calories (H) 01/20/2017     Priority: Medium     CITLALLI (obstructive sleep apnea) 01/20/2017     Priority: Medium     Abscess of anal and rectal regions 02/19/2007     Priority: Medium        /62  Pulse 65  Resp 16  Ht 1.727 m (5' 8\")  Wt 135.4 kg (298 lb 9.6 oz)  SpO2 96%  BMI 45.4 kg/m2    [place]=3      Impression/Plan:     Severe Sleep apnea. He is Tolerating PAP very  well. Daytime symptoms are improved..       Misbah Floyd will follow up in about 1 " year(s).     Fifteen minutes spent with patient, all of which were spent face-to-face counseling, consulting, coordinating plan of care.            CC:  [facility],

## 2018-02-23 NOTE — NURSING NOTE
"Chief Complaint   Patient presents with     CPAP Follow Up     yearly compliance       Initial /62  Pulse 65  Resp 16  Ht 1.727 m (5' 8\")  Wt 135.4 kg (298 lb 9.6 oz)  SpO2 96%  BMI 45.4 kg/m2 Estimated body mass index is 45.4 kg/(m^2) as calculated from the following:    Height as of this encounter: 1.727 m (5' 8\").    Weight as of this encounter: 135.4 kg (298 lb 9.6 oz).  Medication Reconciliation: complete     [place]=3    Trista Garza CMA         "

## 2019-03-07 ENCOUNTER — OFFICE VISIT (OUTPATIENT)
Dept: SLEEP MEDICINE | Facility: CLINIC | Age: 49
End: 2019-03-07
Payer: COMMERCIAL

## 2019-03-07 VITALS
HEART RATE: 64 BPM | SYSTOLIC BLOOD PRESSURE: 134 MMHG | HEIGHT: 68 IN | BODY MASS INDEX: 46.53 KG/M2 | OXYGEN SATURATION: 95 % | DIASTOLIC BLOOD PRESSURE: 75 MMHG | WEIGHT: 307 LBS

## 2019-03-07 DIAGNOSIS — G47.33 OSA (OBSTRUCTIVE SLEEP APNEA): Primary | ICD-10-CM

## 2019-03-07 PROCEDURE — 99213 OFFICE O/P EST LOW 20 MIN: CPT | Performed by: OTOLARYNGOLOGY

## 2019-03-07 ASSESSMENT — MIFFLIN-ST. JEOR: SCORE: 2242.04

## 2019-03-07 NOTE — PROGRESS NOTES
Obstructive Sleep Apnea - PAP Follow-Up Visit:    Chief Complaint   Patient presents with     CPAP Follow Up       Misbah Floyd comes in today for follow-up of their very severe sleep apnea, managed with BIPAP.     No specialty comments available.    Overall, he rates the experience with PAP as 10 (0 poor, 10 great). The mask is comfortable.   The mask is leaking at his nose.  The mask is leaking 7 nights per week.  He is not snoring with the mask on. He is not having gasp arousals.  He is not having significant oral/nasal dryness. The pressure is comfortable.   His PAP interface is Full Face Mask.  The leak does not appear to bother him.  The patient is usually getting 4 to 5 hours of sleep per night.(due to his work schedule)    He does not feel rested in the morning.    No Data Recorded      No Data Recorded    ResMed        BIPAP  -16/11 cmH2O 30 day usage data:    96% of days with > 4 hours of use. 0/30 days with no use.   Average use 340 minutes per day.   95%ile Leak 68.28 L/min.   CPAP 95% pressure 0 cm.   AHI 0.11 events per hour.         Past medical/surgical history, family history, social history, medications and allergies were reviewed.      Problem List:  Patient Active Problem List    Diagnosis Date Noted     Morbid obesity due to excess calories (H) 01/20/2017     Priority: Medium     CITLALLI (obstructive sleep apnea) 01/20/2017     Priority: Medium     Abscess of anal and rectal regions 02/19/2007     Priority: Medium        There were no vitals taken for this visit.    Impression/Plan:  The patient has optimal results with BIPAP. However, is chronically sleep deprived due sophie his schedule. Encourage proper sleep time and sleep hygiene.   Severe Sleep apnea.  Tolerating PAP well. Daytime symptoms are stable.       Misbah Floyd will follow up in about 1 year(s).   Your BMI is Body mass index is 46.68 kg/m .    What is BMI?  Body mass index (BMI) is one way to tell whether you are at a healthy  weight, overweight, or obese. It measures your weight in relation to your height.  A BMI of 18.5 to 24.9 is in the healthy range. A person with a BMI of 25 to 29.9 is considered overweight, and someone with a BMI of 30 or greater is considered obese.  Another way to find out if you are at risk for health problems caused by overweight and obesity is to measure your waist. If you are a woman and your waist is more than 35 inches, or if you are a man and your waist is more than 40 inches, your risk of disease may be higher.  More than two-thirds of American adults are considered overweight or obese. Being overweight or obese increases the risk for further weight gain.  Excess weight may lead to heart disease and diabetes. Creating and following plans for healthy eating and physical activity may help you improve your health.    Methods for maintaining or losing weight.  Weight control is part of healthy lifestyle and includes exercise, emotional health, and healthy eating habits.  Careful eating habits lifelong is the mainstay of weight control.  Though there are significant health benefits from weight loss, long-term weight loss with diet alone may be very difficult to achieve- studies show long-term success with dietary management in less than 10% of people. Attaining a healthy weight may be especially difficult to achieve in those with severe obesity. In some cases, medications, devices and surgical management might be considered.    What can you do?  If you are overweight or obese and are interested in methods for weight loss, you should discuss this with your provider. In addition, we recommend that you review healthy life styles and methods for weight loss available through the National Institutes of Health patient information sites:   http://win.niddk.nih.gov/publications/index.htm        Fifteen minutes spent with patient, all of which were spent face-to-face counseling, consulting, coordinating plan of care.       CC:  No Ref-Primary, Physician, Edy Wolff MD

## 2019-05-01 DIAGNOSIS — G47.33 OBSTRUCTIVE SLEEP APNEA: Primary | ICD-10-CM

## 2019-11-01 ENCOUNTER — TELEPHONE (OUTPATIENT)
Dept: FAMILY MEDICINE | Facility: OTHER | Age: 49
End: 2019-11-01

## 2019-11-01 ENCOUNTER — OFFICE VISIT (OUTPATIENT)
Dept: FAMILY MEDICINE | Facility: OTHER | Age: 49
End: 2019-11-01
Payer: COMMERCIAL

## 2019-11-01 VITALS
TEMPERATURE: 99.2 F | DIASTOLIC BLOOD PRESSURE: 74 MMHG | BODY MASS INDEX: 48.05 KG/M2 | RESPIRATION RATE: 18 BRPM | HEART RATE: 80 BPM | SYSTOLIC BLOOD PRESSURE: 136 MMHG | OXYGEN SATURATION: 97 % | WEIGHT: 315 LBS

## 2019-11-01 DIAGNOSIS — E66.01 MORBID OBESITY DUE TO EXCESS CALORIES (H): ICD-10-CM

## 2019-11-01 DIAGNOSIS — G47.33 OSA (OBSTRUCTIVE SLEEP APNEA): ICD-10-CM

## 2019-11-01 DIAGNOSIS — L02.419 CELLULITIS AND ABSCESS OF LEG: Primary | ICD-10-CM

## 2019-11-01 DIAGNOSIS — L03.119 CELLULITIS AND ABSCESS OF LEG: Primary | ICD-10-CM

## 2019-11-01 DIAGNOSIS — L03.115 CELLULITIS OF RIGHT LOWER EXTREMITY: Primary | ICD-10-CM

## 2019-11-01 PROCEDURE — 99213 OFFICE O/P EST LOW 20 MIN: CPT | Performed by: INTERNAL MEDICINE

## 2019-11-01 RX ORDER — LEVOFLOXACIN 500 MG/1
500 TABLET, FILM COATED ORAL DAILY
Qty: 7 TABLET | Refills: 0 | Status: SHIPPED | OUTPATIENT
Start: 2019-11-01 | End: 2020-12-02

## 2019-11-01 RX ORDER — CEPHALEXIN 500 MG/1
500 CAPSULE ORAL 4 TIMES DAILY
Qty: 28 CAPSULE | Refills: 0 | Status: SHIPPED | OUTPATIENT
Start: 2019-11-01 | End: 2019-11-04

## 2019-11-01 ASSESSMENT — PAIN SCALES - GENERAL: PAINLEVEL: MODERATE PAIN (5)

## 2019-11-01 NOTE — PROGRESS NOTES
"Subjective     Misbah Floyd is a 49 year old male who presents to clinic today for the following health issues:    HPI   Rash      Duration: Wednesday     Description  Location: lower right leg  Itching: no    Intensity:  severe    Accompanying signs and symptoms: painful, red, hot     History (similar episodes/previous evaluation): None    Precipitating or alleviating factors:  New exposures:  Was stung by a bee on right arm on Saturday. Did not get stung on legs  Recent travel: no      Therapies tried and outcome: none                      Chief Complaint         The patient is a pleasant 49-year-old gentleman who presents today with a \"rash\" on his lower right leg.  He is now had this since last Wednesday.  He notes he was previously stung by bee in his upper right arm and questions whether or not this may have been because of that.  He does have a very typical cellulitis of the lower leg without any evidence of open wound.  He does have modest swelling of the leg not seen on the contralateral side.  The leg is red and tender to touch.                         PAST, FAMILY,SOCIAL HISTORY:     Medical  History:   has no past medical history on file.     Surgical History:   has a past surgical history that includes CYSTOURETHROSCOPY (04/21/2006) and Excise mass hip (Right, 2/24/2017).     Social History:   reports that he has never smoked. He has never used smokeless tobacco. He reports that he does not drink alcohol or use drugs.     Family History:  family history is not on file.            MEDICATIONS  Current Outpatient Medications   Medication Sig Dispense Refill     cephALEXin (KEFLEX) 500 MG capsule Take 1 capsule (500 mg) by mouth 4 times daily 28 capsule 0     order for DME Equipment ordered: RESMED BiPAP Mask type: Full face  Settings: 16/11 CM H2O       EPINEPHrine (EPIPEN 2-LEONARDO) 0.3 MG/0.3ML injection Inject 0.3 mLs (0.3 mg) into the muscle once as needed for anaphylaxis (Patient not taking: Reported " on 2/23/2018) 0.6 mL 3     levofloxacin (LEVAQUIN) 500 MG tablet Take 1 tablet (500 mg) by mouth daily 7 tablet 0         --------------------------------------------------------------------------------------------------------------------                              Review of Systems       LUNGS: Pt denies: cough, excess sputum, hemoptysis, or shortness of breath.   HEART: Pt denies: chest pain, arrhythmia, syncope, tachy or bradyarrhythmia.   GI: Pt denies: nausea, vomiting, diarrhea, constipation, melena, or hematochezia.   NEURO: Pt denies: seizures, strokes, diplopia, weakness, paraesthesias, or paralysis.   SKIN: Pt denies: itching, rashes, discoloration, or specific lesions of concern. Denies recent hair loss.   PSYCH: The patient denies significant depression, anxiety, mood imbalance. Specifically denies any suicidal ideation.                                     Examination    /74 (BP Location: Left arm, Patient Position: Sitting, Cuff Size: Adult Large)   Pulse 80   Temp 99.2  F (37.3  C) (Temporal)   Resp 18   Wt 143.3 kg (316 lb)   SpO2 97%   BMI 48.05 kg/m       Constitutional: The patient appears to be in no acute distress. The patient appears to be adequately hydrated. No acute respiratory or hemodynamic distress is noted at this time.   LUNGS: clear bilaterally, airflow is brisk, no intercostal retraction or stridor is noted. No coughing is noted during visit.   HEART:  regular without rubs, clicks, gallops, or murmurs. PMI is nondisplaced. Upstrokes are brisk. S1,S2 are heard.   GI: Abdomen is soft, without rebound, guarding or tenderness. Bowel sounds are appropriate. No renal bruits are heard.   NEURO: Pt is alert and appropriate. No neurologic lateralization is noted. Cranial nerves 2-12 are intact. Peripheral sensory and motor function are grossly normal.    SKIN:  warm and dry. No erythema, or rashes are noted. No specific lesions of concern are noted.    PSYCH: The patient appears  grossly appropriate. Maintains good eye contact, does not have any jittery or atypical motion. Displays appropriate affect.                                           Decision Making    1. Cellulitis of right lower extremity  Moist heat, wraps, elevation, Levaquin 500 mg daily.    2. CITLALLI (obstructive sleep apnea)  Continue CPAP    3. Morbid obesity due to excess calories (H)  Recommend weight loss to responsible caloric restriction and exercise as possible                         FOLLOW UP   I have asked the patient to make an appointment for followup with me in 1 week        I have carefully explained the diagnosis and treatment options to the patient.  The patient has displayed an understanding of the above, and all subsequent questions were answered.      DO TORI Muniz    Portions of this note were produced using [employer]  Although every attempt at real-time proof reading has been made, occasional grammar/syntax errors may have been missed.

## 2019-11-01 NOTE — TELEPHONE ENCOUNTER
Reason for Call: Prescription change needed    Detailed comments: Pt calling and states he is allergic to cephALEXin (KEFLEX) 500 MG capsule. Please send new Rx to pharmacy as soon as possible. Pt uses [facility]    Phone Number Patient can be reached at: Home number on file [phone] (home)    Best Time:     Can we leave a detailed message on this number? YES    Call taken on 11/1/2019 at 3:41 PM by Divine Foss

## 2019-11-01 NOTE — LETTER
[facility]  [address] [facility] [address]  Phone: [phone]    November 1, 2019        Misbah Floyd  [address] [facility] [address]          To whom it may concern:    RE: Misbah Floyd    Patient was seen and treated today at our clinic and missed work 10/30-11/4/2019.  Please contact me for questions or concerns.      Sincerely,        Leandro Hollis, DO

## 2019-11-01 NOTE — TELEPHONE ENCOUNTER
Patient's medical record has been changed to reflect his recently acknowledged allergy.  I have sent a prescription for Levaquin to his pharmacy instead.    Bunny

## 2020-07-27 ENCOUNTER — TELEPHONE (OUTPATIENT)
Dept: SLEEP MEDICINE | Facility: CLINIC | Age: 50
End: 2020-07-27

## 2020-07-27 NOTE — TELEPHONE ENCOUNTER
IS WANTING TO RECEIVE A LOANER MACHINE AND SEND HIS IN FOR EVAL/REPAIR AS THE PT STATED THAT IT WAS KNOCK ONTO THE FLOOR. PT IS SCHEDULED AT THE [facility] SHOWROOM 07/28/2020 10AM.

## 2020-12-01 NOTE — PROGRESS NOTES
"Misbah Floyd is a 50 year old male who is being evaluated via a billable telephone visit.      The patient has been notified of following:     \"This telephone visit will be conducted via a call between you and your physician/provider. We have found that certain health care needs can be provided without the need for a physical exam.  This service lets us provide the care you need with a short phone conversation.  If a prescription is necessary we can send it directly to your pharmacy.  If lab work is needed we can place an order for that and you can then stop by our lab to have the test done at a later time.    Telephone visits are billed at different rates depending on your insurance coverage. During this emergency period, for some insurers they may be billed the same as an in-person visit.  Please reach out to your insurance provider with any questions.    If during the course of the call the physician/provider feels a telephone visit is not appropriate, you will not be charged for this service.\"    Patient has given verbal consent for Telephone visit?  Yes    What phone number would you like to be contacted at? [phone]    How would you like to obtain your AVS? Mail a copy    Phone call duration: 15 minutes    Michael Aguirre PA-C      Does Misbah have a CPAP/Bipap?  Yes       Type of mask:  Medium nasal / mouth mask    [facility] [place] [phone]    [email or web address]      [place] Sleep Scale: 3    Obstructive Sleep Apnea - PAP Follow-Up Visit:    No chief complaint on file.      Misbah Floyd comes in today for follow-up of their severe sleep apnea, managed with BPAP.     No specialty comments available.    Overall, he rates the experience with PAP as 9 (0 poor, 10 great). The mask is comfortable. The mask is leaking around the beard.  He is not snoring with the mask on. He is not having gasp arousals.  He is not having significant oral/nasal dryness. " The pressure is comfortable.     His PAP interface is Full Face Mask.    Bedtime is typically 9:30. Usually it takes about 5-10 minutes to fall asleep with the mask on. Wake time is typically 2:45.  Patient is using PAP therapy 6 hours per night. The patient is usually getting 6 hours of sleep per night.    He does feel rested in the morning.    Total score - [place]: 3 (12/2/2020 10:47 AM)      BEATRICE Total Score: 0      ResMed   CPAP  cmH2O 30 day usage data:  100% of days with > 4 hours of use. 0/30 days with no use.   Average use 342 minutes per day.   95%ile Leak 78.46 L/min.   AHI 0.22 events per hour.     BiPAP 16 - 11 cmH2O 30 day usage data:    100% of days with > 4 hours of use. 0/30 days with no use.   Average use 342 minutes per day.   95%ile Leak 78.46 L/min.   CPAP 95% pressure cm.   AHI 0.22 events per hour.         Past medical/surgical history, family history, social history, medications and allergies were reviewed.      Problem List:  Patient Active Problem List    Diagnosis Date Noted     Morbid obesity due to excess calories (H) 01/20/2017     Priority: Medium     CITLALLI (obstructive sleep apnea) 01/20/2017     Priority: Medium     Abscess of anal and rectal regions 02/19/2007     Priority: Medium        There were no vitals taken for this visit.    Impression/Plan:     Severe Sleep apnea. He is Tolerating PAP well. Daytime symptoms are stable.   Going very well, just needs new supplies    Misbah Floyd will follow up in about 1 year(s).     Fifteen minutes spent with patient, all of which were spent face-to-face counseling, consulting, coordinating plan of care.      CC:  No Ref-Primary, Physician,

## 2020-12-02 ENCOUNTER — VIRTUAL VISIT (OUTPATIENT)
Dept: SLEEP MEDICINE | Facility: CLINIC | Age: 50
End: 2020-12-02
Payer: COMMERCIAL

## 2020-12-02 DIAGNOSIS — G47.33 OBSTRUCTIVE SLEEP APNEA: Primary | ICD-10-CM

## 2020-12-02 PROCEDURE — 99213 OFFICE O/P EST LOW 20 MIN: CPT | Mod: TEL | Performed by: PHYSICIAN ASSISTANT

## 2021-09-07 ENCOUNTER — HOSPITAL ENCOUNTER (EMERGENCY)
Facility: CLINIC | Age: 51
Discharge: HOME | End: 2021-09-07
Attending: EMERGENCY MEDICINE | Admitting: EMERGENCY MEDICINE
Payer: COMMERCIAL

## 2021-09-07 VITALS
DIASTOLIC BLOOD PRESSURE: 98 MMHG | TEMPERATURE: 98.1 F | OXYGEN SATURATION: 98 % | SYSTOLIC BLOOD PRESSURE: 157 MMHG | HEART RATE: 74 BPM | RESPIRATION RATE: 18 BRPM

## 2021-09-07 DIAGNOSIS — T63.441A BEE STING REACTION, ACCIDENTAL OR UNINTENTIONAL, INITIAL ENCOUNTER: ICD-10-CM

## 2021-09-07 PROCEDURE — 99284 EMERGENCY DEPT VISIT MOD MDM: CPT | Performed by: EMERGENCY MEDICINE

## 2021-09-07 PROCEDURE — 99283 EMERGENCY DEPT VISIT LOW MDM: CPT | Performed by: EMERGENCY MEDICINE

## 2021-09-07 RX ORDER — PREDNISONE 20 MG/1
TABLET ORAL
Qty: 10 TABLET | Refills: 0 | Status: SHIPPED | OUTPATIENT
Start: 2021-09-07 | End: 2022-03-15

## 2021-09-07 NOTE — ED PROVIDER NOTES
History     Chief Complaint   Patient presents with     Insect Bite     HPI  Misbah Floyd is a 51 year old male who presents with swelling to his right forearm after a wasp sting exposure yesterday. Has never had a severe local reaction. No prior systemic reaction. Has had no diffuse rash. No risk for difficulties. No GI symptoms. Swelling and warmth is present over the right forearm. It is pruritic not tender.    Allergies:  Allergies   Allergen Reactions     Bees Swelling     Wasp Venom Protein Other (See Comments)     Amoxicillin Rash     Rash & cellulitis - see 3/16/17 encounter     Bactrim [Sulfamethoxazole W/Trimethoprim] Rash     Rash & cellulitis - see 3/16/17 encounter - this was prescribed last of 3 abx after amoxicillin, cephalexin - rash didn't get better - only after Benadryl did improve     Keflex [Cephalexin] Rash     Rash & cellulitis - see 3/16/17 encounter       Problem List:    Patient Active Problem List    Diagnosis Date Noted     Morbid obesity due to excess calories (H) 01/20/2017     Priority: Medium     CITLALLI (obstructive sleep apnea) 01/20/2017     Priority: Medium     Abscess of anal and rectal regions 02/19/2007     Priority: Medium        Past Medical History:    History reviewed. No pertinent past medical history.    Past Surgical History:    Past Surgical History:   Procedure Laterality Date     EXCISE MASS HIP Right 2/24/2017    Procedure: EXCISE MASS HIP;  Surgeon: Solis Lopes MD;  Location:  OR      CYSTOURETHROSCOPY  04/21/2006    Stone basket extraction. Left double-J stent placemnt.       Family History:    History reviewed. No pertinent family history.    Social History:  Marital Status:   [2]  Social History     Tobacco Use     Smoking status: Never Smoker     Smokeless tobacco: Never Used   Substance Use Topics     Alcohol use: No     Alcohol/week: 0.0 standard drinks     Drug use: No        Medications:    EPINEPHrine (EPIPEN 2-LEONARDO) 0.3 MG/0.3ML  injection  order for DME          Review of Systems   All other systems reviewed and are negative.      Physical Exam   BP: (!) 157/98  Pulse: 74  Temp: 98.1  F (36.7  C)  Resp: 18  SpO2: 98 %      Physical Exam  Vitals and nursing note reviewed.   Constitutional:       Appearance: He is not ill-appearing.   HENT:      Head: Normocephalic.      Nose: Nose normal.   Eyes:      Conjunctiva/sclera: Conjunctivae normal.   Cardiovascular:      Rate and Rhythm: Normal rate and regular rhythm.   Pulmonary:      Effort: Pulmonary effort is normal.      Breath sounds: Normal breath sounds. No stridor. No wheezing.   Skin:     General: Skin is warm.      Capillary Refill: Capillary refill takes less than 2 seconds.      Findings: No rash.      Comments: Local erythema soft tissue swelling extensor side of right forearm. Follows an area of approximately 10 x 20 cm.   Neurological:      General: No focal deficit present.      Mental Status: He is alert and oriented to person, place, and time.   Psychiatric:         Mood and Affect: Mood normal.         Behavior: Behavior normal.         ED Course        Procedures             Assessments & Plan (with Medical Decision Making)  Local reaction from a wasp sting occurring yesterday afternoon. Consistent cellulitis. Consistent with ongoing mast cell degranulation. I recommended diphenhydramine 50 mg every 6-8 hours and prednisone 40 mg daily x5 days.     I have reviewed the nursing notes.    I have reviewed the findings, diagnosis, plan and need for follow up with the patient.      New Prescriptions    No medications on file       Final diagnoses:   Bee sting reaction, accidental or unintentional, initial encounter       9/7/2021   [facility] EMERGENCY DEPT     James Maldonado, DO  09/07/21 [id number]

## 2021-09-07 NOTE — DISCHARGE INSTRUCTIONS
Which you are experiencing is a local reaction from a wasp sting exposure. Fortunately you have developed no systemic symptoms. I recommend antihistamine therapy with Benadryl 50 mg every 6 hours for 24 to 36 hours. If it is too sedating initial dose should be Benadryl 50 mg in the neck repeated at a time. In between you can take less sedating antihistamine such as Zyrtec 10 mg twice daily. Given the length of time that you have had allergy mediated swelling recommend also starting prednisone 40 mg daily for 5 days. Take with food.

## 2021-09-07 NOTE — ED TRIAGE NOTES
Pt presents with bee sting swelling from yesterday on right forearm. Pt states it was fine yesterday, a little swollen, now red and swollen.

## 2021-10-24 NOTE — PATIENT INSTRUCTIONS

## 2021-11-05 ENCOUNTER — TELEPHONE (OUTPATIENT)
Dept: INTERNAL MEDICINE | Facility: CLINIC | Age: 51
End: 2021-11-05
Payer: COMMERCIAL

## 2021-11-05 NOTE — TELEPHONE ENCOUNTER
"I have not done a contin test on this patient.    I have no proof as to whether he does or does not use tobacco.    My letter will be just as meaningless as his employer writing the letter.    If he would like to set up a time to be tested here, we can do that.    I am unable to simply write \"a letter\" to this effect.      It is all about credibility.      Bunny  "

## 2021-11-05 NOTE — TELEPHONE ENCOUNTER
Patient stating his employer usually has them complete a saliva test yearly, which will be on 11/8 and he is off on vacation. His employer is stating he will need to get a note from his provider that states he does not smoke or use smokeless tobacco products. He needs this to get a discount on his insurance through his work. Patient would like to pick this up at the clinic registration desk. Please advise when this has been completed. Tatiana Ayala LPN

## 2021-11-11 NOTE — TELEPHONE ENCOUNTER
Patient is called and informed of this message.  Patient understands and agrees to this plan.  Closing this encounter.    Ada Jefferson RN

## 2021-11-11 NOTE — TELEPHONE ENCOUNTER
2nd attempt Left message for patient to return call.  Will close encounter until patient calls back.  Shakira Shafer MA 11/11/2021

## 2022-03-11 ENCOUNTER — TELEPHONE (OUTPATIENT)
Dept: INTERNAL MEDICINE | Facility: CLINIC | Age: 52
End: 2022-03-11
Payer: COMMERCIAL

## 2022-03-11 NOTE — TELEPHONE ENCOUNTER
Patient called requesting an appointment due to experiencing some dizziness at night when getting up from bed. Discussed getting up slowly when moving positions to prevent falls and fainting.   Appointment made with PCP for nest week.      Bethany Whiting RN

## 2022-03-15 ENCOUNTER — OFFICE VISIT (OUTPATIENT)
Dept: INTERNAL MEDICINE | Facility: CLINIC | Age: 52
End: 2022-03-15
Payer: COMMERCIAL

## 2022-03-15 VITALS
HEART RATE: 84 BPM | OXYGEN SATURATION: 97 % | WEIGHT: 315 LBS | RESPIRATION RATE: 22 BRPM | DIASTOLIC BLOOD PRESSURE: 80 MMHG | BODY MASS INDEX: 47.74 KG/M2 | SYSTOLIC BLOOD PRESSURE: 136 MMHG | HEIGHT: 68 IN | TEMPERATURE: 98.6 F

## 2022-03-15 DIAGNOSIS — Z12.11 SCREEN FOR COLON CANCER: ICD-10-CM

## 2022-03-15 DIAGNOSIS — Z11.4 SCREENING FOR HIV (HUMAN IMMUNODEFICIENCY VIRUS): ICD-10-CM

## 2022-03-15 DIAGNOSIS — Z13.220 SCREENING FOR HYPERLIPIDEMIA: ICD-10-CM

## 2022-03-15 DIAGNOSIS — H69.93 DYSFUNCTION OF BOTH EUSTACHIAN TUBES: Primary | ICD-10-CM

## 2022-03-15 DIAGNOSIS — Z11.59 NEED FOR HEPATITIS C SCREENING TEST: ICD-10-CM

## 2022-03-15 PROCEDURE — 99213 OFFICE O/P EST LOW 20 MIN: CPT | Performed by: INTERNAL MEDICINE

## 2022-03-15 RX ORDER — PREDNISONE 20 MG/1
40 TABLET ORAL DAILY
Qty: 10 TABLET | Refills: 0 | Status: SHIPPED | OUTPATIENT
Start: 2022-03-15 | End: 2022-06-29

## 2022-03-15 RX ORDER — LORATADINE 10 MG/1
10 TABLET ORAL DAILY
Qty: 30 TABLET | Refills: 0 | Status: SHIPPED | OUTPATIENT
Start: 2022-03-15 | End: 2022-06-29

## 2022-03-15 RX ORDER — MECLIZINE HYDROCHLORIDE 25 MG/1
25 TABLET ORAL 3 TIMES DAILY PRN
Qty: 20 TABLET | Refills: 0 | Status: SHIPPED | OUTPATIENT
Start: 2022-03-15 | End: 2022-06-29

## 2022-03-15 ASSESSMENT — PAIN SCALES - GENERAL: PAINLEVEL: NO PAIN (0)

## 2022-03-15 NOTE — PROGRESS NOTES
"      Ernesto Luna is a 51 year old who presents for the following health issues     History of Present Illness       Migraines:   Since the patient's last clinic visit, headaches are: no change  The patient is getting headaches:  2  He is able to do normal daily activities when he has a migraine.  The patient is taking the following rescue/relief medications:  No rescue/relief medications   Patient states \"I get no relief\" from the rescue/relief medications.   The patient is taking the following medications to prevent migraines:  No medications to prevent migraines  In the past 4 weeks, the patient has gone to an Urgent Care or Emergency Room 0 times times due to headaches.    Reason for visit:  Dizzy  Symptom intensity:  Mild    He eats 0-1 servings of fruits and vegetables daily.He consumes 3 sweetened beverage(s) daily.He exercises with enough effort to increase his heart rate 9 or less minutes per day.  He exercises with enough effort to increase his heart rate 3 or less days per week.   He is taking medications regularly.     EMR reviewed including:             Complaint, History of Chief Complaint, Corresponding Review of Systems, and Complaint Specific Physical Examination.    #1   Ear pressure with headaches and vertigo.  Worse when he changes position of his head  Head pressure is primarily frontal and maxillary.  No significant tinnitus.  Hearing somewhat muffled.  Over-the-counter medications have not been helpful.        Exam:   ENT: Pharynx is non-erythemous, moderate/clear PND, there is moderate nasal congestion, TM's not red but markedly retracted with fluid levels present.  Hearing muffled bilaterally. No carotid bruits are heard. No JVD seen. Thyroid is not nodular or enlarged.   LUNGS: clear bilaterally, airflow is brisk, no intercostal retraction or stridor is noted. No coughing is noted during visit.   HEART:  regular without rubs, clicks, gallops, or murmurs. PMI is nondisplaced. Upstrokes " "are brisk. S1,S2 are heard.   NEURO: Pt is alert and appropriate. No neurologic lateralization is noted. Cranial nerves 2-12 are intact. Peripheral sensory and motor function are grossly normal.         Patient has been interviewed, applicable history and applied review of systems have been performed.    Vital Signs:   /80   Pulse 84   Temp 98.6  F (37  C) (Temporal)   Resp 22   Ht 1.722 m (5' 7.8\")   Wt (!) 155.1 kg (342 lb)   SpO2 97%   BMI 52.31 kg/m        Decision Making    Problem and Complexity     1. Dysfunction of both eustachian tubes  Placed on a short course of prednisone as well as meclizine as needed for vertigo and an antihistamine throughout the spring season  - predniSONE (DELTASONE) 20 MG tablet; Take 2 tablets (40 mg) by mouth daily  Dispense: 10 tablet; Refill: 0  - meclizine (ANTIVERT) 25 MG tablet; Take 1 tablet (25 mg) by mouth 3 times daily as needed for dizziness  Dispense: 20 tablet; Refill: 0  - loratadine (CLARITIN) 10 MG tablet; Take 1 tablet (10 mg) by mouth daily  Dispense: 30 tablet; Refill: 0    2. Screen for colon cancer  Recommend colonoscopy    3. Screening for hyperlipidemia  Recommend fasting lipid levels    4. Screening for HIV (human immunodeficiency virus)  Recommend HIV screening at that time    5. Need for hepatitis C screening test  Recommend hepatitis C screening at that time                              FOLLOW UP   I have asked the patient to make an appointment for followup with me in 2 weeks if not markedly improved otherwise at his earliest convenience for physical examination in the proper screening lab work                           FOLLOW UP   I have asked the patient to make an appointment for followup with me in 2 weeks        I have carefully explained the diagnosis and treatment options to the patient.  The patient has displayed an understanding of the above, and all subsequent questions were answered.      Leandro Hollis, DO [facility]    Portions " of this note were produced using [employer]  Although every attempt at real-time proof reading has been made, occasional grammar/syntax errors may have been missed.

## 2022-04-20 ENCOUNTER — DOCUMENTATION ONLY (OUTPATIENT)
Dept: SLEEP MEDICINE | Facility: CLINIC | Age: 52
End: 2022-04-20
Payer: COMMERCIAL

## 2022-04-20 ENCOUNTER — TELEPHONE (OUTPATIENT)
Dept: SLEEP MEDICINE | Facility: CLINIC | Age: 52
End: 2022-04-20
Payer: COMMERCIAL

## 2022-04-20 DIAGNOSIS — G47.33 OSA (OBSTRUCTIVE SLEEP APNEA): Primary | ICD-10-CM

## 2022-05-02 DIAGNOSIS — G47.33 OSA (OBSTRUCTIVE SLEEP APNEA): Primary | ICD-10-CM

## 2022-05-23 NOTE — PROGRESS NOTES
Does Misbah have a CPAP/Bipap?  Yes         Type of mask: full face     [facility]: [place] [phone]    [email or web address]     [place] Sleep Scale: 8    Obstructive Sleep Apnea - PAP Follow-Up Visit:    Chief Complaint   Patient presents with     CPAP Follow Up       Misbah Floyd comes in today for follow-up of their severe sleep apnea, managed with CPAP.     No specialty comments available.    Overall, he rates the experience with BIPAP as 9 (0 poor, 10 great). The mask is comfortable.    The mask is leaking at his cheeks due to facial hair.  He is snoring with the mask on. He is not having gasp arousals.  He is not having significant oral/nasal dryness. The pressure is comfortable.     His PAP interface is Full Face Mask.    Bedtime is typically 10. Usually it takes about <5 minutes to fall asleep with the mask on. Wake time is typically 2:45 AM.  Patient is using PAP therapy 6 hours per night. The patient is usually getting 5-9 hours of sleep per night.    He does feel rested in the morning.    [place] Sleepiness Scale: 8/24      No data recorded    ResMed     BIPAP 16 - 11 cmH2O 30 day usage data:    98% of days with > 4 hours of use. 0/30 days with no use.   Average use 6 hours 6 minutes per day.   95%ile Leak 89.3 L/min.   CPAP 95% pressure  cm.   AHI 0.1 events per hour.             Past medical/surgical history, family history, social history, medications and allergies were reviewed.      Problem List:  Patient Active Problem List    Diagnosis Date Noted     Morbid obesity due to excess calories (H) 01/20/2017     Priority: Medium     CITLALLI (obstructive sleep apnea) 01/20/2017     Priority: Medium     Abscess of anal and rectal regions 02/19/2007     Priority: Medium        There were no vitals taken for this visit.    Impression/Plan:    Severe Sleep apnea. He is Tolerating PAP well. Daytime symptoms are stable. He has been having problems with the machine  not working, needed new power cord, humidifier. Orders placed for a new machine, supplies.       Misbah KENAN Floyd will follow up in about 3 months.     Fifteen minutes spent with patient, all of which were spent face-to-face counseling, consulting, coordinating plan of care.      CC:  Leandro Hollis,

## 2022-05-24 ENCOUNTER — OFFICE VISIT (OUTPATIENT)
Dept: SLEEP MEDICINE | Facility: CLINIC | Age: 52
End: 2022-05-24
Payer: COMMERCIAL

## 2022-05-24 VITALS
RESPIRATION RATE: 22 BRPM | SYSTOLIC BLOOD PRESSURE: 142 MMHG | DIASTOLIC BLOOD PRESSURE: 84 MMHG | TEMPERATURE: 99 F | WEIGHT: 315 LBS | HEIGHT: 68 IN | BODY MASS INDEX: 47.74 KG/M2

## 2022-05-24 DIAGNOSIS — G47.33 OSA (OBSTRUCTIVE SLEEP APNEA): Primary | ICD-10-CM

## 2022-05-24 PROCEDURE — 99213 OFFICE O/P EST LOW 20 MIN: CPT | Performed by: PHYSICIAN ASSISTANT

## 2022-06-28 ENCOUNTER — NURSE TRIAGE (OUTPATIENT)
Dept: INTERNAL MEDICINE | Facility: CLINIC | Age: 52
End: 2022-06-28

## 2022-06-28 NOTE — TELEPHONE ENCOUNTER
Patient calling with concerns for right knee pain. Patient states a burning sensation in his knee since Thursday 6/23/22. Denies any swelling or redness to the knee. No known injury. Patient reports with movement pain is severe - especially with bending and going up stairs, driving, and at work. Pain at rest is mild.    He took 3 ibuprofen yesterday that helped for a little while but once he would move a certain way the pain would intensify.     Per protocol, patient should be seen in clinic within 3 days. Routing to PCP and triage team at clinic to assist with scheduling.     Abimbola KRISHNAMURTHYN, RN     Reason for Disposition    MODERATE pain (e.g., symptoms interfere with work or school, limping) and present > 3 days    Additional Information    Negative: Sounds like a life-threatening emergency to the triager    Negative: Swollen knee joint and fever    Negative: Thigh or calf pain and only 1 side and present > 1 hour    Negative: Thigh or calf swelling and only 1 side    Negative: Patient sounds very sick or weak to the triager    Negative: Can't move swollen joint at all    Negative: SEVERE pain (e.g., excruciating, unable to walk)    Negative: Very swollen joint    Negative: Painful rash with multiple small blisters grouped together (i.e., dermatomal distribution or 'band' or 'stripe')    Negative: Looks like a boil, infected sore, deep ulcer, or other infected rash (spreading redness, pus)    Protocols used: KNEE PAIN-A-OH

## 2022-06-29 ENCOUNTER — OFFICE VISIT (OUTPATIENT)
Dept: INTERNAL MEDICINE | Facility: CLINIC | Age: 52
End: 2022-06-29
Payer: COMMERCIAL

## 2022-06-29 VITALS
BODY MASS INDEX: 49.44 KG/M2 | SYSTOLIC BLOOD PRESSURE: 133 MMHG | HEART RATE: 98 BPM | TEMPERATURE: 97.7 F | HEIGHT: 67 IN | WEIGHT: 315 LBS | OXYGEN SATURATION: 97 % | DIASTOLIC BLOOD PRESSURE: 88 MMHG

## 2022-06-29 DIAGNOSIS — M76.899 QUADRICEPS TENDONITIS: Primary | ICD-10-CM

## 2022-06-29 PROCEDURE — 99213 OFFICE O/P EST LOW 20 MIN: CPT | Performed by: INTERNAL MEDICINE

## 2022-06-29 RX ORDER — PREDNISONE 20 MG/1
40 TABLET ORAL DAILY
Qty: 10 TABLET | Refills: 0 | Status: SHIPPED | OUTPATIENT
Start: 2022-06-29 | End: 2022-12-06

## 2022-06-29 NOTE — PROGRESS NOTES
"      Ernesto Luna is a 51 year old, presenting for the following health issues:  Knee Pain (Right knee)      Knee Pain    History of Present Illness       Reason for visit:  Right knee pain.  Pain below right knee, not sure if he pulled a muscle.  Increased pain going upstairs  Symptom onset:  1-2 weeks ago  Symptom intensity:  Severe  Symptom progression:  Staying the same  What makes it worse:  Going up and down steps.  Certain movements  What makes it better:  Sitting as long as his right leg is in a certain position    He eats 0-1 servings of fruits and vegetables daily.He consumes 3 sweetened beverage(s) daily.He exercises with enough effort to increase his heart rate 60 or more minutes per day.  He exercises with enough effort to increase his heart rate 5 days per week.   He is taking medications regularly.        EMR reviewed including:             Complaint, History of Chief Complaint, Corresponding Review of Systems, and Complaint Specific Physical Examination.    #1   Right leg pain.  Started about 2 weeks ago.  Was kneeling at work.  No falls, or trauma.  Now extension of the leg is uncomfortable.  Pain is below the patella at the Pez anserine.  No redness or inflammation noted externally.          Exam:   NEURO: Pt is alert and appropriate. No neurologic lateralization is noted. Cranial nerves 2-12 are intact. Peripheral sensory and motor function are grossly normal.    MS: Minimal crepitance is noted in the right knee. No deformity is present. Muscle strength is appropriate and equal bilaterally. No acute joint erythema or swelling is present.  Knee examination is as above.          Patient has been interviewed, applicable history and applied review of systems have been performed.    Vital Signs:   /88 (BP Location: Left arm)   Pulse 98   Temp 97.7  F (36.5  C) (Temporal)   Ht 1.702 m (5' 7\")   Wt (!) 152.5 kg (336 lb 3.2 oz)   SpO2 97%   BMI 52.66 kg/m        Decision Making    Problem " and Complexity     1. Quadriceps tendonitis  Prednisone, moist heat, rest.  - predniSONE (DELTASONE) 20 MG tablet; Take 2 tablets (40 mg) by mouth daily  Dispense: 10 tablet; Refill: 0                                FOLLOW UP   I have asked the patient to make an appointment for followup with me in 1 week if not markedly improved        I have carefully explained the diagnosis and treatment options to the patient.  The patient has displayed an understanding of the above, and all subsequent questions were answered.      DO TORI Muniz    Portions of this note were produced using [employer]  Although every attempt at real-time proof reading has been made, occasional grammar/syntax errors may have been missed.

## 2022-10-25 ENCOUNTER — DOCUMENTATION ONLY (OUTPATIENT)
Dept: SLEEP MEDICINE | Facility: CLINIC | Age: 52
End: 2022-10-25
Payer: COMMERCIAL

## 2022-10-25 DIAGNOSIS — G47.33 OBSTRUCTIVE SLEEP APNEA (ADULT) (PEDIATRIC): Primary | ICD-10-CM

## 2022-10-25 NOTE — PROGRESS NOTES
Patient was offered choice of vendor and chose [facility].  Patient Misbah Floyd was set up at [facility] on October 25, 2022. Patient received a Resmed Aircurve 10 (CARD TO CLOUD DEVICE-NO MODEM) Pressures were set at EPAP:11, IPAP:16.  Patient s ramp is 5 cm H2O for 30 min and FLEX/EPR is 2.  Patient received  heated tubing and heated humidifier.  Patient does not need to meet compliance.  Stephania Dutta

## 2022-10-31 ENCOUNTER — HOSPITAL ENCOUNTER (EMERGENCY)
Facility: CLINIC | Age: 52
Discharge: HOME | End: 2022-10-31
Attending: EMERGENCY MEDICINE | Admitting: EMERGENCY MEDICINE
Payer: COMMERCIAL

## 2022-10-31 ENCOUNTER — APPOINTMENT (OUTPATIENT)
Dept: GENERAL RADIOLOGY | Facility: CLINIC | Age: 52
End: 2022-10-31
Attending: EMERGENCY MEDICINE
Payer: COMMERCIAL

## 2022-10-31 VITALS
HEART RATE: 80 BPM | DIASTOLIC BLOOD PRESSURE: 94 MMHG | RESPIRATION RATE: 16 BRPM | TEMPERATURE: 98.6 F | OXYGEN SATURATION: 96 % | WEIGHT: 315 LBS | BODY MASS INDEX: 52.16 KG/M2 | SYSTOLIC BLOOD PRESSURE: 183 MMHG

## 2022-10-31 DIAGNOSIS — M23.91 INTERNAL DERANGEMENT OF KNEE, RIGHT: ICD-10-CM

## 2022-10-31 DIAGNOSIS — M25.561 ACUTE PAIN OF RIGHT KNEE: ICD-10-CM

## 2022-10-31 PROCEDURE — 99283 EMERGENCY DEPT VISIT LOW MDM: CPT

## 2022-10-31 PROCEDURE — 73562 X-RAY EXAM OF KNEE 3: CPT | Mod: RT

## 2022-10-31 PROCEDURE — 99284 EMERGENCY DEPT VISIT MOD MDM: CPT | Performed by: EMERGENCY MEDICINE

## 2022-10-31 RX ORDER — OXYCODONE AND ACETAMINOPHEN 5; 325 MG/1; MG/1
1 TABLET ORAL EVERY 6 HOURS PRN
Qty: 12 TABLET | Refills: 0 | Status: SHIPPED | OUTPATIENT
Start: 2022-10-31 | End: 2022-11-03

## 2022-10-31 NOTE — ED PROVIDER NOTES
"  History     Chief Complaint   Patient presents with     Knee Pain     HPI  Misbah Floyd is a 52 year old male who presents with worsening medial right knee pain.  Last Thursday while walking at work he developed knee pain which has progressively worsened.  When he entered the emergency room today he felt like \"someone hit his knee with a hammer\" and now he has a hard time walking.  The knee does not seem swollen.  Had similar knee pain and was seen in the clinic.  He was given prednisone for suspected quadricep tendinitis and over a period of time did have improvement.  He denies history of gout.  No history of DVT or PE.  Denies significant leg pain or swelling.  No back, hip, ankle or foot pain.  No paresthesias.  Symptoms seem to be worse when he wears his work shoes.  States when he is wearing the shoes he recently purchased pain is not as bad.  He has not taken any for pain prior to arrival today.    Allergies:  Allergies   Allergen Reactions     Bees Swelling     Wasp Venom Protein Other (See Comments)     Amoxicillin Rash     Rash & cellulitis - see 3/16/17 encounter     Bactrim [Sulfamethoxazole W/Trimethoprim] Rash     Rash & cellulitis - see 3/16/17 encounter - this was prescribed last of 3 abx after amoxicillin, cephalexin - rash didn't get better - only after Benadryl did improve     Keflex [Cephalexin] Rash     Rash & cellulitis - see 3/16/17 encounter       Problem List:    Patient Active Problem List    Diagnosis Date Noted     Morbid obesity due to excess calories (H) 01/20/2017     Priority: Medium     CITLALLI (obstructive sleep apnea) 01/20/2017     Priority: Medium     Abscess of anal and rectal regions 02/19/2007     Priority: Medium        Past Medical History:    History reviewed. No pertinent past medical history.    Past Surgical History:    Past Surgical History:   Procedure Laterality Date     EXCISE MASS HIP Right 2/24/2017    Procedure: EXCISE MASS HIP;  Surgeon: Solis Lopes MD;  " Location:  OR      CYSTOURETHROSCOPY  04/21/2006    Stone basket extraction. Left double-J stent placemnt.       Family History:    No family history on file.    Social History:  Marital Status:   [2]  Social History     Tobacco Use     Smoking status: Never     Smokeless tobacco: Never   Vaping Use     Vaping Use: Never used   Substance Use Topics     Alcohol use: No     Alcohol/week: 0.0 standard drinks     Drug use: No        Medications:    oxyCODONE-acetaminophen (PERCOCET) 5-325 MG tablet  EPINEPHrine (EPIPEN 2-LEONARDO) 0.3 MG/0.3ML injection  predniSONE (DELTASONE) 20 MG tablet          Review of Systems all other systems are reviewed and are negative.    Physical Exam   BP: (!) 183/94  Pulse: 80  Temp: 98.6  F (37  C)  Resp: 16  Weight: (!) 151 kg (333 lb)  SpO2: 96 %      Physical Exam General alert cooperative male in mild to moderate distress.  Examination of his back reveals no localizing tenderness.  Hip is nontender.  The knee does have tenderness in the medial joint line.  No joint effusion.  With straight leg raising does not have radicular symptoms.  Unable to fully flex and extend the knee without limitation and is mild increase in his pain.  He does have subtle swelling of both ankles with some stasis changes noted in the left lower leg.  Intact pulses and sensation.  Intact strength.  With palpation over the medial collateral ligament he has tenderness but with stressing does not have laxity.  Patella is freely mobile and nontender.  There is no redness or swelling suggesting gout or cellulitis.    ED Course                 Procedures              Critical Care time:  none               Results for orders placed or performed during the hospital encounter of 10/31/22 (from the past 24 hour(s))   XR Knee Right 3 Views    Narrative    KNEE THREE VIEWS RIGHT  10/31/2022 11:48 AM     HISTORY: Trauma, pain  COMPARISON: None.      Impression    IMPRESSION: No acute fracture or malalignment. There  "is normal joint  spacing. Minimal knee joint effusion.    ESPERANZA CORTEZ MD         SYSTEM ID:  [id number]       Medications - No data to display    Assessments & Plan (with Medical Decision Making)   Misbah Floyd is a 52 year old male who presents with worsening medial right knee pain.  Last Thursday while walking at work he developed knee pain which has progressively worsened.  When he entered the emergency room today he felt like \"someone hit his knee with a hammer\" and now he has a hard time walking.  The knee does not seem swollen.  Had similar knee pain and was seen in the clinic.  He was given prednisone for suspected quadricep tendinitis and over a period of time did have improvement.  He denies history of gout.  No history of DVT or PE.  Denies significant leg pain or swelling.  No back, hip, ankle or foot pain.  No paresthesias.  Symptoms seem to be worse when he wears his work shoes.  States when he is wearing the shoes he recently purchased pain is not as bad.  He has not taken any for pain prior to arrival today.  On exam patient does not have obvious swelling of the knee but does have tenderness over the medial joint line and collateral ligament.  However stressing does not have laxity of the collateral ligament.  He had good endpoints for ACL and PCL.  No radicular leg pain with straight leg raising.  X-ray does not show any acute fracture but does have a joint effusion.  Suspect internal derangement possible meniscal injury medially.  Due to patient's leg size unable to fit him with a knee immobilizer.  Ace wrap and crutches were provided.  He can take ibuprofen for pain or Percocet for severe pain.  Referral to orthopedics has been made for reevaluation possible injection or even MRI.  I have reviewed the nursing notes.    I have reviewed the findings, diagnosis, plan and need for follow up with the patient.       New Prescriptions    OXYCODONE-ACETAMINOPHEN (PERCOCET) 5-325 MG TABLET    Take " 1 tablet by mouth every 6 hours as needed for pain       Final diagnoses:   Acute pain of right knee   Internal derangement of knee, right       10/31/2022   [facility] EMERGENCY DEPT     Craig Araya MD  10/31/22 [id number]

## 2022-10-31 NOTE — ED TRIAGE NOTES
Pt comes w/ knee pain that started last Wednesday that has gotten progressively worse. PT was not able to tolerate walking at work today.

## 2022-10-31 NOTE — DISCHARGE INSTRUCTIONS
Your exam is consistent with a injury to the medial collateral ligament or possible meniscus in your knee.  You do have an effusion which indicates there may be some blood or swelling in the knee joint causing your pain.  The orthopedic department should be contacting you next day or so to make a follow-up appointment.  In the meantime Ace wrap and crutches with partial weightbearing.  Ice for swelling.  Ibuprofen or Percocet for pain.

## 2022-11-02 ENCOUNTER — OFFICE VISIT (OUTPATIENT)
Dept: ORTHOPEDICS | Facility: CLINIC | Age: 52
End: 2022-11-02
Payer: COMMERCIAL

## 2022-11-02 VITALS
SYSTOLIC BLOOD PRESSURE: 184 MMHG | WEIGHT: 315 LBS | HEIGHT: 67 IN | DIASTOLIC BLOOD PRESSURE: 100 MMHG | BODY MASS INDEX: 49.44 KG/M2 | HEART RATE: 69 BPM

## 2022-11-02 DIAGNOSIS — R29.898 DIFFICULTY BEARING WEIGHT ON RIGHT LOWER EXTREMITY: ICD-10-CM

## 2022-11-02 DIAGNOSIS — M25.461 KNEE EFFUSION, RIGHT: ICD-10-CM

## 2022-11-02 DIAGNOSIS — M23.91 INTERNAL DERANGEMENT OF KNEE, RIGHT: ICD-10-CM

## 2022-11-02 DIAGNOSIS — M25.561 ACUTE PAIN OF RIGHT KNEE: Primary | ICD-10-CM

## 2022-11-02 DIAGNOSIS — M25.60 LIMITED JOINT RANGE OF MOTION: ICD-10-CM

## 2022-11-02 PROCEDURE — 99203 OFFICE O/P NEW LOW 30 MIN: CPT | Mod: 25 | Performed by: FAMILY MEDICINE

## 2022-11-02 PROCEDURE — 20611 DRAIN/INJ JOINT/BURSA W/US: CPT | Mod: RT | Performed by: FAMILY MEDICINE

## 2022-11-02 RX ORDER — ROPIVACAINE HYDROCHLORIDE 5 MG/ML
3 INJECTION, SOLUTION EPIDURAL; INFILTRATION; PERINEURAL
Status: DISCONTINUED | OUTPATIENT
Start: 2022-11-02 | End: 2023-02-01

## 2022-11-02 RX ORDER — TRIAMCINOLONE ACETONIDE 40 MG/ML
40 INJECTION, SUSPENSION INTRA-ARTICULAR; INTRAMUSCULAR
Status: DISCONTINUED | OUTPATIENT
Start: 2022-11-02 | End: 2023-02-01

## 2022-11-02 RX ADMIN — ROPIVACAINE HYDROCHLORIDE 3 ML: 5 INJECTION, SOLUTION EPIDURAL; INFILTRATION; PERINEURAL at 09:53

## 2022-11-02 RX ADMIN — TRIAMCINOLONE ACETONIDE 40 MG: 40 INJECTION, SUSPENSION INTRA-ARTICULAR; INTRAMUSCULAR at 09:53

## 2022-11-02 NOTE — LETTER
"    2022         RE: Misbah Floyd  [address] [facility] [address]        Dear Colleague,    Thank you for referring your patient, Misbah Floyd, to the [facility]. Please see a copy of my visit note below.    Misbah Floyd  :  1970  DOS: 2022  MRN: [id number]    Sports Medicine Clinic Visit    PCP: Leandro Hollis    Misbah Floyd is a 52 year old male who is seen in consultation at the request of  Craig Araya M.D. presenting with right knee pain.    Injury: Patient describes injury as walking at work.  10/27/22, 6  day(s).  Patient presented to ED 10/31/22, progressively woresning. Pain located over right medial knee pain, nonradiating.  Additional Features:  Positive: occasional clicking.  Symptoms are better with rest.  Symptoms are worse with: walking, stairs, weight bearing, twisting.  Other evaluation and/or treatments so far consists of: crutches.  Recent imaging completed: X-rays completed 10/31/22.  Prior History of related problems: yes- Saw PCP 2022 and was prescribed prednisone for quadriceps tendinitis.     Social History: currently employed as sheet metal shop-walking    Review of Systems  Musculoskeletal: as above  Remainder of review of systems is negative including constitutional, CV, pulmonary, GI, Skin and Neurologic except as noted in HPI or medical history.    No past medical history on file.  Past Surgical History:   Procedure Laterality Date     EXCISE MASS HIP Right 2017    Procedure: EXCISE MASS HIP;  Surgeon: Solis Lopes MD;  Location:  OR      CYSTOURETHROSCOPY  2006    Stone basket extraction. Left double-J stent placemnt.     No family history on file.    Objective  BP (!) 184/100   Pulse 69   Ht 1.702 m (5' 7\")   Wt (!) 151 kg (333 lb)   BMI 52.16 kg/m        General: healthy, alert and in no distress, obese      HEENT: no scleral icterus or conjunctival erythema     Skin: no " suspicious lesions or rash. No jaundice.     CV: regular rhythm by palpation, 2+ distal pulses, L>R pretibial and pedal edema, signs of early venous insufficiency      Resp: normal respiratory effort without conversational dyspnea     Psych: normal mood and affect      Gait: nonantalgic, appropriate coordination and balance     Neuro: normal light touch sensory exam of the extremities. Motor strength as noted below     Right Knee exam    ROM:        Flexion lacks 30 degrees       Extension lacks 10 deg degrees       Range of motion limited by pain, stiffness    Inspection:       no visible ecchymosis       effusion noted moderate right    Skin:       no visible deformities       well perfused       capillary refill brisk    Patellar Motion:        Normal patellar tracking noted through range of motion       Crepitus noted in the patellofemoral joint    Tender:        lateral patellar border mild       medial joint linefocal    Non Tender:         remainder of knee area        medial patellar border        lateral joint line        along MCL        distal IT Band        infrapatellar tendon        tibial tubercle       pes anserine bursa    Special Tests:        Painful medial Reynaldo       neg (-) Lachman       neg (-) anterior drawer       neg (-) posterior drawer       neg (-) varus at 0 deg and 30 deg       neg (-) valgus at 0 deg and 30 deg       mild pain with forced extension    Evaluation of ipsilateral kinetic chain       normal strength with hip extension and abduction      Radiology    KNEE THREE VIEWS RIGHT  10/31/2022 11:48 AM      HISTORY: Trauma, pain  COMPARISON: None.                                                                      IMPRESSION: No acute fracture or malalignment. There is normal joint  spacing. Minimal knee joint effusion.     ESPERANZA CORTEZ MD     Assessment:  1. Acute pain of right knee    2. Knee effusion, right    3. Difficulty bearing weight on right lower extremity    4.  Limited joint range of motion        Plan:  Discussed the assessment with the patient.  Follow up: 2-3 weeks if no significant benefit from injection today  Subacute right knee pain, no significant recent or remote injury  Suspect some measure of underlying DJD although imaging with XR fairly reassuring, but was not WB images  Consider advanced imaging for worsening sx or sx that do not improve, does have hx and exam suspicious for meniscus tear although not having any mechanical or instability sx  XR images independently visualized and reviewed with patient today in clinic  Oral Tylenol and topical Voltaren gel reviewed as safe OTC options, reviewed safe dosing strategies  Advised f/u with PCP as planned to review blood pressure, early venous insufficiency, overall health  Low impact activity strategies reviewed  US guided CSI with aspiration today, reviewed relative risks, limitations and potential benefits  Expectations and goals of CSI reviewed  Often 2-3 days for steroid effect, and can take up to two weeks for maximum effect  We discussed modified progressive pain-free activity as tolerated  Do not overuse in first two weeks if feeling better due to concern for vulnerability while steroid is working  Supportive care reviewed  All questions were answered today  Contact us with additional questions or concerns  Signs and sx of concern reviewed      Cooper Carrasco DO, TONE  Sports Medicine Physician  [facility]              Disclaimer: This note consists of symbols derived from keyboarding, dictation and/or voice recognition software. As a result, there may be errors in the script that have gone undetected. Please consider this when interpreting information found in this chart.    Large Joint Injection/Arthocentesis: R knee joint    Date/Time: 11/2/2022 9:53 AM  Performed by: Cooper Carrasco DO  Authorized by: Cooper Carrasco DO     Indications:  Pain and  osteoarthritis  Needle Size:  21 G  Guidance: ultrasound    Approach:  Superolateral  Location:  Knee      Medications:  40 mg triamcinolone 40 MG/ML; 3 mL ropivacaine 5 MG/ML  Aspirate amount (mL):  15  Aspirate:  Serous and yellow  Outcome:  Tolerated well, no immediate complications  Procedure discussed: discussed risks, benefits, and alternatives    Consent Given by:  Patient  Timeout: timeout called immediately prior to procedure    Prep: patient was prepped and draped in usual sterile fashion     Ultrasound images of procedure were permanently stored.               Again, thank you for allowing me to participate in the care of your patient.        Sincerely,        Cooper Carrasco DO

## 2022-11-02 NOTE — LETTER
November 2, 2022      Jeremy was seen in my office today and is under my care.  He should be medically excused from work until Tuesday 11/8/2022.  Updated recommendations will be provided if needed.      Cooper Ocampo DO, CAQ  Sports Medicine Physician  [facility]

## 2022-11-02 NOTE — PROGRESS NOTES
"Misbah Floyd  :  1970  DOS: 2022  MRN: [id number]    Sports Medicine Clinic Visit    PCP: Leandro Hollis    Misbah Floyd is a 52 year old male who is seen in consultation at the request of  Craig Araya M.D. presenting with right knee pain.    Injury: Patient describes injury as walking at work.  10/27/22, 6  day(s).  Patient presented to ED 10/31/22, progressively woresning. Pain located over right medial knee pain, nonradiating.  Additional Features:  Positive: occasional clicking.  Symptoms are better with rest.  Symptoms are worse with: walking, stairs, weight bearing, twisting.  Other evaluation and/or treatments so far consists of: crutches.  Recent imaging completed: X-rays completed 10/31/22.  Prior History of related problems: yes- Saw PCP 2022 and was prescribed prednisone for quadriceps tendinitis.     Social History: currently employed as sheet metal shop-walking    Review of Systems  Musculoskeletal: as above  Remainder of review of systems is negative including constitutional, CV, pulmonary, GI, Skin and Neurologic except as noted in HPI or medical history.    No past medical history on file.  Past Surgical History:   Procedure Laterality Date     EXCISE MASS HIP Right 2017    Procedure: EXCISE MASS HIP;  Surgeon: Solis Lopes MD;  Location:  OR      CYSTOURETHROSCOPY  2006    Stone basket extraction. Left double-J stent placemnt.     No family history on file.    Objective  BP (!) 184/100   Pulse 69   Ht 1.702 m (5' 7\")   Wt (!) 151 kg (333 lb)   BMI 52.16 kg/m        General: healthy, alert and in no distress, obese      HEENT: no scleral icterus or conjunctival erythema     Skin: no suspicious lesions or rash. No jaundice.     CV: regular rhythm by palpation, 2+ distal pulses, L>R pretibial and pedal edema, signs of early venous insufficiency      Resp: normal respiratory effort without conversational dyspnea     Psych: normal mood and affect "      Gait: nonantalgic, appropriate coordination and balance     Neuro: normal light touch sensory exam of the extremities. Motor strength as noted below     Right Knee exam    ROM:        Flexion lacks 30 degrees       Extension lacks 10 deg degrees       Range of motion limited by pain, stiffness    Inspection:       no visible ecchymosis       effusion noted moderate right    Skin:       no visible deformities       well perfused       capillary refill brisk    Patellar Motion:        Normal patellar tracking noted through range of motion       Crepitus noted in the patellofemoral joint    Tender:        lateral patellar border mild       medial joint linefocal    Non Tender:         remainder of knee area        medial patellar border        lateral joint line        along MCL        distal IT Band        infrapatellar tendon        tibial tubercle       pes anserine bursa    Special Tests:        Painful medial Reynaldo       neg (-) Lachman       neg (-) anterior drawer       neg (-) posterior drawer       neg (-) varus at 0 deg and 30 deg       neg (-) valgus at 0 deg and 30 deg       mild pain with forced extension    Evaluation of ipsilateral kinetic chain       normal strength with hip extension and abduction      Radiology    KNEE THREE VIEWS RIGHT  10/31/2022 11:48 AM      HISTORY: Trauma, pain  COMPARISON: None.                                                                      IMPRESSION: No acute fracture or malalignment. There is normal joint  spacing. Minimal knee joint effusion.     ESPERANZA CORTEZ MD     Assessment:  1. Acute pain of right knee    2. Knee effusion, right    3. Difficulty bearing weight on right lower extremity    4. Limited joint range of motion        Plan:  Discussed the assessment with the patient.  Follow up: 2-3 weeks if no significant benefit from injection today  Subacute right knee pain, no significant recent or remote injury  Suspect some measure of underlying DJD  although imaging with XR fairly reassuring, but was not WB images  Consider advanced imaging for worsening sx or sx that do not improve, does have hx and exam suspicious for meniscus tear although not having any mechanical or instability sx  XR images independently visualized and reviewed with patient today in clinic  Oral Tylenol and topical Voltaren gel reviewed as safe OTC options, reviewed safe dosing strategies  Advised f/u with PCP as planned to review blood pressure, early venous insufficiency, overall health  Low impact activity strategies reviewed  US guided CSI with aspiration today, reviewed relative risks, limitations and potential benefits  Expectations and goals of CSI reviewed  Often 2-3 days for steroid effect, and can take up to two weeks for maximum effect  We discussed modified progressive pain-free activity as tolerated  Do not overuse in first two weeks if feeling better due to concern for vulnerability while steroid is working  Supportive care reviewed  All questions were answered today  Contact us with additional questions or concerns  Signs and sx of concern reviewed      Cooper Carrasco DO, TONE  Sports Medicine Physician  [facility]              Disclaimer: This note consists of symbols derived from keyboarding, dictation and/or voice recognition software. As a result, there may be errors in the script that have gone undetected. Please consider this when interpreting information found in this chart.    Large Joint Injection/Arthocentesis: R knee joint    Date/Time: 11/2/2022 9:53 AM  Performed by: Cooper Carrasco DO  Authorized by: Cooper Carrasco DO     Indications:  Pain and osteoarthritis  Needle Size:  21 G  Guidance: ultrasound    Approach:  Superolateral  Location:  Knee      Medications:  40 mg triamcinolone 40 MG/ML; 3 mL ropivacaine 5 MG/ML  Aspirate amount (mL):  15  Aspirate:  Serous and yellow  Outcome:  Tolerated well, no  immediate complications  Procedure discussed: discussed risks, benefits, and alternatives    Consent Given by:  Patient  Timeout: timeout called immediately prior to procedure    Prep: patient was prepped and draped in usual sterile fashion     Ultrasound images of procedure were permanently stored.

## 2022-12-06 ENCOUNTER — OFFICE VISIT (OUTPATIENT)
Dept: INTERNAL MEDICINE | Facility: CLINIC | Age: 52
End: 2022-12-06
Payer: COMMERCIAL

## 2022-12-06 VITALS
SYSTOLIC BLOOD PRESSURE: 136 MMHG | RESPIRATION RATE: 16 BRPM | BODY MASS INDEX: 48.65 KG/M2 | WEIGHT: 310 LBS | HEIGHT: 67 IN | HEART RATE: 58 BPM | DIASTOLIC BLOOD PRESSURE: 86 MMHG | OXYGEN SATURATION: 98 % | TEMPERATURE: 98.1 F

## 2022-12-06 DIAGNOSIS — Z00.00 ROUTINE GENERAL MEDICAL EXAMINATION AT A HEALTH CARE FACILITY: Primary | ICD-10-CM

## 2022-12-06 DIAGNOSIS — E78.5 HYPERLIPIDEMIA LDL GOAL <130: ICD-10-CM

## 2022-12-06 DIAGNOSIS — E66.01 MORBID OBESITY DUE TO EXCESS CALORIES (H): ICD-10-CM

## 2022-12-06 DIAGNOSIS — G47.33 OSA (OBSTRUCTIVE SLEEP APNEA): ICD-10-CM

## 2022-12-06 DIAGNOSIS — Z11.4 SCREENING FOR HIV (HUMAN IMMUNODEFICIENCY VIRUS): ICD-10-CM

## 2022-12-06 DIAGNOSIS — Z12.11 SCREEN FOR COLON CANCER: ICD-10-CM

## 2022-12-06 DIAGNOSIS — Z11.59 NEED FOR HEPATITIS C SCREENING TEST: ICD-10-CM

## 2022-12-06 DIAGNOSIS — Z82.49 FAMILY HISTORY OF ISCHEMIC HEART DISEASE: ICD-10-CM

## 2022-12-06 DIAGNOSIS — Z12.5 SCREENING FOR PROSTATE CANCER: ICD-10-CM

## 2022-12-06 DIAGNOSIS — R07.2 PRECORDIAL PAIN: ICD-10-CM

## 2022-12-06 LAB
ALBUMIN SERPL-MCNC: 3.8 G/DL (ref 3.4–5)
ALP SERPL-CCNC: 73 U/L (ref 40–150)
ALT SERPL W P-5'-P-CCNC: 35 U/L (ref 0–70)
ANION GAP SERPL CALCULATED.3IONS-SCNC: 1 MMOL/L (ref 3–14)
AST SERPL W P-5'-P-CCNC: 15 U/L (ref 0–45)
BILIRUB SERPL-MCNC: 0.4 MG/DL (ref 0.2–1.3)
BUN SERPL-MCNC: 16 MG/DL (ref 7–30)
CALCIUM SERPL-MCNC: 8.7 MG/DL (ref 8.5–10.1)
CHLORIDE BLD-SCNC: 109 MMOL/L (ref 94–109)
CHOLEST SERPL-MCNC: 193 MG/DL
CO2 SERPL-SCNC: 29 MMOL/L (ref 20–32)
CREAT SERPL-MCNC: 1.24 MG/DL (ref 0.66–1.25)
ERYTHROCYTE [DISTWIDTH] IN BLOOD BY AUTOMATED COUNT: 12.3 % (ref 10–15)
FASTING STATUS PATIENT QL REPORTED: NO
GFR SERPL CREATININE-BSD FRML MDRD: 70 ML/MIN/1.73M2
GLUCOSE BLD-MCNC: 99 MG/DL (ref 70–99)
HCT VFR BLD AUTO: 45.6 % (ref 40–53)
HDLC SERPL-MCNC: 35 MG/DL
HGB BLD-MCNC: 15.2 G/DL (ref 13.3–17.7)
LDLC SERPL CALC-MCNC: 119 MG/DL
MCH RBC QN AUTO: 29.7 PG (ref 26.5–33)
MCHC RBC AUTO-ENTMCNC: 33.3 G/DL (ref 31.5–36.5)
MCV RBC AUTO: 89 FL (ref 78–100)
NONHDLC SERPL-MCNC: 158 MG/DL
PLATELET # BLD AUTO: 244 10E3/UL (ref 150–450)
POTASSIUM BLD-SCNC: 4 MMOL/L (ref 3.4–5.3)
PROT SERPL-MCNC: 7.3 G/DL (ref 6.8–8.8)
PSA SERPL-MCNC: 1.22 UG/L (ref 0–4)
RBC # BLD AUTO: 5.12 10E6/UL (ref 4.4–5.9)
SODIUM SERPL-SCNC: 139 MMOL/L (ref 133–144)
TRIGL SERPL-MCNC: 197 MG/DL
WBC # BLD AUTO: 7.6 10E3/UL (ref 4–11)

## 2022-12-06 PROCEDURE — 80061 LIPID PANEL: CPT | Performed by: INTERNAL MEDICINE

## 2022-12-06 PROCEDURE — 85027 COMPLETE CBC AUTOMATED: CPT | Performed by: INTERNAL MEDICINE

## 2022-12-06 PROCEDURE — 80053 COMPREHEN METABOLIC PANEL: CPT | Performed by: INTERNAL MEDICINE

## 2022-12-06 PROCEDURE — 99396 PREV VISIT EST AGE 40-64: CPT | Performed by: INTERNAL MEDICINE

## 2022-12-06 PROCEDURE — 87389 HIV-1 AG W/HIV-1&-2 AB AG IA: CPT | Performed by: INTERNAL MEDICINE

## 2022-12-06 PROCEDURE — G0103 PSA SCREENING: HCPCS | Performed by: INTERNAL MEDICINE

## 2022-12-06 PROCEDURE — 36415 COLL VENOUS BLD VENIPUNCTURE: CPT | Performed by: INTERNAL MEDICINE

## 2022-12-06 PROCEDURE — 86803 HEPATITIS C AB TEST: CPT | Performed by: INTERNAL MEDICINE

## 2022-12-06 ASSESSMENT — ENCOUNTER SYMPTOMS
DIZZINESS: 0
PARESTHESIAS: 0
CONSTIPATION: 0
SORE THROAT: 0
HEMATOCHEZIA: 0
MYALGIAS: 0
COUGH: 0
HEADACHES: 0
NAUSEA: 0
DIARRHEA: 0
JOINT SWELLING: 0
FEVER: 0
ABDOMINAL PAIN: 0
DYSURIA: 0
SHORTNESS OF BREATH: 0
EYE PAIN: 0
HEMATURIA: 0
NERVOUS/ANXIOUS: 0
FREQUENCY: 0
PALPITATIONS: 0
ARTHRALGIAS: 0
WEAKNESS: 0
HEARTBURN: 0
CHILLS: 0

## 2022-12-06 NOTE — PROGRESS NOTES
SUBJECTIVE:   CC: Jeremy is an 52 year old who presents for preventative health visit.     Patient has been advised of split billing requirements and indicates understanding: Yes  Healthy Habits:     Getting at least 3 servings of Calcium per day:  Yes    Bi-annual eye exam:  NO    Dental care twice a year:  Yes    Sleep apnea or symptoms of sleep apnea:  None    Diet:  Other    Frequency of exercise:  None    Taking medications regularly:  Yes    Medication side effects:  Not applicable    PHQ-2 Total Score: 0    Additional concerns today:  No          Patient notes that he has had some nonspecific chest pressure.  Chest pressure does not seem to be directly associated with exertion.  It does not appear to be associated with eating or position.  He rates it about a 2/10 in intensity but notes that it is noticeable.  Does have a family history of heart disease.  No personal history of prior cardiac event.      Today's PHQ-2 Score:   PHQ-2 ( 1999 [employer]) 12/6/2022   Q1: Little interest or pleasure in doing things 0   Q2: Feeling down, depressed or hopeless 0   PHQ-2 Score 0   PHQ-2 Total Score (12-17 Years)- Positive if 3 or more points; Administer PHQ-A if positive -   Q1: Little interest or pleasure in doing things Not at all   Q2: Feeling down, depressed or hopeless Not at all   PHQ-2 Score 0       Have you ever done Advance Care Planning? (For example, a Health Directive, POLST, or a discussion with a medical provider or your loved ones about your wishes): No, advance care planning information given to patient to review.  Advanced care planning was discussed at today's visit.    Social History     Tobacco Use     Smoking status: Never     Smokeless tobacco: Never   Substance Use Topics     Alcohol use: No     Alcohol/week: 0.0 standard drinks         Alcohol Use 12/6/2022   Prescreen: >3 drinks/day or >7 drinks/week? Not Applicable   Prescreen: >3 drinks/day or >7 drinks/week? -       Last PSA: No results found for:  PSA    Reviewed orders with patient. Reviewed health maintenance and updated orders accordingly - Yes  Lab work is in process  BP Readings from Last 3 Encounters:   12/06/22 136/86   11/02/22 (!) 184/100   10/31/22 (!) 183/94    Wt Readings from Last 3 Encounters:   12/06/22 140.6 kg (310 lb)   11/02/22 (!) 151 kg (333 lb)   10/31/22 (!) 151 kg (333 lb)                  Patient Active Problem List   Diagnosis     Abscess of anal and rectal regions     Morbid obesity due to excess calories (H)     CITLALLI (obstructive sleep apnea)     Past Surgical History:   Procedure Laterality Date     EXCISE MASS HIP Right 2/24/2017    Procedure: EXCISE MASS HIP;  Surgeon: Solis Lopes MD;  Location:  OR      CYSTOURETHROSCOPY  04/21/2006    Stone basket extraction. Left double-J stent placemnt.       Social History     Tobacco Use     Smoking status: Never     Smokeless tobacco: Never   Substance Use Topics     Alcohol use: No     Alcohol/week: 0.0 standard drinks     No family history on file.      No current outpatient medications on file.     Allergies   Allergen Reactions     Bees Swelling     Wasp Venom Protein Other (See Comments)     Amoxicillin Rash     Rash & cellulitis - see 3/16/17 encounter     Bactrim [Sulfamethoxazole W/Trimethoprim] Rash     Rash & cellulitis - see 3/16/17 encounter - this was prescribed last of 3 abx after amoxicillin, cephalexin - rash didn't get better - only after Benadryl did improve     Keflex [Cephalexin] Rash     Rash & cellulitis - see 3/16/17 encounter       Reviewed and updated as needed this visit by clinical staff     Meds              Reviewed and updated as needed this visit by Provider                 No past medical history on file.   Past Surgical History:   Procedure Laterality Date     EXCISE MASS HIP Right 2/24/2017    Procedure: EXCISE MASS HIP;  Surgeon: Solis Lopes MD;  Location:  OR      CYSTOURETHROSCOPY  04/21/2006    Stone basket extraction. Left  "double-J stent placemnt.       Review of Systems   Constitutional: Negative for chills and fever.   HENT: Negative for congestion, ear pain, hearing loss and sore throat.    Eyes: Negative for pain and visual disturbance.   Respiratory: Negative for cough and shortness of breath.    Cardiovascular: Positive for chest pain. Negative for palpitations and peripheral edema.   Gastrointestinal: Negative for abdominal pain, constipation, diarrhea, heartburn, hematochezia and nausea.   Genitourinary: Negative for dysuria, frequency, genital sores, hematuria, impotence, penile discharge and urgency.   Musculoskeletal: Negative for arthralgias, joint swelling and myalgias.   Skin: Negative for rash.   Neurological: Negative for dizziness, weakness, headaches and paresthesias.   Psychiatric/Behavioral: Negative for mood changes. The patient is not nervous/anxious.      Patient has had some mild and vague chest discomfort as described.      OBJECTIVE:   /86   Pulse 58   Temp 98.1  F (36.7  C) (Temporal)   Resp 16   Ht 1.702 m (5' 7\")   Wt 140.6 kg (310 lb)   SpO2 98%   BMI 48.55 kg/m      Physical Exam  GENERAL: healthy, alert and no distress  EYES: Eyes grossly normal to inspection, PERRL and conjunctivae and sclerae normal  HENT: ear canals and TM's normal, nose and mouth without ulcers or lesions  NECK: no adenopathy, no asymmetry, masses, or scars and thyroid normal to palpation  RESP: lungs clear to auscultation - no rales, rhonchi or wheezes  CV: regular rate and rhythm, normal S1 S2, no S3 or S4, no murmur, click or rub, no peripheral edema and peripheral pulses strong  ABDOMEN: soft, nontender, without hepatosplenomegaly or masses, bowel sounds normal and abdomen is obese.  MS: no gross musculoskeletal defects noted, no edema  SKIN: no suspicious lesions or rashes  NEURO: Normal strength and tone, mentation intact and speech normal  PSYCH: mentation appears normal, affect normal/bright  LYMPH: no cervical, " supraclavicular, axillary, or inguinal adenopathy    Diagnostic Test Results:  Results for orders placed or performed in visit on 12/06/22 (from the past 24 hour(s))   Lipid panel reflex to direct LDL Fasting   Result Value Ref Range    Cholesterol 193 <200 mg/dL    Triglycerides 197 (H) <150 mg/dL    Direct Measure HDL 35 (L) >=40 mg/dL    LDL Cholesterol Calculated 119 (H) <=100 mg/dL    Non HDL Cholesterol 158 (H) <130 mg/dL    Patient Fasting > 8hrs? No     Narrative    Cholesterol  Desirable:  <200 mg/dL    Triglycerides  Normal:  Less than 150 mg/dL  Borderline High:  150-199 mg/dL  High:  200-499 mg/dL  Very High:  Greater than or equal to 500 mg/dL    Direct Measure HDL  Female:  Greater than or equal to 50 mg/dL   Male:  Greater than or equal to 40 mg/dL    LDL Cholesterol  Desirable:  <100mg/dL  Above Desirable:  100-129 mg/dL   Borderline High:  130-159 mg/dL   High:  160-189 mg/dL   Very High:  >= 190 mg/dL    Non HDL Cholesterol  Desirable:  130 mg/dL  Above Desirable:  130-159 mg/dL  Borderline High:  160-189 mg/dL  High:  190-219 mg/dL  Very High:  Greater than or equal to 220 mg/dL   PSA, screen   Result Value Ref Range    Prostate Specific Antigen Screen 1.22 0.00 - 4.00 ug/L    Narrative    Assay Method:  Chemiluminescence using Siemens   Vista analyzer.   Comprehensive metabolic panel (BMP + Alb, Alk Phos, ALT, AST, Total. Bili, TP)   Result Value Ref Range    Sodium 139 133 - 144 mmol/L    Potassium 4.0 3.4 - 5.3 mmol/L    Chloride 109 94 - 109 mmol/L    Carbon Dioxide (CO2) 29 20 - 32 mmol/L    Anion Gap 1 (L) 3 - 14 mmol/L    Urea Nitrogen 16 7 - 30 mg/dL    Creatinine 1.24 0.66 - 1.25 mg/dL    Calcium 8.7 8.5 - 10.1 mg/dL    Glucose 99 70 - 99 mg/dL    Alkaline Phosphatase 73 40 - 150 U/L    AST 15 0 - 45 U/L    ALT 35 0 - 70 U/L    Protein Total 7.3 6.8 - 8.8 g/dL    Albumin 3.8 3.4 - 5.0 g/dL    Bilirubin Total 0.4 0.2 - 1.3 mg/dL    GFR Estimate 70 >60 mL/min/1.73m2   CBC with platelets    Result Value Ref Range    WBC Count 7.6 4.0 - 11.0 10e3/uL    RBC Count 5.12 4.40 - 5.90 10e6/uL    Hemoglobin 15.2 13.3 - 17.7 g/dL    Hematocrit 45.6 40.0 - 53.0 %    MCV 89 78 - 100 fL    MCH 29.7 26.5 - 33.0 pg    MCHC 33.3 31.5 - 36.5 g/dL    RDW 12.3 10.0 - 15.0 %    Platelet Count 244 150 - 450 10e3/uL       ASSESSMENT/PLAN:       ICD-10-CM    1. Routine general medical examination at a health care facility  Z00.00 CBC with platelets     Comprehensive metabolic panel (BMP + Alb, Alk Phos, ALT, AST, Total. Bili, TP)     Comprehensive metabolic panel (BMP + Alb, Alk Phos, ALT, AST, Total. Bili, TP)     CBC with platelets      2. CITLALLI (obstructive sleep apnea)  G47.33       3. Family history of ischemic heart disease  Z82.49 Echocardiogram Exercise Stress     Echocardiogram Exercise Stress      4. Precordial pain  R07.2 PSA, screen     PSA, screen      5. Hyperlipidemia LDL goal <130  E78.5 Lipid panel reflex to direct LDL Fasting     Lipid panel reflex to direct LDL Fasting      6. Screen for colon cancer  Z12.11 Colonoscopy Screening  Referral      7. Screening for prostate cancer  Z12.5 PSA, screen     PSA, screen      8. Need for hepatitis C screening test  Z11.59 Hepatitis C Screen Reflex to HCV RNA Quant and Genotype     Hepatitis C Screen Reflex to HCV RNA Quant and Genotype      9. Screening for HIV (human immunodeficiency virus)  Z11.4 HIV Antigen Antibody Combo     HIV Antigen Antibody Combo      10. Morbid obesity due to excess calories (H)  E66.01           Patient has been advised of split billing requirements and indicates understanding: Yes      COUNSELING:   Reviewed preventive health counseling, as reflected in patient instructions       Regular exercise       Healthy diet/nutrition       Vision screening       Hearing screening       Colorectal cancer screening       Prostate cancer screening      BMI:   Estimated body mass index is 48.55 kg/m  as calculated from the following:     "Height as of this encounter: 1.702 m (5' 7\").    Weight as of this encounter: 140.6 kg (310 lb).         He reports that he has never smoked. He has never used smokeless tobacco.          I have reviewed the patient's vaccination schedule and discussed the benefits of prophylactic vaccination in detail.  I recommend the patient contact their pharmacist for vaccinations.  Discussed that most insurance companies now favor reimbursement to the pharmacies and it will financially behoove the patient to have vaccinations performed at their pharmacy.      Leandro Hollis, DO  [facility]  "

## 2022-12-06 NOTE — LETTER
"January 4, 2023      Jeremy Floyd  [address] [place] [facility] [address]        Dear ,    We are writing to inform you of your test results.    The stress echo test demonstrates no inducible ischemia.  \"No obvious blocked arteries\"   Peak blood pressure was somewhat elevated at 220/84.     You will be contacted with any outstanding results as they are available.   Feel free to contact me via the office or My Chart if you have any questions regarding the above.       Resulted Orders   Lipid panel reflex to direct LDL Fasting   Result Value Ref Range    Cholesterol 193 <200 mg/dL    Triglycerides 197 (H) <150 mg/dL    Direct Measure HDL 35 (L) >=40 mg/dL    LDL Cholesterol Calculated 119 (H) <=100 mg/dL    Non HDL Cholesterol 158 (H) <130 mg/dL    Patient Fasting > 8hrs? No     Narrative    Cholesterol  Desirable:  <200 mg/dL    Triglycerides  Normal:  Less than 150 mg/dL  Borderline High:  150-199 mg/dL  High:  200-499 mg/dL  Very High:  Greater than or equal to 500 mg/dL    Direct Measure HDL  Female:  Greater than or equal to 50 mg/dL   Male:  Greater than or equal to 40 mg/dL    LDL Cholesterol  Desirable:  <100mg/dL  Above Desirable:  100-129 mg/dL   Borderline High:  130-159 mg/dL   High:  160-189 mg/dL   Very High:  >= 190 mg/dL    Non HDL Cholesterol  Desirable:  130 mg/dL  Above Desirable:  130-159 mg/dL  Borderline High:  160-189 mg/dL  High:  190-219 mg/dL  Very High:  Greater than or equal to 220 mg/dL   PSA, screen   Result Value Ref Range    Prostate Specific Antigen Screen 1.22 0.00 - 4.00 ug/L    Narrative    Assay Method:  Chemiluminescence using Siemens   Vista analyzer.   Comprehensive metabolic panel (BMP + Alb, Alk Phos, ALT, AST, Total. Bili, TP)   Result Value Ref Range    Sodium 139 133 - 144 mmol/L    Potassium 4.0 3.4 - 5.3 mmol/L    Chloride 109 94 - 109 mmol/L    Carbon Dioxide (CO2) 29 20 - 32 mmol/L    Anion Gap 1 (L) 3 - 14 mmol/L    Urea Nitrogen 16 7 - 30 mg/dL    Creatinine " 1.24 0.66 - 1.25 mg/dL    Calcium 8.7 8.5 - 10.1 mg/dL    Glucose 99 70 - 99 mg/dL    Alkaline Phosphatase 73 40 - 150 U/L    AST 15 0 - 45 U/L    ALT 35 0 - 70 U/L    Protein Total 7.3 6.8 - 8.8 g/dL    Albumin 3.8 3.4 - 5.0 g/dL    Bilirubin Total 0.4 0.2 - 1.3 mg/dL    GFR Estimate 70 >60 mL/min/1.73m2      Comment:      Effective 2021 eGFRcr in adults is calculated using the  CKD-EPI creatinine equation which includes age and gender (Jaime et al., NEJ, DOI: 10.1056/XLDSys4658985)   CBC with platelets   Result Value Ref Range    WBC Count 7.6 4.0 - 11.0 10e3/uL    RBC Count 5.12 4.40 - 5.90 10e6/uL    Hemoglobin 15.2 13.3 - 17.7 g/dL    Hematocrit 45.6 40.0 - 53.0 %    MCV 89 78 - 100 fL    MCH 29.7 26.5 - 33.0 pg    MCHC 33.3 31.5 - 36.5 g/dL    RDW 12.3 10.0 - 15.0 %    Platelet Count 244 150 - 450 10e3/uL   Echocardiogram Exercise Stress    Narrative    [id number]  [id number]  [id number]  [id number]^YONATAN^BLAKE^STEFANIA     [facility]  Echocardiography Laboratory  [address] [place] [address]     Name: LIBIA BANKS  MRN: [id number]  : 1970  Study Date: 2023 09:25 AM  Age: 52 yrs  Gender: Male  Patient Location: [facility]  Reason For Study: Family history of ischemic heart disease  History: Obesity  Ordering Physician: BLAKE TAM  Referring Physician: BLAKE TAM  Performed By: Carin Augustin     BSA: 2.4 m2  Height: 67 in  Weight: 310 lb  HR: 57  BP: 132/80 mmHg  ______________________________________________________________________________  Procedure  Stress Echo Complete. Optison (NDC #[id number]) given intravenously.  ______________________________________________________________________________  Interpretation Summary  Normal resting heart rate and blood pressure with hypertensive response to  exercise  No chest pain with exercise  Below average exercise capacity for age  Normal resting ECG, no ischemic changes seen  with exercise  Normal resting LV function with good augmentation of all wall segments  postexercise  This test indicates low probability of significant exercise-induced myocardial  ischemia but with hypertensive response to exercise.  Contrast was used without apparent complications. No hemodynamically  significant valvular abnormalities on 2D or color flow imaging.  ______________________________________________________________________________  Stress  Exercise was stopped due to fatigue.  There was a hypertensive BP response to exercise.  The patient exhibited no chest pain during exercise.  Target Heart Rate was achieved.  The EKG portion of this stress test was negative for inducible ischemia (see  echo results below).  Left ventricular cavity size decreases with exercise.  Global LV systolic function augments with exercise.  Normal left ventricular function and wall motion at rest and post-stress.     Baseline  Normal baseline electrocardiogram.  Normal left ventricular function and wall motion at rest.     Stress Results         Protocol:  Eric Protocol        Maximum Predicted HR:   169 bpm         Target HR: 144 bpm               % Maximum Predicted HR: 95 %        Stage  DurationHeart Rate  BP                    Comment             (mm:ss)   (bpm)    Stage 1   3:00      110   190/74    Stage 2   3:00      141   220/84    Stage 3   1:00      160      /  [id number], FAC:Below Average, Álvarez Score:9   RecoveryR  5:04      80    148/64             Stress Duration:   7:00 mm:ss        Recovery Time: 5:04 mm:ss          Maximum Stress HR: 160 bpm           METS:          9     Mitral Valve  The mitral valve leaflets appear normal. There is no evidence of stenosis,  fluttering, or prolapse. There is trace to mild mitral regurgitation.     Tricuspid Valve  Normal tricuspid valve. There is trace tricuspid regurgitation.     Aortic Valve  The aortic valve is normal in structure and  function.  ______________________________________________________________________________  MMode/2D Measurements & Calculations  Ao root diam: 3.8 cm  asc Aorta Diam: 3.6 cm     ______________________________________________________________________________  Report approved by: Se Morales 01/02/2023 12:54 PM         Hepatitis C Screen Reflex to HCV RNA Quant and Genotype   Result Value Ref Range    Hepatitis C Antibody Nonreactive Nonreactive    Narrative    Assay performance characteristics have not been established for newborns, infants, and children.   HIV Antigen Antibody Combo   Result Value Ref Range    HIV Antigen Antibody Combo Nonreactive Nonreactive      Comment:      HIV-1 p24 Ag & HIV-1/HIV-2 Ab Not Detected       If you have any questions or concerns, please call the clinic at the number listed above.       Sincerely,      Leandro Hollis DO

## 2022-12-06 NOTE — LETTER
December 19, 2022      Jeremy Floyd  [address] [facility] [address]        Dear ,    We are writing to inform you of your test results.    Your screening hepatitis C test is negative.   Your screening HIV test is negative.   The prostate-specific antigen is consistent with a low risk of prostate cancer.   Cholesterol slightly elevated with an LDL of 119 though this is markedly improved over values from 5 years ago.  Would recommend continued low-fat dieting.   The chemistry panel is within acceptable limits including the blood sugar, kidney and liver tests.   Blood cell count is within acceptable limits.  There is no evidence of anemia or leukemia.        Resulted Orders   Lipid panel reflex to direct LDL Fasting   Result Value Ref Range    Cholesterol 193 <200 mg/dL    Triglycerides 197 (H) <150 mg/dL    Direct Measure HDL 35 (L) >=40 mg/dL    LDL Cholesterol Calculated 119 (H) <=100 mg/dL    Non HDL Cholesterol 158 (H) <130 mg/dL    Patient Fasting > 8hrs? No     Narrative    Cholesterol  Desirable:  <200 mg/dL    Triglycerides  Normal:  Less than 150 mg/dL  Borderline High:  150-199 mg/dL  High:  200-499 mg/dL  Very High:  Greater than or equal to 500 mg/dL    Direct Measure HDL  Female:  Greater than or equal to 50 mg/dL   Male:  Greater than or equal to 40 mg/dL    LDL Cholesterol  Desirable:  <100mg/dL  Above Desirable:  100-129 mg/dL   Borderline High:  130-159 mg/dL   High:  160-189 mg/dL   Very High:  >= 190 mg/dL    Non HDL Cholesterol  Desirable:  130 mg/dL  Above Desirable:  130-159 mg/dL  Borderline High:  160-189 mg/dL  High:  190-219 mg/dL  Very High:  Greater than or equal to 220 mg/dL   PSA, screen   Result Value Ref Range    Prostate Specific Antigen Screen 1.22 0.00 - 4.00 ug/L    Narrative    Assay Method:  Chemiluminescence using Siemens   Vista analyzer.   Comprehensive metabolic panel (BMP + Alb, Alk Phos, ALT, AST, Total. Bili, TP)   Result Value Ref Range    Sodium 139 133 -  144 mmol/L    Potassium 4.0 3.4 - 5.3 mmol/L    Chloride 109 94 - 109 mmol/L    Carbon Dioxide (CO2) 29 20 - 32 mmol/L    Anion Gap 1 (L) 3 - 14 mmol/L    Urea Nitrogen 16 7 - 30 mg/dL    Creatinine 1.24 0.66 - 1.25 mg/dL    Calcium 8.7 8.5 - 10.1 mg/dL    Glucose 99 70 - 99 mg/dL    Alkaline Phosphatase 73 40 - 150 U/L    AST 15 0 - 45 U/L    ALT 35 0 - 70 U/L    Protein Total 7.3 6.8 - 8.8 g/dL    Albumin 3.8 3.4 - 5.0 g/dL    Bilirubin Total 0.4 0.2 - 1.3 mg/dL    GFR Estimate 70 >60 mL/min/1.73m2      Comment:      Effective December 21, 2021 eGFRcr in adults is calculated using the 2021 CKD-EPI creatinine equation which includes age and gender (Jaime montalvo al., NE, DOI: 10.1056/EPRFie5643631)   CBC with platelets   Result Value Ref Range    WBC Count 7.6 4.0 - 11.0 10e3/uL    RBC Count 5.12 4.40 - 5.90 10e6/uL    Hemoglobin 15.2 13.3 - 17.7 g/dL    Hematocrit 45.6 40.0 - 53.0 %    MCV 89 78 - 100 fL    MCH 29.7 26.5 - 33.0 pg    MCHC 33.3 31.5 - 36.5 g/dL    RDW 12.3 10.0 - 15.0 %    Platelet Count 244 150 - 450 10e3/uL   Hepatitis C Screen Reflex to HCV RNA Quant and Genotype   Result Value Ref Range    Hepatitis C Antibody Nonreactive Nonreactive    Narrative    Assay performance characteristics have not been established for newborns, infants, and children.   HIV Antigen Antibody Combo   Result Value Ref Range    HIV Antigen Antibody Combo Nonreactive Nonreactive      Comment:      HIV-1 p24 Ag & HIV-1/HIV-2 Ab Not Detected       If you have any questions or concerns, please call the clinic at the number listed above.       Sincerely,      Leandro Hollis DO

## 2022-12-07 LAB
HCV AB SERPL QL IA: NONREACTIVE
HIV 1+2 AB+HIV1 P24 AG SERPL QL IA: NONREACTIVE

## 2022-12-09 ENCOUNTER — TELEPHONE (OUTPATIENT)
Dept: GASTROENTEROLOGY | Facility: CLINIC | Age: 52
End: 2022-12-09

## 2022-12-09 NOTE — TELEPHONE ENCOUNTER
Screening Questions  BLUE  KIND OF PREP RED  LOCATION [review exclusion criteria] GREEN  SEDATION TYPE        N Are you active on mychart?       Leandro Hollis, DO Ordering/Referring Provider?        [employer] What type of coverage do you have?      N Have you had a positive covid test in the last 14 days?     48.55 1. BMI  [BMI 40+ - review exclusion criteria]    Y  2. Are you able to give consent for your medical care? [IF NO,RN REVIEW]        N  3. Are you taking any prescription pain medications on a routine schedule?      N  3a. EXTENDED PREP What kind of prescription?     N 4. Do you have any chemical dependencies such as alcohol, street drugs, or methadone?    N 5. Do you have any history of post-traumatic stress syndrome, severe anxiety or history of psychosis?      **If yes 3- 5 , please schedule with MAC sedation.**          IF YES TO ANY 6 - 10 - HOSPITAL SETTING ONLY.     N 6.   Do you need assistance transferring?     N 7.   Have you had a heart or lung transplant?    N 8.   Are you currently on dialysis?   N 9.   Do you use daily home oxygen?   N 10. Do you take nitroglycerin?   10a. N If yes, how often?     11. [FEMALES]  N Are you currently pregnant?    11a. N If yes, how many weeks? [ Greater than 12 weeks, OR NEEDED]    N 12. Do you have Pulmonary Hypertension? *NEED PAC APPT AT [facility]*     N 13. [review exclusion criteria]  Do you have any implantable devices in your body (pacemaker, defib, LVAD)?    N 14. In the past 6 months, have you had any heart related issues including cardiomyopathy or heart attack?     14a. N If yes, did it require cardiac stenting if so when?     N 15. Have you had a stroke or Transient ischemic attack (TIA - aka  mini stroke ) within 6 months?      Y 16. Do you have mod to severe Obstructive Sleep Apnea?  [Hospital only - Ok at [place]]    N 17. Do you have SEVERE AND UNCONTROLLED asthma? *NEED PAC APPT AT [facility]*     N 18. Are you currently taking  "any blood thinners?     18a. If yes, inform patient to \"follow up w/ ordering provider for bridging instructions.\"    N 19. Do you take the medication Phentermine?    19a. If yes, \"Hold for 7 days before procedure.  Please consult your prescribing provider if you have questions about holding this medication.\"     N  20. Do you have chronic kidney disease?      N  21. Do you have a diagnosis of diabetes?     N  22. On a regular basis do you go 3-5 days between bowel movements?      23. Preferred LOCAL Pharmacy for Pre Prescription    [ LIST ONLY ONE PHARMACY]      [place], [place] - [address] [place] [address] DR        - CLOSING REMINDERS -    Informed patient they will need an adult    Cannot take any type of public or medical transportation alone    Conscious Sedation- Needs  for 6 hours after the procedure       MAC/General-Needs  for 24 hours after procedure    Pre-Procedure Covid test to be completed [[facility] PCR Testing Required]    Confirmed Nurse will call to complete assessment       - SCHEDULING DETAILS -  N Hospital Setting Required? If yes, what is the exclusion?: --   PALAK  Surgeon    02/07/23  Date of Procedure  Lower Endoscopy [Colonoscopy]  Type of Procedure Scheduled  [facility] Location   GOLYTELY EXTENDED - If you answer yes to questions #1, #3, #22 ([place] and CF pts)Which Colonoscopy Prep was Sent?     MAC Sedation Type     N PAC / Pre-op Required               "

## 2022-12-15 ENCOUNTER — TELEPHONE (OUTPATIENT)
Dept: INTERNAL MEDICINE | Facility: CLINIC | Age: 52
End: 2022-12-15

## 2023-01-02 ENCOUNTER — HOSPITAL ENCOUNTER (OUTPATIENT)
Dept: CARDIOLOGY | Facility: CLINIC | Age: 53
Discharge: HOME | End: 2023-01-02
Attending: INTERNAL MEDICINE | Admitting: INTERNAL MEDICINE
Payer: COMMERCIAL

## 2023-01-02 PROCEDURE — 255N000002 HC RX 255 OP 636: Performed by: INTERNAL MEDICINE

## 2023-01-02 PROCEDURE — 93017 CV STRESS TEST TRACING ONLY: CPT

## 2023-01-02 PROCEDURE — 93350 STRESS TTE ONLY: CPT | Mod: 26 | Performed by: INTERNAL MEDICINE

## 2023-01-02 PROCEDURE — 999N000208 ECHO STRESS ECHOCARDIOGRAM

## 2023-01-02 PROCEDURE — 93325 DOPPLER ECHO COLOR FLOW MAPG: CPT | Mod: 26 | Performed by: INTERNAL MEDICINE

## 2023-01-02 PROCEDURE — 93018 CV STRESS TEST I&R ONLY: CPT | Performed by: INTERNAL MEDICINE

## 2023-01-02 PROCEDURE — 93016 CV STRESS TEST SUPVJ ONLY: CPT | Performed by: INTERNAL MEDICINE

## 2023-01-02 PROCEDURE — 93321 DOPPLER ECHO F-UP/LMTD STD: CPT | Mod: 26 | Performed by: INTERNAL MEDICINE

## 2023-01-02 PROCEDURE — C8928 TTE W OR W/O FOL W/CON,STRES: HCPCS

## 2023-01-02 RX ADMIN — HUMAN ALBUMIN MICROSPHERES AND PERFLUTREN 5 ML: 10; .22 INJECTION, SOLUTION INTRAVENOUS at 09:35

## 2023-02-06 ENCOUNTER — ANESTHESIA EVENT (OUTPATIENT)
Dept: GASTROENTEROLOGY | Facility: CLINIC | Age: 53
End: 2023-02-06
Payer: COMMERCIAL

## 2023-02-06 NOTE — H&P
[facility] History and Physical    Misbah Floyd MRN# [id number]   Age: 52 year old YOB: 1970     Date of Admission:  (Not on file)    Home clinic: [facility]  Primary care provider: Leandro Hollis          Impression and Plan:   Impression:   Screening for colon cancer [Z12.11]  No prior colonoscopy in system      Plan:   Proceed to Colonoscopy as planned.  The procedure, risks(bleeding, perforation), benefits and alternatives were discussed and the patient agrees to proceed. Cleared for Anesthesia             Chief Complaint:   Screening for colon cancer [Z12.11]    History is obtained from the patient          History of Present Illness:   This 52 year old male is being seen at this time for evaluation for colonoscopy.  No complaints or family hx/           Past Medical History:   No past medical history on file.         Past Surgical History:     Past Surgical History:   Procedure Laterality Date     EXCISE MASS HIP Right 2/24/2017    Procedure: EXCISE MASS HIP;  Surgeon: Solis Lopes MD;  Location:  OR      CYSTOURETHROSCOPY  04/21/2006    Stone basket extraction. Left double-J stent placemnt.            Social History:     Social History     Tobacco Use     Smoking status: Never     Smokeless tobacco: Never   Substance Use Topics     Alcohol use: No     Alcohol/week: 0.0 standard drinks            Family History:   History reviewed. No pertinent family history.         Immunizations:     VACCINE/DOSE   Diptheria   DPT   DTAP   HBIG   Hepatitis A   Hepatitis B   HIB   Influenza   Measles   Meningococcal   MMR   Mumps   Pneumococcal   Polio   Rubella   Small Pox   TDAP   Varicella   Zoster            Allergies:     Allergies   Allergen Reactions     Bees Swelling     Wasp Venom Protein Other (See Comments)     Amoxicillin Rash     Rash & cellulitis - see 3/16/17 encounter     Bactrim [Sulfamethoxazole W/Trimethoprim] Rash     Rash &  cellulitis - see 3/16/17 encounter - this was prescribed last of 3 abx after amoxicillin, cephalexin - rash didn't get better - only after Benadryl did improve     Keflex [Cephalexin] Rash     Rash & cellulitis - see 3/16/17 encounter            Medications:     No current facility-administered medications for this encounter.     No current outpatient medications on file.             Review of Systems:   The review of systems was positive for the following findings.  None.  The remainder of the review of systems was unremarkable.          Physical Exam:   All vitals have been reviewed  There were no vitals taken for this visit.  No intake or output data in the 24 hours ending 02/06/23 1440  SHEENT examination revealed NC/AT, EOMI.  Examination of the chest revealed CTA.  Examination of the heart revealed RRR.  Examination of the abdomen revealed soft, non tender.  The neuromuscular examination was NL.          Data:   All laboratory data reviewed  No results found for any visits on 02/07/23.  -     Solis Lopes MD, FACS

## 2023-02-07 ENCOUNTER — ANESTHESIA (OUTPATIENT)
Dept: GASTROENTEROLOGY | Facility: CLINIC | Age: 53
End: 2023-02-07
Payer: COMMERCIAL

## 2023-02-07 ENCOUNTER — HOSPITAL ENCOUNTER (OUTPATIENT)
Facility: CLINIC | Age: 53
Discharge: HOME | End: 2023-02-07
Attending: SPECIALIST | Admitting: SPECIALIST
Payer: COMMERCIAL

## 2023-02-07 VITALS
HEART RATE: 57 BPM | RESPIRATION RATE: 16 BRPM | OXYGEN SATURATION: 97 % | TEMPERATURE: 98.8 F | DIASTOLIC BLOOD PRESSURE: 92 MMHG | SYSTOLIC BLOOD PRESSURE: 151 MMHG

## 2023-02-07 LAB — COLONOSCOPY: NORMAL

## 2023-02-07 PROCEDURE — 250N000011 HC RX IP 250 OP 636: Performed by: NURSE ANESTHETIST, CERTIFIED REGISTERED

## 2023-02-07 PROCEDURE — 88305 TISSUE EXAM BY PATHOLOGIST: CPT | Mod: 26 | Performed by: PATHOLOGY

## 2023-02-07 PROCEDURE — 45380 COLONOSCOPY AND BIOPSY: CPT | Mod: PT | Performed by: SPECIALIST

## 2023-02-07 PROCEDURE — 370N000017 HC ANESTHESIA TECHNICAL FEE, PER MIN: Performed by: SPECIALIST

## 2023-02-07 PROCEDURE — 250N000009 HC RX 250: Performed by: NURSE ANESTHETIST, CERTIFIED REGISTERED

## 2023-02-07 PROCEDURE — 44389 COLONOSCOPY WITH BIOPSY: CPT | Performed by: SPECIALIST

## 2023-02-07 PROCEDURE — 88305 TISSUE EXAM BY PATHOLOGIST: CPT | Mod: TC | Performed by: SPECIALIST

## 2023-02-07 PROCEDURE — 258N000003 HC RX IP 258 OP 636: Performed by: NURSE ANESTHETIST, CERTIFIED REGISTERED

## 2023-02-07 RX ORDER — PROPOFOL 10 MG/ML
INJECTION, EMULSION INTRAVENOUS PRN
Status: DISCONTINUED | OUTPATIENT
Start: 2023-02-07 | End: 2023-02-07

## 2023-02-07 RX ORDER — LIDOCAINE HYDROCHLORIDE 20 MG/ML
INJECTION, SOLUTION INFILTRATION; PERINEURAL PRN
Status: DISCONTINUED | OUTPATIENT
Start: 2023-02-07 | End: 2023-02-07

## 2023-02-07 RX ORDER — PROPOFOL 10 MG/ML
INJECTION, EMULSION INTRAVENOUS CONTINUOUS PRN
Status: DISCONTINUED | OUTPATIENT
Start: 2023-02-07 | End: 2023-02-07

## 2023-02-07 RX ORDER — SODIUM CHLORIDE, SODIUM LACTATE, POTASSIUM CHLORIDE, CALCIUM CHLORIDE 600; 310; 30; 20 MG/100ML; MG/100ML; MG/100ML; MG/100ML
INJECTION, SOLUTION INTRAVENOUS CONTINUOUS PRN
Status: DISCONTINUED | OUTPATIENT
Start: 2023-02-07 | End: 2023-02-07

## 2023-02-07 RX ADMIN — PROPOFOL 50 MG: 10 INJECTION, EMULSION INTRAVENOUS at 14:08

## 2023-02-07 RX ADMIN — PROPOFOL 150 MCG/KG/MIN: 10 INJECTION, EMULSION INTRAVENOUS at 14:08

## 2023-02-07 RX ADMIN — LIDOCAINE HYDROCHLORIDE 50 MG: 20 INJECTION, SOLUTION INFILTRATION; PERINEURAL at 14:08

## 2023-02-07 RX ADMIN — SODIUM CHLORIDE, POTASSIUM CHLORIDE, SODIUM LACTATE AND CALCIUM CHLORIDE: 600; 310; 30; 20 INJECTION, SOLUTION INTRAVENOUS at 14:07

## 2023-02-07 ASSESSMENT — ACTIVITIES OF DAILY LIVING (ADL)
ADLS_ACUITY_SCORE: 35
ADLS_ACUITY_SCORE: 35

## 2023-02-07 NOTE — DISCHARGE INSTRUCTIONS
[facility]    Home Care Following Endoscopy          Activity:  You have just undergone an endoscopic procedure usually performed with conscious sedation.  Do not work or operate machinery (including a car) for at least 12 hours.    I encourage you to walk and attempt to pass this air as soon as possible.    Diet:  Return to the diet you were on before your procedure but eat lightly for the first 12-24 hours.  Drink plenty of water.  Resume any regular medications unless otherwise advised by your physician.  Please begin any new medication prescribed as a result of your procedure as directed by your physician.   If you had any biopsy or polyp removed please refrain from aspirin or aspirin products for 2 days.  If on Coumadin please restart as instructed by your physician.   Pain:  You may take Tylenol as needed for pain.  Expected Recovery:  You can expect some mild abdominal fullness and/or discomfort due to the air used to inflate your intestinal tract. It is also normal to have a mild sore throat after upper endoscopy.    Call Your Physician if You Have:  After Colonoscopy:  Worsening persisting abdominal pain which is worse with activity.  Fevers (>101 degrees F), chills or shakes.  Passage of continued blood with bowel movements.   Any questions or concerns about your recovery, please call [phone] or after hours [phone] Nurse Advice Line.    Follow-up Care:  IF YOU HAD polyps/biopsy tissue sample(s) removed.  The polyps/biopsy tissue sample(s) will be sent to pathology.  You should receive letter in your My Chart with your results within 1-2 weeks. If you do not participate in My Chart a physical letter will come in the mail in 2-3 weeks.  Please call if you have not received a notification of your results.  If asked to return to clinic please make an appointment 1 week after your procedure.  Call [phone].

## 2023-02-07 NOTE — ANESTHESIA PREPROCEDURE EVALUATION
Anesthesia Pre-Procedure Evaluation    Patient: Misbah Floyd   MRN: [id number] : 1970        Procedure : Procedure(s):  COLONOSCOPY          No past medical history on file.   Past Surgical History:   Procedure Laterality Date     EXCISE MASS HIP Right 2017    Procedure: EXCISE MASS HIP;  Surgeon: Solis Lopes MD;  Location:  OR      CYSTOURETHROSCOPY  2006    Stone basket extraction. Left double-J stent placemnt.      Allergies   Allergen Reactions     Bees Swelling     Wasp Venom Protein Other (See Comments)     Amoxicillin Rash     Rash & cellulitis - see 3/16/17 encounter     Bactrim [Sulfamethoxazole W/Trimethoprim] Rash     Rash & cellulitis - see 3/16/17 encounter - this was prescribed last of 3 abx after amoxicillin, cephalexin - rash didn't get better - only after Benadryl did improve     Keflex [Cephalexin] Rash     Rash & cellulitis - see 3/16/17 encounter      Social History     Tobacco Use     Smoking status: Never     Smokeless tobacco: Never   Substance Use Topics     Alcohol use: No     Alcohol/week: 0.0 standard drinks      Wt Readings from Last 1 Encounters:   22 140.6 kg (310 lb)        Anesthesia Evaluation   Pt has had prior anesthetic. Type: General.        ROS/MED HX  ENT/Pulmonary:     (+) sleep apnea,     Neurologic:  - neg neurologic ROS     Cardiovascular:  - neg cardiovascular ROS   (+) -----Previous cardiac testing   Echo: Date: Results:    Stress Test: Date: 2023 Results:  Normal resting heart rate and blood pressure with hypertensive response to  exercise  No chest pain with exercise  Below average exercise capacity for age  Normal resting ECG, no ischemic changes seen with exercise  Normal resting LV function with good augmentation of all wall segments  postexercise  This test indicates low probability of significant exercise-induced myocardial  ischemia but with hypertensive response to exercise.  Contrast was used without apparent  complications. No hemodynamically  significant valvular abnormalities on 2D or color flow imaging.  ECG Reviewed: Date: Results:    Cath: Date: Results:      METS/Exercise Tolerance:     Hematologic:  - neg hematologic  ROS     Musculoskeletal:  - neg musculoskeletal ROS     GI/Hepatic: Comment: Abscess of anal and rectal regions      Renal/Genitourinary:  - neg Renal ROS     Endo:     (+) Obesity,     Psychiatric/Substance Use:  - neg psychiatric ROS     Infectious Disease:  - neg infectious disease ROS     Malignancy:  - neg malignancy ROS     Other:            Physical Exam    Airway  airway exam normal      Mallampati: II   TM distance: > 3 FB   Neck ROM: full   Mouth opening: > 3 cm    Respiratory Devices and Support         Dental           Cardiovascular   cardiovascular exam normal       Rhythm and rate: regular and normal     Pulmonary   pulmonary exam normal        breath sounds clear to auscultation           OUTSIDE LABS:  CBC:   Lab Results   Component Value Date    WBC 7.6 12/06/2022    WBC 8.1 01/20/2017    HGB 15.2 12/06/2022    HGB 15.5 01/20/2017    HCT 45.6 12/06/2022    HCT 45.9 01/20/2017     12/06/2022     01/20/2017     BMP:   Lab Results   Component Value Date     12/06/2022     01/20/2017    POTASSIUM 4.0 12/06/2022    POTASSIUM 4.5 01/20/2017    CHLORIDE 109 12/06/2022    CHLORIDE 102 01/20/2017    CO2 29 12/06/2022    CO2 34 (H) 01/20/2017    BUN 16 12/06/2022    BUN 18 01/20/2017    CR 1.24 12/06/2022    CR 0.92 01/20/2017    GLC 99 12/06/2022    GLC 92 01/20/2017     COAGS: No results found for: PTT, INR, FIBR  POC: No results found for: BGM, HCG, HCGS  HEPATIC:   Lab Results   Component Value Date    ALBUMIN 3.8 12/06/2022    PROTTOTAL 7.3 12/06/2022    ALT 35 12/06/2022    AST 15 12/06/2022    ALKPHOS 73 12/06/2022    BILITOTAL 0.4 12/06/2022     OTHER:   Lab Results   Component Value Date    PH 7.41 02/03/2017    DIANA 8.7 12/06/2022       Anesthesia  Plan    ASA Status:  3   NPO Status:  NPO Appropriate    Anesthesia Type: MAC.     - Reason for MAC: straight local not clinically adequate   Induction: Intravenous, Propofol.   Maintenance: TIVA.        Consents    Anesthesia Plan(s) and associated risks, benefits, and realistic alternatives discussed. Questions answered and patient/representative(s) expressed understanding.    - Discussed:     - Discussed with:  Patient      - Extended Intubation/Ventilatory Support Discussed: No.      - Patient is DNR/DNI Status: No    Use of blood products discussed: No .     Postoperative Care    Pain management: Oral pain medications.   PONV prophylaxis: Background Propofol Infusion     Comments:    Other Comments: The risks and benefits of anesthesia, and the alternatives where applicable, have been discussed with the patient, and they wish to proceed.            CHRISTIANE Leija CRNA

## 2023-02-07 NOTE — ANESTHESIA POSTPROCEDURE EVALUATION
Patient: Misbah Floyd    Procedure: Procedure(s):  COLONOSCOPY, THROUGH STOMA, WITH BIOPSY       Anesthesia Type:  MAC    Note:  Disposition: Outpatient   Postop Pain Control: Uneventful            Sign Out: Well controlled pain   PONV: No   Neuro/Psych: Uneventful            Sign Out: Acceptable/Baseline neuro status   Airway/Respiratory: Uneventful            Sign Out: Acceptable/Baseline resp. status   CV/Hemodynamics: Uneventful            Sign Out: Acceptable CV status   Other NRE: NONE   DID A NON-ROUTINE EVENT OCCUR? No    Event details/Postop Comments:  Pt was happy with anesthesia care.  No complications.  I will follow up with the pt if needed.           Last vitals:  Vitals Value Taken Time   /79 02/07/23 1435   Temp     Pulse 62 02/07/23 1435   Resp     SpO2 94 % 02/07/23 1436   Vitals shown include unvalidated device data.    Electronically Signed By: CHRISTIANE Leija CRNA  February 7, 2023  2:37 PM

## 2023-02-07 NOTE — ANESTHESIA CARE TRANSFER NOTE
Patient: Misbah Floyd    Procedure: Procedure(s):  COLONOSCOPY, THROUGH STOMA, WITH BIOPSY       Diagnosis: Screening for colon cancer [Z12.11]  Diagnosis Additional Information: No value filed.    Anesthesia Type:   MAC     Note:    Oropharynx: oropharynx clear of all foreign objects and spontaneously breathing  Level of Consciousness: drowsy  Oxygen Supplementation: room air    Independent Airway: airway patency satisfactory and stable  Dentition: dentition unchanged  Vital Signs Stable: post-procedure vital signs reviewed and stable  Report to RN Given: handoff report given  Patient transferred to: Phase II    Handoff Report: Identifed the Patient, Identified the Reponsible Provider, Reviewed the pertinent medical history, Discussed the surgical course, Reviewed Intra-OP anesthesia mangement and issues during anesthesia, Set expectations for post-procedure period and Allowed opportunity for questions and acknowledgement of understanding      Vitals:  Vitals Value Taken Time   BP     Temp     Pulse     Resp     SpO2         Electronically Signed By: CHRISTIANE Leija CRNA  February 7, 2023  2:33 PM

## 2023-02-10 LAB
PATH REPORT.COMMENTS IMP SPEC: NORMAL
PATH REPORT.COMMENTS IMP SPEC: NORMAL
PATH REPORT.FINAL DX SPEC: NORMAL
PATH REPORT.GROSS SPEC: NORMAL
PATH REPORT.MICROSCOPIC SPEC OTHER STN: NORMAL
PATH REPORT.RELEVANT HX SPEC: NORMAL
PHOTO IMAGE: NORMAL

## 2023-05-19 ENCOUNTER — APPOINTMENT (OUTPATIENT)
Dept: SLEEP MEDICINE | Facility: CLINIC | Age: 53
End: 2023-05-19
Payer: COMMERCIAL

## 2023-11-06 ENCOUNTER — PATIENT OUTREACH (OUTPATIENT)
Dept: CARE COORDINATION | Facility: CLINIC | Age: 53
End: 2023-11-06
Payer: COMMERCIAL

## 2023-11-20 ENCOUNTER — PATIENT OUTREACH (OUTPATIENT)
Dept: CARE COORDINATION | Facility: CLINIC | Age: 53
End: 2023-11-20
Payer: COMMERCIAL

## 2024-02-21 ENCOUNTER — OFFICE VISIT (OUTPATIENT)
Dept: SLEEP MEDICINE | Facility: CLINIC | Age: 54
End: 2024-02-21
Payer: COMMERCIAL

## 2024-02-21 VITALS
HEIGHT: 67 IN | DIASTOLIC BLOOD PRESSURE: 76 MMHG | WEIGHT: 315 LBS | HEART RATE: 76 BPM | SYSTOLIC BLOOD PRESSURE: 138 MMHG | OXYGEN SATURATION: 97 % | BODY MASS INDEX: 49.44 KG/M2

## 2024-02-21 DIAGNOSIS — G47.33 OSA (OBSTRUCTIVE SLEEP APNEA): Primary | ICD-10-CM

## 2024-02-21 PROCEDURE — 99213 OFFICE O/P EST LOW 20 MIN: CPT | Performed by: PHYSICIAN ASSISTANT

## 2024-02-21 ASSESSMENT — SLEEP AND FATIGUE QUESTIONNAIRES
HOW LIKELY ARE YOU TO NOD OFF OR FALL ASLEEP WHILE SITTING QUIETLY AFTER LUNCH WITHOUT ALCOHOL: SLIGHT CHANCE OF DOZING
HOW LIKELY ARE YOU TO NOD OFF OR FALL ASLEEP WHILE SITTING AND TALKING TO SOMEONE: WOULD NEVER DOZE
HOW LIKELY ARE YOU TO NOD OFF OR FALL ASLEEP WHILE LYING DOWN TO REST IN THE AFTERNOON WHEN CIRCUMSTANCES PERMIT: SLIGHT CHANCE OF DOZING
HOW LIKELY ARE YOU TO NOD OFF OR FALL ASLEEP WHILE SITTING AND READING: SLIGHT CHANCE OF DOZING
HOW LIKELY ARE YOU TO NOD OFF OR FALL ASLEEP WHILE WATCHING TV: SLIGHT CHANCE OF DOZING
HOW LIKELY ARE YOU TO NOD OFF OR FALL ASLEEP IN A CAR, WHILE STOPPED FOR A FEW MINUTES IN TRAFFIC: WOULD NEVER DOZE
HOW LIKELY ARE YOU TO NOD OFF OR FALL ASLEEP WHILE SITTING INACTIVE IN A PUBLIC PLACE: WOULD NEVER DOZE
HOW LIKELY ARE YOU TO NOD OFF OR FALL ASLEEP WHEN YOU ARE A PASSENGER IN A CAR FOR AN HOUR WITHOUT A BREAK: SLIGHT CHANCE OF DOZING

## 2024-02-21 NOTE — PROGRESS NOTES
Does Misbah have a CPAP/Bipap?  Yes   Type of mask: full     [facility]: [facility] [phone]    [email or web address]     [place] Sleep Scale:  5    Sleep Apnea - Follow-up Visit:    Impression/Plan:     Severe Sleep apnea. He is Tolerating PAP well, apnea is well controlled. He continues to benefit from treatment. Leak is elevated, better when he keeps facial hair short. His machine is making some weird sounds. He will discuss this with [facility]. Daytime symptoms are stable.   Supplies ordered    Misbah Floyd will follow up in about 1 year(s).     Fifteen minutes spent with patient, all of which were spent face-to-face counseling, consulting, coordinating plan of care.      CC:  Leandro Hollis,         History of Present Illness:  Chief Complaint   Patient presents with    CPAP Follow Up       Misbah Floyd presents for follow-up of their severe sleep apnea, managed with CPAP.     No specialty comments available.    Do you use a CPAP Machine at home: Yes  Overall, on a scale of 0-10 how would you rate your CPAP (0 poor, 10 great):      What type of mask do you use: Full Face Mask  Is your mask comfortable: Yes  If not, why:      Is your mask leaking: No  If yes, where do you feel it:    How many night per week does the mask leak (0-7):      Do you notice snoring with mask on: Yes  Do you notice gasping arousals with mask on: No  Are you having significant oral or nasal dryness: No  Is the pressure setting comfortable:    If not, why:      What is your typical bedtime: 915  How long does it take you to go to sleep on PAP therapy: 3 min  What time do you typically get out of bed for the day: 240 am  How many hours on average per night are you using PAP therapy: all night  How many hours are you sleeping per night: 5 hours  Do you feel well rested in the morning: Yes      ResMed     BIPAP 16 - 11 cmH2O 30 day usage data:    100% of days with > 4 hours of use.  0/30 days with no use.   Average use 5 hours 55 minutes per day.   95%ile Leak 56.8 L/min.   CPAP 95% pressure  cm.   AHI 0.2 events per hour.            EPWORTH SLEEPINESS SCALE         2/21/2024     1:59 PM    [place] Sleepiness Scale ( DURAN Fontanze  [id number]<br>ESS - [place]/English - Final version - 21 Nov 07 - [facility] [place].)   Sitting and reading Slight chance of dozing   Watching TV Slight chance of dozing   Sitting, inactive in a public place (e.g. a theatre or a meeting) Would never doze   As a passenger in a car for an hour without a break Slight chance of dozing   Lying down to rest in the afternoon when circumstances permit Slight chance of dozing   Sitting and talking to someone Would never doze   Sitting quietly after a lunch without alcohol Slight chance of dozing   In a car, while stopped for a few minutes in traffic Would never doze   [place] Score (MC) 5   [place] Score (Sleep) 5       INSOMNIA SEVERITY INDEX (BEATRICE)          2/21/2024     1:53 PM   Insomnia Severity Index (BEATRICE)   Difficulty falling asleep 0   Difficulty staying asleep 0   Problems waking up too early 0   How SATISFIED/DISSATISFIED are you with your CURRENT sleep pattern? 1   How NOTICEABLE to others do you think your sleep problem is in terms of impairing the quality of your life? 2   How WORRIED/DISTRESSED are you about your current sleep problem? 3   To what extent do you consider your sleep problem to INTERFERE with your daily functioning (e.g. daytime fatigue, mood, ability to function at work/daily chores, concentration, memory, mood, etc.) CURRENTLY? 2   BEATRICE Total Score 8       Guidelines for Scoring/Interpretation:  Total score categories:  0-7 = No clinically significant insomnia   8-14 = Subthreshold insomnia   15-21 = Clinical insomnia (moderate severity)  22-28 = Clinical insomnia (severe)  Used via courtesy of www.[employer].va.gov with permission from Hilario Galloway PhD., [facility]      Past medical/surgical  history, family history, social history, medications and allergies were reviewed.        Problem List:  Patient Active Problem List    Diagnosis Date Noted    Morbid obesity due to excess calories (H) 01/20/2017     Priority: Medium    CITLALLI (obstructive sleep apnea) 01/20/2017     Priority: Medium    Abscess of anal and rectal regions 02/19/2007     Priority: Medium        There were no vitals taken for this visit.

## 2024-03-20 ENCOUNTER — HOSPITAL ENCOUNTER (EMERGENCY)
Facility: CLINIC | Age: 54
Discharge: HOME | End: 2024-03-20
Attending: FAMILY MEDICINE | Admitting: FAMILY MEDICINE
Payer: COMMERCIAL

## 2024-03-20 ENCOUNTER — APPOINTMENT (OUTPATIENT)
Dept: GENERAL RADIOLOGY | Facility: CLINIC | Age: 54
End: 2024-03-20
Attending: FAMILY MEDICINE
Payer: COMMERCIAL

## 2024-03-20 VITALS
RESPIRATION RATE: 18 BRPM | SYSTOLIC BLOOD PRESSURE: 163 MMHG | BODY MASS INDEX: 51.37 KG/M2 | TEMPERATURE: 97.7 F | OXYGEN SATURATION: 100 % | WEIGHT: 315 LBS | DIASTOLIC BLOOD PRESSURE: 108 MMHG | HEART RATE: 66 BPM

## 2024-03-20 DIAGNOSIS — M25.562 ACUTE PAIN OF LEFT KNEE: ICD-10-CM

## 2024-03-20 PROCEDURE — 99283 EMERGENCY DEPT VISIT LOW MDM: CPT | Performed by: FAMILY MEDICINE

## 2024-03-20 PROCEDURE — 73562 X-RAY EXAM OF KNEE 3: CPT | Mod: LT

## 2024-03-20 RX ORDER — IBUPROFEN 600 MG/1
600 TABLET, FILM COATED ORAL EVERY 6 HOURS PRN
Qty: 30 TABLET | Refills: 0 | Status: SHIPPED | OUTPATIENT
Start: 2024-03-20

## 2024-03-20 ASSESSMENT — COLUMBIA-SUICIDE SEVERITY RATING SCALE - C-SSRS
2. HAVE YOU ACTUALLY HAD ANY THOUGHTS OF KILLING YOURSELF IN THE PAST MONTH?: NO
1. IN THE PAST MONTH, HAVE YOU WISHED YOU WERE DEAD OR WISHED YOU COULD GO TO SLEEP AND NOT WAKE UP?: NO
6. HAVE YOU EVER DONE ANYTHING, STARTED TO DO ANYTHING, OR PREPARED TO DO ANYTHING TO END YOUR LIFE?: NO

## 2024-03-20 ASSESSMENT — ACTIVITIES OF DAILY LIVING (ADL): ADLS_ACUITY_SCORE: 35

## 2024-03-20 NOTE — ED PROVIDER NOTES
History     Chief Complaint   Patient presents with    Knee Pain     HPI  Misbah Floyd is a 53 year old male who presents with knee pain.  Patient has been dealing with some problems over the last couple of months.  Patient thinks he might of tweaked it a month ago possibly at work.  And has bothered him off and on since then.  About a week ago he was coming down off of a ladder and twisted and then he started having pretty severe pain.  He has been taking some ibuprofen which helps for a little bit.  Today at work he was walking and went to pivot when he got severe pain in his knee again.  Patient is never any problems with his knee before.  Denies any extremity numbness or tingling.  Denies any obvious trauma.    Allergies:  Allergies   Allergen Reactions    Bees Swelling    Wasp Venom Protein Other (See Comments)    Amoxicillin Rash     Rash & cellulitis - see 3/16/17 encounter    Bactrim [Sulfamethoxazole-Trimethoprim] Rash     Rash & cellulitis - see 3/16/17 encounter - this was prescribed last of 3 abx after amoxicillin, cephalexin - rash didn't get better - only after Benadryl did improve    Keflex [Cephalexin] Rash     Rash & cellulitis - see 3/16/17 encounter       Problem List:    Patient Active Problem List    Diagnosis Date Noted    Morbid obesity due to excess calories (H) 01/20/2017     Priority: Medium    CITLALLI (obstructive sleep apnea) 01/20/2017     Priority: Medium    Abscess of anal and rectal regions 02/19/2007     Priority: Medium        Past Medical History:    No past medical history on file.    Past Surgical History:    Past Surgical History:   Procedure Laterality Date    EXCISE MASS HIP Right 2/24/2017    Procedure: EXCISE MASS HIP;  Surgeon: Solis Lopes MD;  Location:  OR     CYSTOURETHROSCOPY  04/21/2006    Stone basket extraction. Left double-J stent placemnt.       Family History:    No family history on file.    Social History:  Marital Status:   [2]  Social History      Tobacco Use    Smoking status: Never    Smokeless tobacco: Never   Vaping Use    Vaping Use: Never used   Substance Use Topics    Alcohol use: No     Alcohol/week: 0.0 standard drinks of alcohol    Drug use: No        Medications:    ibuprofen (ADVIL/MOTRIN) 600 MG tablet          Review of Systems   All other systems reviewed and are negative.      Physical Exam   BP: (!) 163/108  Pulse: 66  Temp: 97.7  F (36.5  C)  Resp: 18  Weight: 148.8 kg (328 lb)  SpO2: 100 %      Physical Exam  Vitals and nursing note reviewed.   Constitutional:       General: He is not in acute distress.     Appearance: Normal appearance. He is not ill-appearing.   Musculoskeletal:      Left knee: No swelling, deformity, effusion, erythema, ecchymosis, lacerations, bony tenderness or crepitus. Normal range of motion. No tenderness. Abnormal meniscus. Normal alignment and normal patellar mobility.   Neurological:      Mental Status: He is alert.         ED Course        Procedures           Results for orders placed or performed during the hospital encounter of 03/20/24 (from the past 24 hour(s))   XR Knee Left 3 Views    Narrative    KNEE THREE VIEWS LEFT  3/20/2024 12:24 PM     HISTORY: right knee pain  COMPARISON: None.      Impression    IMPRESSION: No acute fracture or malalignment. No significant  degenerative changes. Small knee joint effusion. Mild soft tissue  swelling.    ESPERANZA CORTEZ MD         SYSTEM ID:  [id number]       Medications - No data to display  X-ray of the knee does show a small joint effusion, no obvious fractures.  I am a little suspicious the patient might have a meniscus injury.  Especially the way the patient has been dealing with these off and on issues of pain.  At this point we will order an outpatient MRI of her knee and will have patient follow-up with sports medicine after this.  Will have patient use high-dose of ibuprofen to help with the pain.  Patient was given an Ace wrap for the knee and told  to use a neoprene sleeve to keep some compression on the knee.    Assessments & Plan (with Medical Decision Making)  Left knee pain     I have reviewed the nursing notes.    I have reviewed the findings, diagnosis, plan and need for follow up with the patient.      New Prescriptions    IBUPROFEN (ADVIL/MOTRIN) 600 MG TABLET    Take 1 tablet (600 mg) by mouth every 6 hours as needed for moderate pain       Final diagnoses:   Acute pain of left knee       3/20/2024   [facility] EMERGENCY DEPT       Lane Ayala MD  03/20/24 0065

## 2024-03-20 NOTE — Clinical Note
Misbah Floyd was seen and treated in our emergency department on 3/20/2024.  He may return to work on 03/23/2024.       If you have any questions or concerns, please don't hesitate to call.      Lane Ayala MD

## 2024-03-20 NOTE — ED TRIAGE NOTES
Pt history of knee issues, tweaked it this past weekend and has stabbing pain to the frontal part. Pain 9/10.  Took 400mg Ibuprofen this AM.     Triage Assessment (Adult)       Row Name 03/20/24 1150          Triage Assessment    Airway WDL WDL        Respiratory WDL    Respiratory WDL WDL        Cognitive/Neuro/Behavioral WDL    Cognitive/Neuro/Behavioral WDL WDL

## 2024-03-20 NOTE — DISCHARGE INSTRUCTIONS
1.  Continue to have some type of compression on your knee either an Ace wrap or a neoprene sleeve.  2.  Follow-up with sports medicine after your MRI.

## 2024-04-01 ENCOUNTER — HOSPITAL ENCOUNTER (OUTPATIENT)
Dept: MRI IMAGING | Facility: CLINIC | Age: 54
Discharge: HOME | End: 2024-04-01
Attending: FAMILY MEDICINE | Admitting: FAMILY MEDICINE
Payer: COMMERCIAL

## 2024-04-01 DIAGNOSIS — M25.562 ACUTE PAIN OF LEFT KNEE: ICD-10-CM

## 2024-04-01 PROCEDURE — 73721 MRI JNT OF LWR EXTRE W/O DYE: CPT | Mod: 26 | Performed by: RADIOLOGY

## 2024-04-01 PROCEDURE — 73721 MRI JNT OF LWR EXTRE W/O DYE: CPT | Mod: LT

## 2024-04-04 NOTE — PROGRESS NOTES
Misbah Floyd  :  1970  DOS: 4/10/2024  MRN: [id number]  PCP: Leandro Hollis    Sports Medicine Clinic Visit      HPI  Misbah Floyd is a 53 year old male who is seen in consultation at the request of  Lane Ayala M.D. presenting with left knee pain.    - Mechanism of Injury:    - Acute on chronic knee pain. Spun wrong 5 months ago, causing left knee pain. One month ago, was coming down off of a ladder and twisted his left knee, increasing pain.   - Pertinent history and prior evaluations:    - 3/20/2024 ED Visit: Left knee pain. Pain with pivoting. Ibuprofen tried. Denied numbness and tingling.  Recommended MRI, prescription grade and ibuprofen, Ace wrap, compression sleeve, RICE protocol.    - XR L knee 3/20/2024 shows normal anatomic alignment, no fractures or dislocations, there is a small knee joint effusion.    - Left knee MRI 2024:   IMPRESSION:  1. Moderate size left knee joint effusion. Small popliteal cyst with  fluid leaking along the medial gastrocnemius muscle.    2. Complex multidirectional tearing of the left knee medial meniscus  with a horizontal component and free edge tearing     3. Diffuse thinning of the articular cartilage in the medial  femorotibial joint compartment with reactive subchondral bone marrow  edema along the far medial aspect of the medial tibial plateau.    4. The anterior and posterior cruciate ligament, medial and lateral  supporting structures, and lateral meniscus are intact.    - Hx of a right knee injury at work in , given an injection and RICE protocol by Dr. Carrasco.  This was helpful for the right knee.    - Pain Character:    - Location:  left medial knee  - Character:  aching pain  - Duration:  acute on chronic. 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    No past medical history on file.  Past Surgical History:   Procedure Laterality Date    EXCISE MASS HIP Right 2/24/2017    Procedure: EXCISE MASS HIP;  Surgeon: Solis Lopes MD;  Location:  OR     CYSTOURETHROSCOPY  04/21/2006    Stone basket extraction. Left double-J stent placemnt.     No family history on file.      Objective  BP (!) 163/108   Wt 148.8 kg (328 lb)   BMI 51.37 kg/m      General: healthy, alert and in no acute distress.    HEENT: no scleral icterus or conjunctival erythema.   Skin: no suspicious lesions or rash. No jaundice.   CV: regular rhythm by palpation, 2+ distal pulses.  Resp: normal respiratory effort without conversational dyspnea.   Psych: normal mood and affect.    Gait: nonantalgic, appropriate coordination and balance.     Neuro:        - Sensation to light touch:    - Intact throughout the BLE including all peripheral nerve distributions.     MSK - Knee:       - Inspection:    - No significant effusion, erythema, warmth, ecchymosis, lesion.        - ROM:    - Limited in knee extension slightly by pain.       - Palpation:    - TTP at the medial joint line, medial gastroc  - NTTP elsewhere.        - Strength:  (*antalgic)  - Hip Flexion  5    - Hip Abduction 5    - Hip Adduction 5   - Knee Flexion  5   - Knee Extension 5*   - Dorsiflexion  5   - Plantarflexion 5   - Ext. Erlin. Longus 5   - Inversion  5   - Eversion  5        - Special  tests:        - Lachman:  Neg        - A/P drawer:  Neg        - Pivot shift:  Neg    - Reynaldo: Positive for medial compartment pain     - Varus stress:  Neg for laxity or pain     - Valgus stress:  Neg for laxity or pain    - Patellar grind:  Neg    - Thessaly: Positive for medial compartment pain   -Cristiane: Neg       - Functional tests:   - Deep squat:  Able to perform with increasing medial knee pain, no valgus deformity, no imbalance      Radiology  I independently reviewed the available relevant imaging in the chart with my interpretations as above in HPI.       Procedure  Large Joint Injection/Arthocentesis: L knee joint    Date/Time: 4/10/2024 3:15 PM    Performed by: Russel Roldan DO  Authorized by: Russel Roldan DO    Indications:  Osteoarthritis  Needle Size:  22 G  Guidance: ultrasound    Approach:  Anterolateral  Location:  Knee      Medications:  40 mg triamcinolone 40 MG/ML; 2 mL lidocaine 1 %; 2 mL BUPivacaine 0.5 %  Aspirate amount (mL):  15  Aspirate:  Clear and yellow  Outcome:  Tolerated well, no immediate complications  Procedure discussed: discussed risks, benefits, and alternatives    Consent Given by:  Patient  Prep: patient was prepped and draped in usual sterile fashion     Ultrasound images of procedure were permanently stored.       Ultrasound Guided Intra-articular Knee Aspiration + Injection   The knee was prepped and draped in a sterile manner.  Ultrasound identification of the patella, suprapatellar pouch, quadriceps tendon and femur in both long and short axis was obtained. The probe was placed in short axis to the femur.  A 1.5 inch 25-gauge needle was used to inject 3 mL of local anesthetic.  Then a 1.5 inch 18 gauge needle was placed under ultrasound guidance into the superior knee joint using a lateral approach. Approximately 15 mL of clear yellow synovial fluid was aspirated from the knee joint. Then, the aspiration syringe was exchanged for the injection syringe without  removing the needle. Then, a mixture of 2 mL of 1% lidocaine, 2 mL of 0.5% bupivacaine and 1 ml kenalog (40mg/ml) was injected without difficulty. The needle was removed and there was good hemostasis without complications.  Patient tolerated procedure well.  There was ultrasound documentation of needle placement and injection.         Assessment  1. Complex tear of medial meniscus of left knee as current injury, initial encounter    2. Primary osteoarthritis of left knee        Plan  Misbah Floyd is a pleasant 53 year old male that presents with acute on chronic left knee pain.  He describes pain in the medial knee that is worse after multiple pivoting exacerbations.  Some mild clicking of the medial compartment without mechanical locking symptoms.  He was previously evaluated and there was concern for a possible medial meniscus tear, an MRI was ordered.  MRI confirms a complex multidirectional tear of the medial meniscus and moderate to advanced chondromalacia of the medial compartment with a moderate joint effusion and popliteal cyst with evidence of leaking into the medial gastroc, intact stabilizing ligaments. History and physical exam appear most consistent with a complex medial meniscus tear and primary osteoarthritis of the left knee.  He is not currently experiencing significant mechanical locking symptoms and has actually been managing his knee pain fairly well and continues to work with pain but without major instability.     We discussed the nature of the condition and available treatment options, and mutually agreed upon the following plan:    - Imaging:          - Reviewed and independently interpreted the relevant imaging in the chart, including any imaging ordered for today's clinic.  - Reviewed results and images with patient.   - Medications:          - Discussed pharmacologic options for pain relief.   - May use NSAIDs (Ibuprofen, Naproxen) or Acetaminophen (Tylenol) as needed for pain control.    - Do not take these if previously advised to avoid them for other medical conditions.  - May also use topical medications such as lidocaine, IcyHot, BioFreeze, or Voltaren gel as needed for pain control.    - Voltaren gel is an anti-inflammatory cream that may be used up to 4 times per day over the painful area.   - Injections:          - Discussed possible injection options and alternatives.    - Injection options include: Intra-articular left knee joint aspiration and corticosteroid injection.  May also consider viscosupplementation or PRP in the future if needed.  - Performed an ultrasound-guided aspiration and corticosteroid injection of the left intra-articular knee joint today in clinic. Patient tolerated the procedure well without complications.     - Post-procedure instructions:    - Keep the injection site clean and dry.   - Do not submerge the injection site for 24 hours (no baths, pools). Showers are ok.   - Rest the area for 24-48 hours before resuming normal activities. Avoid overexerting the area for the first few weeks.   - It may take 2-3 days to start noticing the effects of the injection and up to 3-4 weeks to feel significant benefits.   - Therapy:          - Discussed the benefits of therapy vs home exercise program for optimization of range of motion, flexibility, strength, stability and function.   - Preference is for a home exercise program.   - Home Exercise Program given today in clinic and recommendation given to perform HEP daily and after exacerbations.  - Modalities:          - May use ice, heat, massage or other modalities as needed.   - Bracing:          - Discussed bracing options and recommend using a hinged knee stability brace for comfort and stability as needed with ambulation and prolonged standing, especially at work.  He is currently managing well with an Ace wrap and would prefer to go without bracing.  He may request a brace at any time by contacting the clinic.    -  Options presented in clinic and choice given to take our brace from clinic or purchase an equivalent brace from an outside source.   - Surgery:          - Discussed non-operative and operative treatment options for the patient's condition, including medial meniscus repair and arthroplasty.  His medial meniscus tear is complex and multidirectional, not involving the posterior root.  We discussed that based on his arthritis of the knee, primary repair of the meniscus may ultimately result in continued pain of his knee due to arthritis.  He is managing quite well at this time without significant mechanical locking symptoms, and would prefer to avoid surgery unless necessary.  We will continue with conservative care and consider surgery in the future for persistent symptoms.  - Activity:          - Encouraged to remain active and participate in regular activities as symptoms allow.   Avoid or modify exacerbating activities as needed.  Offered a work letter for restrictions, but he feels this would be unnecessary.  He may request a work letter at any time if symptoms are preventing him from completing his work duties.  - Follow up:          - As needed for re-evaluation and update to treatment plan.  - May follow up sooner for new/worsening symptoms.  - May contact clinic by phone or MyChart for questions or concerns.       Russel Roldan DO, MELISAM  [facility]  [facility] - Department of Orthopedic Surgery       Disclaimer:  This note was prepared and written using Dragon Medical dictation software. As a result, there may be errors in the script that have gone undetected. Please consider this when interpreting the information in this note.

## 2024-04-10 ENCOUNTER — OFFICE VISIT (OUTPATIENT)
Dept: ORTHOPEDICS | Facility: CLINIC | Age: 54
End: 2024-04-10
Payer: COMMERCIAL

## 2024-04-10 VITALS — BODY MASS INDEX: 51.37 KG/M2 | SYSTOLIC BLOOD PRESSURE: 163 MMHG | DIASTOLIC BLOOD PRESSURE: 108 MMHG | WEIGHT: 315 LBS

## 2024-04-10 DIAGNOSIS — S83.232A COMPLEX TEAR OF MEDIAL MENISCUS OF LEFT KNEE AS CURRENT INJURY, INITIAL ENCOUNTER: Primary | ICD-10-CM

## 2024-04-10 DIAGNOSIS — M17.12 PRIMARY OSTEOARTHRITIS OF LEFT KNEE: ICD-10-CM

## 2024-04-10 PROCEDURE — 99214 OFFICE O/P EST MOD 30 MIN: CPT | Mod: 25 | Performed by: STUDENT IN AN ORGANIZED HEALTH CARE EDUCATION/TRAINING PROGRAM

## 2024-04-10 PROCEDURE — 20611 DRAIN/INJ JOINT/BURSA W/US: CPT | Mod: LT | Performed by: STUDENT IN AN ORGANIZED HEALTH CARE EDUCATION/TRAINING PROGRAM

## 2024-04-10 RX ADMIN — LIDOCAINE HYDROCHLORIDE 2 ML: 10 INJECTION, SOLUTION INFILTRATION; PERINEURAL at 15:15

## 2024-04-10 RX ADMIN — TRIAMCINOLONE ACETONIDE 40 MG: 40 INJECTION, SUSPENSION INTRA-ARTICULAR; INTRAMUSCULAR at 15:15

## 2024-04-10 RX ADMIN — BUPIVACAINE HYDROCHLORIDE 2 ML: 5 INJECTION, SOLUTION PERINEURAL at 15:15

## 2024-04-10 ASSESSMENT — PAIN SCALES - GENERAL: PAINLEVEL: MILD PAIN (3)

## 2024-04-10 NOTE — LETTER
4/10/2024         RE: Misbah Floyd  [address] [facility] [address]        Dear Colleague,    Thank you for referring your patient, Misbah Floyd, to the [facility] [place]. Please see a copy of my visit note below.    Misbah Floyd  :  1970  DOS: 4/10/2024  MRN: [id number]  PCP: Leandro Hollis    Sports Medicine Clinic Visit      HPI  Misbah Floyd is a 53 year old male who is seen in consultation at the request of  Lane Ayala M.D. presenting with left knee pain.    - Mechanism of Injury:    - Acute on chronic knee pain. Spun wrong 5 months ago, causing left knee pain. One month ago, was coming down off of a ladder and twisted his left knee, increasing pain.   - Pertinent history and prior evaluations:    - 3/20/2024 ED Visit: Left knee pain. Pain with pivoting. Ibuprofen tried. Denied numbness and tingling.  Recommended MRI, prescription grade and ibuprofen, Ace wrap, compression sleeve, RICE protocol.    - XR L knee 3/20/2024 shows normal anatomic alignment, no fractures or dislocations, there is a small knee joint effusion.    - Left knee MRI 2024:   IMPRESSION:  1. Moderate size left knee joint effusion. Small popliteal cyst with  fluid leaking along the medial gastrocnemius muscle.    2. Complex multidirectional tearing of the left knee medial meniscus  with a horizontal component and free edge tearing     3. Diffuse thinning of the articular cartilage in the medial  femorotibial joint compartment with reactive subchondral bone marrow  edema along the far medial aspect of the medial tibial plateau.    4. The anterior and posterior cruciate ligament, medial and lateral  supporting structures, and lateral meniscus are intact.    - Hx of a right knee injury at work in , given an injection and RICE protocol by Dr. Carrasco.  This was helpful for the right knee.    - Pain Character:    - Location:  left medial knee  - Character:   aching pain  - Duration:  acute on chronic. 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    No past medical history on file.  Past Surgical History:   Procedure Laterality Date     EXCISE MASS HIP Right 2/24/2017    Procedure: EXCISE MASS HIP;  Surgeon: Solis Lopes MD;  Location:  OR      CYSTOURETHROSCOPY  04/21/2006    Stone basket extraction. Left double-J stent placemnt.     No family history on file.      Objective  BP (!) 163/108   Wt 148.8 kg (328 lb)   BMI 51.37 kg/m      General: healthy, alert and in no acute distress.    HEENT: no scleral icterus or conjunctival erythema.   Skin: no suspicious lesions or rash. No jaundice.   CV: regular rhythm by palpation, 2+ distal pulses.  Resp: normal respiratory effort without conversational dyspnea.   Psych: normal mood and affect.    Gait: nonantalgic, appropriate coordination and balance.     Neuro:        - Sensation to light touch:    - Intact throughout the BLE including all peripheral nerve distributions.     MSK - Knee:       - Inspection:    - No significant effusion, erythema, warmth, ecchymosis, lesion.        - ROM:    - Limited in knee extension slightly by pain.       - Palpation:    - TTP at the medial joint line, medial gastroc  - NTTP  elsewhere.        - Strength:  (*antalgic)  - Hip Flexion  5    - Hip Abduction 5    - Hip Adduction 5   - Knee Flexion  5   - Knee Extension 5*   - Dorsiflexion  5   - Plantarflexion 5   - Ext. Erlin. Longus 5   - Inversion  5   - Eversion  5        - Special tests:        - Lachman:  Neg        - A/P drawer:  Neg        - Pivot shift:  Neg    - Reynaldo: Positive for medial compartment pain     - Varus stress:  Neg for laxity or pain     - Valgus stress:  Neg for laxity or pain    - Patellar grind:  Neg    - Thessaly: Positive for medial compartment pain   -Cristiane: Neg       - Functional tests:   - Deep squat:  Able to perform with increasing medial knee pain, no valgus deformity, no imbalance      Radiology  I independently reviewed the available relevant imaging in the chart with my interpretations as above in HPI.       Procedure  Large Joint Injection/Arthocentesis: L knee joint    Date/Time: 4/10/2024 3:15 PM    Performed by: Russel Roldan DO  Authorized by: Russel Roldan DO    Indications:  Osteoarthritis  Needle Size:  22 G  Guidance: ultrasound    Approach:  Anterolateral  Location:  Knee      Medications:  40 mg triamcinolone 40 MG/ML; 2 mL lidocaine 1 %; 2 mL BUPivacaine 0.5 %  Aspirate amount (mL):  15  Aspirate:  Clear and yellow  Outcome:  Tolerated well, no immediate complications  Procedure discussed: discussed risks, benefits, and alternatives    Consent Given by:  Patient  Prep: patient was prepped and draped in usual sterile fashion     Ultrasound images of procedure were permanently stored.       Ultrasound Guided Intra-articular Knee Aspiration + Injection   The knee was prepped and draped in a sterile manner.  Ultrasound identification of the patella, suprapatellar pouch, quadriceps tendon and femur in both long and short axis was obtained. The probe was placed in short axis to the femur.  A 1.5 inch 25-gauge needle was used to inject 3 mL of local anesthetic.  Then a 1.5 inch 18 gauge needle  was placed under ultrasound guidance into the superior knee joint using a lateral approach. Approximately 15 mL of clear yellow synovial fluid was aspirated from the knee joint. Then, the aspiration syringe was exchanged for the injection syringe without removing the needle. Then, a mixture of 2 mL of 1% lidocaine, 2 mL of 0.5% bupivacaine and 1 ml kenalog (40mg/ml) was injected without difficulty. The needle was removed and there was good hemostasis without complications.  Patient tolerated procedure well.  There was ultrasound documentation of needle placement and injection.         Assessment  1. Complex tear of medial meniscus of left knee as current injury, initial encounter    2. Primary osteoarthritis of left knee        Plan  Misbah Floyd is a pleasant 53 year old male that presents with acute on chronic left knee pain.  He describes pain in the medial knee that is worse after multiple pivoting exacerbations.  Some mild clicking of the medial compartment without mechanical locking symptoms.  He was previously evaluated and there was concern for a possible medial meniscus tear, an MRI was ordered.  MRI confirms a complex multidirectional tear of the medial meniscus and moderate to advanced chondromalacia of the medial compartment with a moderate joint effusion and popliteal cyst with evidence of leaking into the medial gastroc, intact stabilizing ligaments. History and physical exam appear most consistent with a complex medial meniscus tear and primary osteoarthritis of the left knee.  He is not currently experiencing significant mechanical locking symptoms and has actually been managing his knee pain fairly well and continues to work with pain but without major instability.     We discussed the nature of the condition and available treatment options, and mutually agreed upon the following plan:    - Imaging:          - Reviewed and independently interpreted the relevant imaging in the chart, including any  imaging ordered for today's clinic.  - Reviewed results and images with patient.   - Medications:          - Discussed pharmacologic options for pain relief.   - May use NSAIDs (Ibuprofen, Naproxen) or Acetaminophen (Tylenol) as needed for pain control.   - Do not take these if previously advised to avoid them for other medical conditions.  - May also use topical medications such as lidocaine, IcyHot, BioFreeze, or Voltaren gel as needed for pain control.    - Voltaren gel is an anti-inflammatory cream that may be used up to 4 times per day over the painful area.   - Injections:          - Discussed possible injection options and alternatives.    - Injection options include: Intra-articular left knee joint aspiration and corticosteroid injection.  May also consider viscosupplementation or PRP in the future if needed.  - Performed an ultrasound-guided aspiration and corticosteroid injection of the left intra-articular knee joint today in clinic. Patient tolerated the procedure well without complications.     - Post-procedure instructions:    - Keep the injection site clean and dry.   - Do not submerge the injection site for 24 hours (no baths, pools). Showers are ok.   - Rest the area for 24-48 hours before resuming normal activities. Avoid overexerting the area for the first few weeks.   - It may take 2-3 days to start noticing the effects of the injection and up to 3-4 weeks to feel significant benefits.   - Therapy:          - Discussed the benefits of therapy vs home exercise program for optimization of range of motion, flexibility, strength, stability and function.   - Preference is for a home exercise program.   - Home Exercise Program given today in clinic and recommendation given to perform HEP daily and after exacerbations.  - Modalities:          - May use ice, heat, massage or other modalities as needed.   - Bracing:          - Discussed bracing options and recommend using a hinged knee stability brace for  comfort and stability as needed with ambulation and prolonged standing, especially at work.  He is currently managing well with an Ace wrap and would prefer to go without bracing.  He may request a brace at any time by contacting the clinic.    - Options presented in clinic and choice given to take our brace from clinic or purchase an equivalent brace from an outside source.   - Surgery:          - Discussed non-operative and operative treatment options for the patient's condition, including medial meniscus repair and arthroplasty.  His medial meniscus tear is complex and multidirectional, not involving the posterior root.  We discussed that based on his arthritis of the knee, primary repair of the meniscus may ultimately result in continued pain of his knee due to arthritis.  He is managing quite well at this time without significant mechanical locking symptoms, and would prefer to avoid surgery unless necessary.  We will continue with conservative care and consider surgery in the future for persistent symptoms.  - Activity:          - Encouraged to remain active and participate in regular activities as symptoms allow.   Avoid or modify exacerbating activities as needed.  Offered a work letter for restrictions, but he feels this would be unnecessary.  He may request a work letter at any time if symptoms are preventing him from completing his work duties.  - Follow up:          - As needed for re-evaluation and update to treatment plan.  - May follow up sooner for new/worsening symptoms.  - May contact clinic by phone or MyChart for questions or concerns.       Russel Roldan DO, JAYASHREE  [facility]  [facility] - Department of Orthopedic Surgery       Disclaimer:  This note was prepared and written using Dragon Medical dictation software. As a result, there may be errors in the script that have gone undetected. Please consider this when interpreting the information in  this note.       Again, thank you for allowing me to participate in the care of your patient.        Sincerely,        Russel Roldan, DO

## 2024-04-13 RX ORDER — BUPIVACAINE HYDROCHLORIDE 5 MG/ML
2 INJECTION, SOLUTION PERINEURAL
Status: SHIPPED | OUTPATIENT
Start: 2024-04-10

## 2024-04-13 RX ORDER — LIDOCAINE HYDROCHLORIDE 10 MG/ML
2 INJECTION, SOLUTION INFILTRATION; PERINEURAL
Status: SHIPPED | OUTPATIENT
Start: 2024-04-10

## 2024-04-13 RX ORDER — TRIAMCINOLONE ACETONIDE 40 MG/ML
40 INJECTION, SUSPENSION INTRA-ARTICULAR; INTRAMUSCULAR
Status: SHIPPED | OUTPATIENT
Start: 2024-04-10

## 2025-02-11 ENCOUNTER — TELEPHONE (OUTPATIENT)
Dept: SLEEP MEDICINE | Facility: CLINIC | Age: 55
End: 2025-02-11
Payer: COMMERCIAL

## 2025-02-11 DIAGNOSIS — G47.33 OSA (OBSTRUCTIVE SLEEP APNEA): Primary | ICD-10-CM

## 2025-02-11 NOTE — TELEPHONE ENCOUNTER
Order/Referral Request ATTENTIION DR. DYER, NEEDS ORDER FOR SUPPLIES LISTED BELOW     Who is requesting: Patient made appointment first available with Dr. Dyer in June at . Last appt with Dr. Goldsmith 10/28/22. Patient needs filters for his C-pap machine and the head gear elastic strap size Medium per patient double check the notes     Orders being requested:See above     Reason service is needed/diagnosis: His elastic strap broke and he needs filters for his machine. He had an appointment set up previously for his annual but was cancelled because [facility] is not closed, and [facility] is not there now. Had to schedule with a different provider     When are orders needed by: ASAP    Has this been discussed with Provider: No    Does patient have a preference on a Group/Provider/Facility? Mail supplies to his home address on file     Does patient have an appointment scheduled?: Yes     Where to send orders: Place orders within Epic    Okay to leave a detailed message?: Yes at Cell number on file:    Telephone Information:   Mobile [phone]

## 2025-02-12 NOTE — TELEPHONE ENCOUNTER
Last ov 2/21/24  Next ov 6/26/25    Patient requesting updated order for CPAP supplies prior to appointment. Order pended and routed to provider for consideration.    JASMIN AVENDANO RN  [facility]

## 2025-04-21 ENCOUNTER — OFFICE VISIT (OUTPATIENT)
Dept: FAMILY MEDICINE | Facility: CLINIC | Age: 55
End: 2025-04-21
Payer: COMMERCIAL

## 2025-04-21 ENCOUNTER — TELEPHONE (OUTPATIENT)
Dept: FAMILY MEDICINE | Facility: CLINIC | Age: 55
End: 2025-04-21

## 2025-04-21 VITALS
DIASTOLIC BLOOD PRESSURE: 80 MMHG | TEMPERATURE: 98.3 F | WEIGHT: 315 LBS | HEIGHT: 68 IN | OXYGEN SATURATION: 99 % | RESPIRATION RATE: 12 BRPM | BODY MASS INDEX: 47.74 KG/M2 | HEART RATE: 64 BPM | SYSTOLIC BLOOD PRESSURE: 130 MMHG

## 2025-04-21 DIAGNOSIS — B35.1 ONYCHOMYCOSIS: ICD-10-CM

## 2025-04-21 DIAGNOSIS — R60.0 BILATERAL LOWER EXTREMITY EDEMA: Primary | ICD-10-CM

## 2025-04-21 DIAGNOSIS — E66.01 MORBID OBESITY DUE TO EXCESS CALORIES (H): ICD-10-CM

## 2025-04-21 LAB
ANION GAP SERPL CALCULATED.3IONS-SCNC: 5 MMOL/L (ref 7–15)
BUN SERPL-MCNC: 18.7 MG/DL (ref 6–20)
CALCIUM SERPL-MCNC: 9.5 MG/DL (ref 8.8–10.4)
CHLORIDE SERPL-SCNC: 103 MMOL/L (ref 98–107)
CREAT SERPL-MCNC: 0.93 MG/DL (ref 0.67–1.17)
EGFRCR SERPLBLD CKD-EPI 2021: >90 ML/MIN/1.73M2
GLUCOSE SERPL-MCNC: 97 MG/DL (ref 70–99)
HCO3 SERPL-SCNC: 28 MMOL/L (ref 22–29)
POTASSIUM SERPL-SCNC: 4.1 MMOL/L (ref 3.4–5.3)
SODIUM SERPL-SCNC: 136 MMOL/L (ref 135–145)

## 2025-04-21 PROCEDURE — 99203 OFFICE O/P NEW LOW 30 MIN: CPT | Performed by: NURSE PRACTITIONER

## 2025-04-21 PROCEDURE — G2211 COMPLEX E/M VISIT ADD ON: HCPCS | Performed by: NURSE PRACTITIONER

## 2025-04-21 PROCEDURE — 3079F DIAST BP 80-89 MM HG: CPT | Performed by: NURSE PRACTITIONER

## 2025-04-21 PROCEDURE — 3075F SYST BP GE 130 - 139MM HG: CPT | Performed by: NURSE PRACTITIONER

## 2025-04-21 PROCEDURE — 80048 BASIC METABOLIC PNL TOTAL CA: CPT | Performed by: NURSE PRACTITIONER

## 2025-04-21 PROCEDURE — 36415 COLL VENOUS BLD VENIPUNCTURE: CPT | Performed by: NURSE PRACTITIONER

## 2025-04-21 RX ORDER — CICLOPIROX 80 MG/ML
SOLUTION TOPICAL
Qty: 6.6 ML | Refills: 11 | Status: SHIPPED | OUTPATIENT
Start: 2025-04-21

## 2025-04-21 RX ORDER — FUROSEMIDE 20 MG/1
20 TABLET ORAL DAILY
Qty: 30 TABLET | Refills: 0 | Status: SHIPPED | OUTPATIENT
Start: 2025-04-21

## 2025-04-21 NOTE — TELEPHONE ENCOUNTER
----- Message from Indira Aguirre sent at 4/21/2025  9:55 AM CDT -----  Please let Jeremy know his lab work today was normal.

## 2025-04-21 NOTE — PROGRESS NOTES
"  Assessment & Plan     Bilateral lower extremity edema  Significant bilateral pitting edema in lower extremities. No evidence of cellulitis on exam. Discussed this is likely multifactorial however suspect related to venous insuffiencey. Discussed supportive measures with compression stockings, extremity elevation and weight loss. Discussed starting furosemide for retained fluid which he is in agreement with, side effects reviewed. Baseline labs completed today. He will follow-up with his PCP in 1-2 weeks for recheck of his labs and swelling. Consider venous competency study in the future if needed.   - furosemide (LASIX) 20 MG tablet; Take 1 tablet (20 mg) by mouth daily.  - Basic metabolic panel  (Ca, Cl, CO2, Creat, Gluc, K, Na, BUN); Future  - Basic metabolic panel  (Ca, Cl, CO2, Creat, Gluc, K, Na, BUN)    Onychomycosis  Discussed topical verses oral therapy. Will start with topical, discussed typical length of treatment and home measures for disease management and prevention of spreading.   - ciclopirox (PENLAC) 8 % external solution; Apply to adjacent skin and affected nails daily.  Remove with alcohol every 7 days, then repeat.    Morbid obesity due to excess calories (H)  Likely contributing to his bilateral lower extremity edema, inhibiting proper venous return.   BMI  Estimated body mass index is 51.12 kg/m  as calculated from the following:    Height as of this encounter: 1.727 m (5' 8\").    Weight as of this encounter: 152.5 kg (336 lb 3.2 oz).   Weight management plan: Discussed healthy diet and exercise guidelines Patient was referred to their PCP to discuss a diet and exercise plan.    I explained my diagnostic considerations and recommendations to the patient, who voiced understanding and agreement with the assessment and treatment plan. All questions were answered to patient's apparent satisfaction. We discussed potential side effects of any prescribed or recommended therapies, as well as " expectations for response to treatments and importance of lifestyle measures that may improve symptoms. Patient was advised to contact our office if there are new symptoms or no improvement or worsening of conditions or symptoms.    The longitudinal plan of care for the diagnosis(es)/condition(s) as documented were addressed during this visit. Due to the added complexity in care, I will continue to support Jeremy in the subsequent management and with ongoing continuity of care.      Subjective   Jeremy is a 54 year old, presenting for the following health issues:  Leg Swelling (Left leg- x 1 year- increasingly worse. )      4/21/2025     7:42 AM   Additional Questions   Roomed by Tali     History of Present Illness       Reason for visit:  Left leg swelling   He is taking medications regularly.      Jeremy presents to clinic with concerns of swelling in his bilateral lower extremities. This has been ongoing for the past several months, however has worsened in the past week. He has some dimpling in the skin of his bilateral lower legs, when itched/scratched, he notices clear, water like drainage.  He notices pain in his legs and into his heels while at work. He stands on an anti-fatigue mat but by the end of the day will have pain in his feet/ankles, up to his knees. He has a history of knee pain and meniscus tear. He has seen Orthopedics in the past related to this. He notes this has worsened and now has pain whenever he is bending on his knees in particular. He denies any history of hypertension. He denies any chest pain, palpitations or shortness of breath. He has not been wearing compression stockings, he denies any known history of varicose veins.     Jeremy also has concerns of his bilateral toenails. He has been noticing, yellowing, thickening and breakage of his toenails. His symptoms are worst in his bilateral big toes, but he has been noticing spreading to his additional toes as well. He denies any pain. He has not  "been applying anything topically.     Patient Active Problem List   Diagnosis    Abscess of anal and rectal regions    Morbid obesity due to excess calories (H)    CITLALLI (obstructive sleep apnea)     Current Outpatient Medications   Medication Sig Dispense Refill    ciclopirox (PENLAC) 8 % external solution Apply to adjacent skin and affected nails daily.  Remove with alcohol every 7 days, then repeat. 6.6 mL 11    furosemide (LASIX) 20 MG tablet Take 1 tablet (20 mg) by mouth daily. 30 tablet 0    ibuprofen (ADVIL/MOTRIN) 600 MG tablet Take 1 tablet (600 mg) by mouth every 6 hours as needed for moderate pain 30 tablet 0     No current facility-administered medications for this visit.                 Review of Systems  Constitutional, HEENT, cardiovascular, pulmonary, gi and gu systems are negative, except as otherwise noted.      Objective    /80   Pulse 64   Temp 98.3  F (36.8  C) (Temporal)   Resp 12   Ht 1.727 m (5' 8\")   Wt (!) 152.5 kg (336 lb 3.2 oz)   SpO2 99%   BMI 51.12 kg/m    Body mass index is 51.12 kg/m .  Physical Exam  Constitutional:       General: He is not in acute distress.     Appearance: Normal appearance.   HENT:      Head: Normocephalic and atraumatic.      Mouth/Throat:      Mouth: Mucous membranes are moist.      Pharynx: Oropharynx is clear.   Eyes:      Extraocular Movements: Extraocular movements intact.      Conjunctiva/sclera: Conjunctivae normal.   Cardiovascular:      Rate and Rhythm: Normal rate and regular rhythm.      Heart sounds: Normal heart sounds.   Pulmonary:      Effort: Pulmonary effort is normal.      Breath sounds: Normal breath sounds.   Musculoskeletal:      Cervical back: Neck supple.      Right lower le+ Pitting Edema present.      Left lower le+ Pitting Edema present.   Feet:      Right foot:      Toenail Condition: Right toenails are abnormally thick. Fungal disease present.     Left foot:      Toenail Condition: Left toenails are abnormally " thick. Fungal disease present.  Lymphadenopathy:      Cervical: No cervical adenopathy.   Skin:     General: Skin is warm and dry.      Capillary Refill: Capillary refill takes less than 2 seconds.   Neurological:      Mental Status: He is alert and oriented to person, place, and time. Mental status is at baseline.   Psychiatric:         Mood and Affect: Mood normal.         Behavior: Behavior normal.            Results for orders placed or performed in visit on 04/21/25 (from the past 24 hours)   Basic metabolic panel  (Ca, Cl, CO2, Creat, Gluc, K, Na, BUN)   Result Value Ref Range    Sodium 136 135 - 145 mmol/L    Potassium 4.1 3.4 - 5.3 mmol/L    Chloride 103 98 - 107 mmol/L    Carbon Dioxide (CO2) 28 22 - 29 mmol/L    Anion Gap 5 (L) 7 - 15 mmol/L    Urea Nitrogen 18.7 6.0 - 20.0 mg/dL    Creatinine 0.93 0.67 - 1.17 mg/dL    GFR Estimate >90 >60 mL/min/1.73m2    Calcium 9.5 8.8 - 10.4 mg/dL    Glucose 97 70 - 99 mg/dL           Signed Electronically by: Indira Aguirre NP

## 2025-04-30 NOTE — PROGRESS NOTES
Misbah Floyd  :  1970  DOS: 2025  MRN: [id number]  PCP: Leandro Hollis    Sports Medicine Clinic Visit      Interim History - May 1, 2025  - Last seen on 4/10/2024 for left complex tear of the medial meniscus and primary osteoarthritis of the left knee.   - Left knee corticosteroid injection completed on 4/10/2024 provided moderate to great relief in pain.   - The left knee is still better than it was prior to the last injection, but symptoms have started to return over the past few weeks.  He did get started on an additional medication recently, looks like Lasix, which has helped some of his left knee pain as well.  Most pain is with prolonged weightbearing and kneeling.  No interim injury, no instability or mechanical locking symptoms.    NEW MSK PROBLEM  - He would also like me to evaluate his right knee, as this has recently become more painful and on the left knee.  He does have a significant history of primary osteoarthritis in this knee and responded well to corticosteroid injection in the past.  He describes very similar symptoms to the left knee and hurts worse with prolonged weightbearing and kneeling.  He tends to wear kneepads when he needs to kneel on concrete floors, but this often brings on a lot of anterior and posterior knee pain.  He is hopeful for another corticosteroid injection today as these have been helpful in the past.      Initial Visit: April 10, 2024  HPI  Misbah Floyd is a 53 year old male who is seen in consultation at the request of  Lane Ayala M.D. presenting with left knee pain.    - Mechanism of Injury:    - Acute on chronic knee pain. Spun wrong 5 months ago, causing left knee pain. One month ago, was coming down off of a ladder and twisted his left knee, increasing pain.   - Pertinent history and prior evaluations:    - 3/20/2024 ED Visit: Left knee pain. Pain with pivoting. Ibuprofen tried. Denied numbness and tingling.  Recommended MRI,  prescription grade and ibuprofen, Ace wrap, compression sleeve, RICE protocol.    - XR L knee 3/20/2024 shows normal anatomic alignment, no fractures or dislocations, there is a small knee joint effusion.    - Left knee MRI 4/1/2024:   IMPRESSION:  1. Moderate size left knee joint effusion. Small popliteal cyst with  fluid leaking along the medial gastrocnemius muscle.    2. Complex multidirectional tearing of the left knee medial meniscus  with a horizontal component and free edge tearing     3. Diffuse thinning of the articular cartilage in the medial  femorotibial joint compartment with reactive subchondral bone marrow  edema along the far medial aspect of the medial tibial plateau.    4. The anterior and posterior cruciate ligament, medial and lateral  supporting structures, and lateral meniscus are intact.    - Hx of a right knee injury at work in 2022, given an injection and RICE protocol by Dr. Carrasco.  This was helpful for the right knee.    - Pain Character:    - Location:  left medial knee  - Character:  aching pain  - Duration:  acute on chronic. 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    No past medical history on file.  Past Surgical History:   Procedure Laterality Date    EXCISE MASS HIP Right  2/24/2017    Procedure: EXCISE MASS HIP;  Surgeon: Solis Lopes MD;  Location:  OR     CYSTOURETHROSCOPY  04/21/2006    Stone basket extraction. Left double-J stent placemnt.     No family history on file.      Objective  There were no vitals taken for this visit.    General: healthy, alert and in no acute distress.    HEENT: no scleral icterus or conjunctival erythema.   Skin: no suspicious lesions or rash. No jaundice.   CV: regular rhythm by palpation, 2+ distal pulses.  Resp: normal respiratory effort without conversational dyspnea.   Psych: normal mood and affect.    Gait: nonantalgic, appropriate coordination and balance.     Neuro:        - Sensation to light touch:    - Intact throughout the BLE including all peripheral nerve distributions.     MSK - Knee:       - Inspection:    - Moderate effusion on the right, mild on the left. No erythema, warmth, ecchymosis, lesion.        - ROM:    - Limited in knee extension slightly by pain.       - Palpation:    - TTP at the medial joint line bilaterally, medial gastroc on the left  - NTTP elsewhere.        - Strength:  (*antalgic)   - Knee Flexion  5   - Knee Extension 5*         - Special tests:        - Lachman:  Neg        - A/P drawer:  Neg        - Pivot shift:  Neg    - Reynaldo: Positive for medial compartment pain     - Varus stress:  Neg for laxity or pain     - Valgus stress:  Neg for laxity or pain    - Patellar grind:  Neg    - Thessaly: Positive for medial compartment pain   -Cristiane: Neg       - Functional tests:   - Deep squat:  Able to perform with increasing medial knee pain, no valgus deformity, no imbalance      Radiology  I independently reviewed the available relevant imaging in the chart with my interpretations as above in HPI.   - XR R knee 5/1/2025 shows a mild to moderate knee joint effusion of the right with moderate degenerative changes of the medial compartment bilaterally.  No acute fractures or dislocations.      Procedure  Large  Joint Injection/Arthocentesis: bilateral knee    Date/Time: 5/1/2025 2:27 PM    Performed by: Russel Roldan DO  Authorized by: Russel Roldan DO    Indications:  Osteoarthritis  Needle Size:  22 G  Needle Size comment:  22g 3.5in  Guidance: ultrasound    Approach:  Anterolateral  Location:  Knee  Laterality:  Bilateral      Medications (Right):  40 mg triamcinolone 40 MG/ML; 2 mL lidocaine 1 %; 2 mL BUPivacaine 0.5 %  Medications (Left):  40 mg triamcinolone 40 MG/ML; 2 mL lidocaine 1 %; 2 mL BUPivacaine 0.5 %  Outcome:  Tolerated well, no immediate complications  Procedure discussed: discussed risks, benefits, and alternatives    Consent Given by:  Patient  Prep: patient was prepped and draped in usual sterile fashion     Ultrasound images of procedure were permanently stored.       Ultrasound Guided Intra-articular Knee Injection   The knee was prepped and draped in a sterile manner.  Ultrasound identification of the patella, suprapatellar pouch, quadriceps tendon and femur in both long and short axis was obtained. The probe was placed in short axis to the femur. Then a 1.5 inch 22 gauge needle was placed under ultrasound guidance into the superior knee joint using a lateral approach. Then, a mixture of 2 mL of 1% lidocaine, 2 mL of 0.5% bupivacaine and 1 ml kenalog (40mg/ml) was injected without difficulty. The needle was removed and there was good hemostasis without complications.  Patient tolerated procedure well.  There was ultrasound documentation of needle placement and injection.         Assessment  1. Primary osteoarthritis of both knees        Plan - Initial  Misbah Floyd is a pleasant 53 year old male that presents with acute on chronic left knee pain.  He describes pain in the medial knee that is worse after multiple pivoting exacerbations.  Some mild clicking of the medial compartment without mechanical locking symptoms.  He was previously evaluated and there was concern for a possible medial  meniscus tear, an MRI was ordered.  MRI confirms a complex multidirectional tear of the medial meniscus and moderate to advanced chondromalacia of the medial compartment with a moderate joint effusion and popliteal cyst with evidence of leaking into the medial gastroc, intact stabilizing ligaments. History and physical exam appear most consistent with a complex medial meniscus tear and primary osteoarthritis of the left knee.  He is not currently experiencing significant mechanical locking symptoms and has actually been managing his knee pain fairly well and continues to work with pain but without major instability.     We discussed the nature of the condition and available treatment options, and mutually agreed upon the following plan:    - Imaging:          - Reviewed and independently interpreted the relevant imaging in the chart, including any imaging ordered for today's clinic.  - Reviewed results and images with patient.   - Medications:          - Discussed pharmacologic options for pain relief.   - May use NSAIDs (Ibuprofen, Naproxen) or Acetaminophen (Tylenol) as needed for pain control.   - Do not take these if previously advised to avoid them for other medical conditions.  - May also use topical medications such as lidocaine, IcyHot, BioFreeze, or Voltaren gel as needed for pain control.    - Voltaren gel is an anti-inflammatory cream that may be used up to 4 times per day over the painful area.   - Injections:          - Discussed possible injection options and alternatives.    - Injection options include: Intra-articular left knee joint aspiration and corticosteroid injection.  May also consider viscosupplementation or PRP in the future if needed.  - Performed an ultrasound-guided aspiration and corticosteroid injection of the left intra-articular knee joint today in clinic. Patient tolerated the procedure well without complications.     - Post-procedure instructions:    - Keep the injection site clean  and dry.   - Do not submerge the injection site for 24 hours (no baths, pools). Showers are ok.   - Rest the area for 24-48 hours before resuming normal activities. Avoid overexerting the area for the first few weeks.   - It may take 2-3 days to start noticing the effects of the injection and up to 3-4 weeks to feel significant benefits.   - Therapy:          - Discussed the benefits of therapy vs home exercise program for optimization of range of motion, flexibility, strength, stability and function.   - Preference is for a home exercise program.   - Home Exercise Program given today in clinic and recommendation given to perform HEP daily and after exacerbations.  - Modalities:          - May use ice, heat, massage or other modalities as needed.   - Bracing:          - Discussed bracing options and recommend using a hinged knee stability brace for comfort and stability as needed with ambulation and prolonged standing, especially at work.  He is currently managing well with an Ace wrap and would prefer to go without bracing.  He may request a brace at any time by contacting the clinic.    - Options presented in clinic and choice given to take our brace from clinic or purchase an equivalent brace from an outside source.   - Surgery:          - Discussed non-operative and operative treatment options for the patient's condition, including medial meniscus repair and arthroplasty.  His medial meniscus tear is complex and multidirectional, not involving the posterior root.  We discussed that based on his arthritis of the knee, primary repair of the meniscus may ultimately result in continued pain of his knee due to arthritis.  He is managing quite well at this time without significant mechanical locking symptoms, and would prefer to avoid surgery unless necessary.  We will continue with conservative care and consider surgery in the future for persistent symptoms.  - Activity:          - Encouraged to remain active and  participate in regular activities as symptoms allow.   Avoid or modify exacerbating activities as needed.  Offered a work letter for restrictions, but he feels this would be unnecessary.  He may request a work letter at any time if symptoms are preventing him from completing his work duties.  - Follow up:          - As needed for re-evaluation and update to treatment plan.  - May follow up sooner for new/worsening symptoms.  - May contact clinic by phone or MyChart for questions or concerns.     Update - 5/1/25:  Jeremy presents for follow-up of his chronic left knee pain with known primary osteoarthritis and medial meniscus tearing.  He received great benefit from a corticosteroid injection last year and then only recently started to become more uncomfortable.  No major change in symptoms.  No major instability or mechanical locking symptoms.  Feels achy anterior and posterior knee pain with prolonged weightbearing and kneeling.    He also would like to discuss a new musculoskeletal problem of right knee pain today, which we have not evaluated.  He does have a significant history of primary osteoarthritis in this knee and responded well to corticosteroid injections in the past.  He describes pain very similar to the left with anterior and posterior knee pain with weightbearing activities and kneeling.  Radiographs do confirm moderate osteoarthritis of the right knee with a mild effusion.  History and physical exam are also consistent with primary OA of the knee.  We discussed all available nonoperative and operative treatment options.  He would like to obtain a corticosteroid injection today as this has been very helpful for him in the past.  No contraindications.    Perform bilateral knee joint corticosteroid injections under ultrasound guidance today in clinic, patient tolerated procedures well, no complications.  See procedure note above for details.  Follow-up as needed in the future and repeat injections can be  considered in at least 3+ months.      Russel Roldan DO, CAQSM  [facility] - Department of Orthopedic Surgery       Disclaimer:  This note was prepared and written using Dragon Medical dictation software. As a result, there may be errors in the script that have gone undetected. Please consider this when interpreting the information in this note.

## 2025-05-01 ENCOUNTER — OFFICE VISIT (OUTPATIENT)
Dept: INTERNAL MEDICINE | Facility: CLINIC | Age: 55
End: 2025-05-01
Payer: COMMERCIAL

## 2025-05-01 ENCOUNTER — ANCILLARY PROCEDURE (OUTPATIENT)
Dept: GENERAL RADIOLOGY | Facility: CLINIC | Age: 55
End: 2025-05-01
Attending: STUDENT IN AN ORGANIZED HEALTH CARE EDUCATION/TRAINING PROGRAM
Payer: COMMERCIAL

## 2025-05-01 ENCOUNTER — OFFICE VISIT (OUTPATIENT)
Dept: ORTHOPEDICS | Facility: CLINIC | Age: 55
End: 2025-05-01
Payer: COMMERCIAL

## 2025-05-01 VITALS
DIASTOLIC BLOOD PRESSURE: 80 MMHG | RESPIRATION RATE: 12 BRPM | HEIGHT: 68 IN | BODY MASS INDEX: 47.74 KG/M2 | HEART RATE: 67 BPM | TEMPERATURE: 98.2 F | OXYGEN SATURATION: 97 % | SYSTOLIC BLOOD PRESSURE: 125 MMHG | WEIGHT: 315 LBS

## 2025-05-01 DIAGNOSIS — M25.561 ACUTE PAIN OF RIGHT KNEE: ICD-10-CM

## 2025-05-01 DIAGNOSIS — M17.0 PRIMARY OSTEOARTHRITIS OF BOTH KNEES: Primary | ICD-10-CM

## 2025-05-01 DIAGNOSIS — R60.0 BILATERAL LOWER EXTREMITY EDEMA: ICD-10-CM

## 2025-05-01 DIAGNOSIS — M25.561 ACUTE PAIN OF RIGHT KNEE: Primary | ICD-10-CM

## 2025-05-01 PROCEDURE — 73564 X-RAY EXAM KNEE 4 OR MORE: CPT | Mod: TC | Performed by: STUDENT IN AN ORGANIZED HEALTH CARE EDUCATION/TRAINING PROGRAM

## 2025-05-01 PROCEDURE — 99213 OFFICE O/P EST LOW 20 MIN: CPT | Performed by: INTERNAL MEDICINE

## 2025-05-01 PROCEDURE — 1126F AMNT PAIN NOTED NONE PRSNT: CPT | Performed by: INTERNAL MEDICINE

## 2025-05-01 PROCEDURE — 3074F SYST BP LT 130 MM HG: CPT | Performed by: INTERNAL MEDICINE

## 2025-05-01 PROCEDURE — 3079F DIAST BP 80-89 MM HG: CPT | Performed by: INTERNAL MEDICINE

## 2025-05-01 RX ORDER — TRIAMCINOLONE ACETONIDE 40 MG/ML
40 INJECTION, SUSPENSION INTRA-ARTICULAR; INTRAMUSCULAR
Status: COMPLETED | OUTPATIENT
Start: 2025-05-01 | End: 2025-05-01

## 2025-05-01 RX ORDER — BUPIVACAINE HYDROCHLORIDE 5 MG/ML
2 INJECTION, SOLUTION PERINEURAL
Status: COMPLETED | OUTPATIENT
Start: 2025-05-01 | End: 2025-05-01

## 2025-05-01 RX ORDER — LIDOCAINE HYDROCHLORIDE 10 MG/ML
2 INJECTION, SOLUTION INFILTRATION; PERINEURAL
Status: COMPLETED | OUTPATIENT
Start: 2025-05-01 | End: 2025-05-01

## 2025-05-01 RX ORDER — POTASSIUM CHLORIDE 1500 MG/1
20 TABLET, EXTENDED RELEASE ORAL DAILY
Qty: 30 TABLET | Refills: 0 | Status: SHIPPED | OUTPATIENT
Start: 2025-05-01 | End: 2025-06-06

## 2025-05-01 RX ORDER — FUROSEMIDE 20 MG/1
20 TABLET ORAL
Qty: 60 TABLET | Refills: 0 | Status: SHIPPED | OUTPATIENT
Start: 2025-05-01 | End: 2025-06-06

## 2025-05-01 RX ADMIN — LIDOCAINE HYDROCHLORIDE 2 ML: 10 INJECTION, SOLUTION INFILTRATION; PERINEURAL at 14:27

## 2025-05-01 RX ADMIN — TRIAMCINOLONE ACETONIDE 40 MG: 40 INJECTION, SUSPENSION INTRA-ARTICULAR; INTRAMUSCULAR at 14:27

## 2025-05-01 RX ADMIN — BUPIVACAINE HYDROCHLORIDE 2 ML: 5 INJECTION, SOLUTION PERINEURAL at 14:27

## 2025-05-01 ASSESSMENT — PAIN SCALES - GENERAL: PAINLEVEL_OUTOF10: NO PAIN (0)

## 2025-05-01 NOTE — PROGRESS NOTES
"  {PROVIDER CHARTING PREFERENCE:156478}    Subjective   Jeremy is a 54 year old, presenting for the following health issues:  Follow Up (Edema )        5/1/2025     2:36 PM   Additional Questions   Roomed by Gil     History of Present Illness       Reason for visit:  Follow up- Edema   He is taking medications regularly.        {MA/LPN/RN Pre-Provider Visit Orders- hCG/UA/Strep (Optional):227809}  {SUPERLIST (Optional):020317}  {additonal problems for provider to add (Optional):586218}    {ROS Picklists (Optional):952948}      Objective    /80 (BP Location: Left arm, Patient Position: Sitting, Cuff Size: Adult Large)   Pulse 67   Temp 98.2  F (36.8  C) (Temporal)   Resp 12   Ht 1.727 m (5' 8\")   Wt (!) 151.3 kg (333 lb 8 oz)   SpO2 97%   BMI 50.71 kg/m    Body mass index is 50.71 kg/m .  Physical Exam   {Exam List (Optional):135471}    {Diagnostic Test Results (Optional):616166}        Signed Electronically by: Leandro Hollis DO  {Email feedback regarding this note to [email or web address]   :765698}  " potassium supplement.  Discussed support hose.  Elevation is recommended.                                  FOLLOW UP   I have asked the patient to make an appointment for followup with me in 2 or 3 weeks for follow-up.  Have recommended weight loss through caloric restriction and exercise as tolerated.    Regarding routine vaccinations:  I have reviewed the patient's vaccination schedule and discussed the benefits of prophylactic vaccination in detail.  I recommend the patient contact their pharmacist for vaccinations.  Discussed that most insurance companies now favor reimbursement to the pharmacies and it will financially behoove the patient to have vaccinations performed at their pharmacy.        I have carefully explained the diagnosis and treatment options to the patient.  The patient has displayed an understanding of the above, and all subsequent questions were answered.      DO TORI Muniz    Portions of this note were produced using [employer]  Although every attempt at real-time proof reading has been made, occasional grammar/syntax errors may have been missed.

## 2025-05-01 NOTE — LETTER
2025      Misbah Floyd  [address] [facility] [id number]      Dear Colleague,    Thank you for referring your patient, Misbah Floyd, to the [facility] [place]. Please see a copy of my visit note below.    Misbah Floyd  :  1970  DOS: 2025  MRN: [id number]  PCP: Leandro Hollis    Sports Medicine Clinic Visit      Interim History - May 1, 2025  - Last seen on 4/10/2024 for left complex tear of the medial meniscus and primary osteoarthritis of the left knee.   - Left knee corticosteroid injection completed on 4/10/2024 provided moderate to great relief in pain.   - The left knee is still better than it was prior to the last injection, but symptoms have started to return over the past few weeks.  He did get started on an additional medication recently, looks like Lasix, which has helped some of his left knee pain as well.  Most pain is with prolonged weightbearing and kneeling.  No interim injury, no instability or mechanical locking symptoms.    NEW MSK PROBLEM  - He would also like me to evaluate his right knee, as this has recently become more painful and on the left knee.  He does have a significant history of primary osteoarthritis in this knee and responded well to corticosteroid injection in the past.  He describes very similar symptoms to the left knee and hurts worse with prolonged weightbearing and kneeling.  He tends to wear kneepads when he needs to kneel on concrete floors, but this often brings on a lot of anterior and posterior knee pain.  He is hopeful for another corticosteroid injection today as these have been helpful in the past.      Initial Visit: April 10, 2024  HPI  Misbah Floyd is a 53 year old male who is seen in consultation at the request of  Lane Ayala M.D. presenting with left knee pain.    - Mechanism of Injury:    - Acute on chronic knee pain. Spun wrong 5 months ago, causing left knee pain. One month  ago, was coming down off of a ladder and twisted his left knee, increasing pain.   - Pertinent history and prior evaluations:    - 3/20/2024 ED Visit: Left knee pain. Pain with pivoting. Ibuprofen tried. Denied numbness and tingling.  Recommended MRI, prescription grade and ibuprofen, Ace wrap, compression sleeve, RICE protocol.    - XR L knee 3/20/2024 shows normal anatomic alignment, no fractures or dislocations, there is a small knee joint effusion.    - Left knee MRI 4/1/2024:   IMPRESSION:  1. Moderate size left knee joint effusion. Small popliteal cyst with  fluid leaking along the medial gastrocnemius muscle.    2. Complex multidirectional tearing of the left knee medial meniscus  with a horizontal component and free edge tearing     3. Diffuse thinning of the articular cartilage in the medial  femorotibial joint compartment with reactive subchondral bone marrow  edema along the far medial aspect of the medial tibial plateau.    4. The anterior and posterior cruciate ligament, medial and lateral  supporting structures, and lateral meniscus are intact.    - Hx of a right knee injury at work in 2022, given an injection and RICE protocol by Dr. Carrasco.  This was helpful for the right knee.    - Pain Character:    - Location:  left medial knee  - Character:  aching pain  - Duration:  acute on chronic. 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    No past medical history on file.  Past Surgical History:   Procedure Laterality Date     EXCISE MASS HIP Right 2/24/2017    Procedure: EXCISE MASS HIP;  Surgeon: Solis Lopes MD;  Location:  OR      CYSTOURETHROSCOPY  04/21/2006    Stone basket extraction. Left double-J stent placemnt.     No family history on file.      Objective  There were no vitals taken for this visit.    General: healthy, alert and in no acute distress.    HEENT: no scleral icterus or conjunctival erythema.   Skin: no suspicious lesions or rash. No jaundice.   CV: regular rhythm by palpation, 2+ distal pulses.  Resp: normal respiratory effort without conversational dyspnea.   Psych: normal mood and affect.    Gait: nonantalgic, appropriate coordination and balance.     Neuro:        - Sensation to light touch:    - Intact throughout the BLE including all peripheral nerve distributions.     MSK - Knee:       - Inspection:    - Moderate effusion on the right, mild on the left. No erythema, warmth, ecchymosis, lesion.        - ROM:    - Limited in knee extension slightly by pain.       - Palpation:    - TTP at the medial joint line bilaterally, medial gastroc on the left  - NTTP elsewhere.        - Strength:  (*antalgic)   - Knee Flexion  5   - Knee Extension 5*         - Special tests:        - Lachman:  Neg        - A/P drawer:  Neg        - Pivot shift:  Neg    - Reynaldo: Positive for medial compartment pain     - Varus stress:  Neg for laxity or pain     - Valgus stress:  Neg for laxity or pain    - Patellar grind:  Neg    - Thessaly: Positive for medial compartment pain   -Cristiane: Neg       - Functional tests:   - Deep squat:  Able to perform with increasing medial knee pain, no valgus deformity, no imbalance      Radiology  I independently reviewed the available relevant imaging in the chart  with my interpretations as above in HPI.   - XR R knee 5/1/2025 shows a mild to moderate knee joint effusion of the right with moderate degenerative changes of the medial compartment bilaterally.  No acute fractures or dislocations.      Procedure  Large Joint Injection/Arthocentesis: bilateral knee    Date/Time: 5/1/2025 2:27 PM    Performed by: Russel Roldan DO  Authorized by: Russel Roldan DO    Indications:  Osteoarthritis  Needle Size:  22 G  Needle Size comment:  22g 3.5in  Guidance: ultrasound    Approach:  Anterolateral  Location:  Knee  Laterality:  Bilateral      Medications (Right):  40 mg triamcinolone 40 MG/ML; 2 mL lidocaine 1 %; 2 mL BUPivacaine 0.5 %  Medications (Left):  40 mg triamcinolone 40 MG/ML; 2 mL lidocaine 1 %; 2 mL BUPivacaine 0.5 %  Outcome:  Tolerated well, no immediate complications  Procedure discussed: discussed risks, benefits, and alternatives    Consent Given by:  Patient  Prep: patient was prepped and draped in usual sterile fashion     Ultrasound images of procedure were permanently stored.       Ultrasound Guided Intra-articular Knee Injection   The knee was prepped and draped in a sterile manner.  Ultrasound identification of the patella, suprapatellar pouch, quadriceps tendon and femur in both long and short axis was obtained. The probe was placed in short axis to the femur. Then a 1.5 inch 22 gauge needle was placed under ultrasound guidance into the superior knee joint using a lateral approach. Then, a mixture of 2 mL of 1% lidocaine, 2 mL of 0.5% bupivacaine and 1 ml kenalog (40mg/ml) was injected without difficulty. The needle was removed and there was good hemostasis without complications.  Patient tolerated procedure well.  There was ultrasound documentation of needle placement and injection.         Assessment  1. Primary osteoarthritis of both knees        Plan - Initial  Misbah Floyd is a pleasant 53 year old male that presents with acute on chronic left knee  pain.  He describes pain in the medial knee that is worse after multiple pivoting exacerbations.  Some mild clicking of the medial compartment without mechanical locking symptoms.  He was previously evaluated and there was concern for a possible medial meniscus tear, an MRI was ordered.  MRI confirms a complex multidirectional tear of the medial meniscus and moderate to advanced chondromalacia of the medial compartment with a moderate joint effusion and popliteal cyst with evidence of leaking into the medial gastroc, intact stabilizing ligaments. History and physical exam appear most consistent with a complex medial meniscus tear and primary osteoarthritis of the left knee.  He is not currently experiencing significant mechanical locking symptoms and has actually been managing his knee pain fairly well and continues to work with pain but without major instability.     We discussed the nature of the condition and available treatment options, and mutually agreed upon the following plan:    - Imaging:          - Reviewed and independently interpreted the relevant imaging in the chart, including any imaging ordered for today's clinic.  - Reviewed results and images with patient.   - Medications:          - Discussed pharmacologic options for pain relief.   - May use NSAIDs (Ibuprofen, Naproxen) or Acetaminophen (Tylenol) as needed for pain control.   - Do not take these if previously advised to avoid them for other medical conditions.  - May also use topical medications such as lidocaine, IcyHot, BioFreeze, or Voltaren gel as needed for pain control.    - Voltaren gel is an anti-inflammatory cream that may be used up to 4 times per day over the painful area.   - Injections:          - Discussed possible injection options and alternatives.    - Injection options include: Intra-articular left knee joint aspiration and corticosteroid injection.  May also consider viscosupplementation or PRP in the future if needed.  -  Performed an ultrasound-guided aspiration and corticosteroid injection of the left intra-articular knee joint today in clinic. Patient tolerated the procedure well without complications.     - Post-procedure instructions:    - Keep the injection site clean and dry.   - Do not submerge the injection site for 24 hours (no baths, pools). Showers are ok.   - Rest the area for 24-48 hours before resuming normal activities. Avoid overexerting the area for the first few weeks.   - It may take 2-3 days to start noticing the effects of the injection and up to 3-4 weeks to feel significant benefits.   - Therapy:          - Discussed the benefits of therapy vs home exercise program for optimization of range of motion, flexibility, strength, stability and function.   - Preference is for a home exercise program.   - Home Exercise Program given today in clinic and recommendation given to perform HEP daily and after exacerbations.  - Modalities:          - May use ice, heat, massage or other modalities as needed.   - Bracing:          - Discussed bracing options and recommend using a hinged knee stability brace for comfort and stability as needed with ambulation and prolonged standing, especially at work.  He is currently managing well with an Ace wrap and would prefer to go without bracing.  He may request a brace at any time by contacting the clinic.    - Options presented in clinic and choice given to take our brace from clinic or purchase an equivalent brace from an outside source.   - Surgery:          - Discussed non-operative and operative treatment options for the patient's condition, including medial meniscus repair and arthroplasty.  His medial meniscus tear is complex and multidirectional, not involving the posterior root.  We discussed that based on his arthritis of the knee, primary repair of the meniscus may ultimately result in continued pain of his knee due to arthritis.  He is managing quite well at this time  without significant mechanical locking symptoms, and would prefer to avoid surgery unless necessary.  We will continue with conservative care and consider surgery in the future for persistent symptoms.  - Activity:          - Encouraged to remain active and participate in regular activities as symptoms allow.   Avoid or modify exacerbating activities as needed.  Offered a work letter for restrictions, but he feels this would be unnecessary.  He may request a work letter at any time if symptoms are preventing him from completing his work duties.  - Follow up:          - As needed for re-evaluation and update to treatment plan.  - May follow up sooner for new/worsening symptoms.  - May contact clinic by phone or MyChart for questions or concerns.     Update - 5/1/25:  Jeremy presents for follow-up of his chronic left knee pain with known primary osteoarthritis and medial meniscus tearing.  He received great benefit from a corticosteroid injection last year and then only recently started to become more uncomfortable.  No major change in symptoms.  No major instability or mechanical locking symptoms.  Feels achy anterior and posterior knee pain with prolonged weightbearing and kneeling.    He also would like to discuss a new musculoskeletal problem of right knee pain today, which we have not evaluated.  He does have a significant history of primary osteoarthritis in this knee and responded well to corticosteroid injections in the past.  He describes pain very similar to the left with anterior and posterior knee pain with weightbearing activities and kneeling.  Radiographs do confirm moderate osteoarthritis of the right knee with a mild effusion.  History and physical exam are also consistent with primary OA of the knee.  We discussed all available nonoperative and operative treatment options.  He would like to obtain a corticosteroid injection today as this has been very helpful for him in the past.  No  contraindications.    Perform bilateral knee joint corticosteroid injections under ultrasound guidance today in clinic, patient tolerated procedures well, no complications.  See procedure note above for details.  Follow-up as needed in the future and repeat injections can be considered in at least 3+ months.      Russel Roldan DO, [facility]  [facility] - Department of Orthopedic Surgery       Disclaimer:  This note was prepared and written using Dragon Medical dictation software. As a result, there may be errors in the script that have gone undetected. Please consider this when interpreting the information in this note.       Again, thank you for allowing me to participate in the care of your patient.        Sincerely,        Russel Roldan DO    Electronically signed

## 2025-05-06 ENCOUNTER — TELEPHONE (OUTPATIENT)
Dept: INTERNAL MEDICINE | Facility: CLINIC | Age: 55
End: 2025-05-06
Payer: COMMERCIAL

## 2025-05-06 NOTE — TELEPHONE ENCOUNTER
Started potassium on Friday. Sunday night was experiencing some tenderness in the right kidney area. This morning he had another noticeable tenderness in the same area. Feels this when bending or movement as well. Patient states it is more of a dull ache when at rest.   Denies dysuria or any other urinary symptoms. He is concerned that the potassium is causing him kidney pain. He would like input from primary on if he should stay on this.    Routing to PCP. Patient would like a call back with provider recommendations. Please leave detailed message per patient request if he does not answer.    Ibeth Parmar, RN, BSN

## 2025-05-07 NOTE — TELEPHONE ENCOUNTER
Talked with patient, informed him of Dr. Hollis's note. Patient states he thinks he is having the pain from working on his wife's car this past weekend. So he is NOT going to stop his potassium. Dr. Hollis informed of this.

## 2025-05-07 NOTE — TELEPHONE ENCOUNTER
It is extremely unlikely that oral potassium is causing to have kidney pain.  If you would like to discontinue the potassium for couple days and see if this makes a difference, that would be fine.  His potassium levels were normal.  However, with the addition of the diuretic, he is going to lose some potassium.  Let me know if he decides to continue the potassium or temporarily discontinue it.    Bunny

## 2025-06-06 ENCOUNTER — RESULTS FOLLOW-UP (OUTPATIENT)
Dept: FAMILY MEDICINE | Facility: CLINIC | Age: 55
End: 2025-06-06

## 2025-06-06 ENCOUNTER — APPOINTMENT (OUTPATIENT)
Dept: ULTRASOUND IMAGING | Facility: CLINIC | Age: 55
End: 2025-06-06
Attending: EMERGENCY MEDICINE
Payer: COMMERCIAL

## 2025-06-06 ENCOUNTER — HOSPITAL ENCOUNTER (EMERGENCY)
Facility: CLINIC | Age: 55
Discharge: HOME | End: 2025-06-06
Attending: EMERGENCY MEDICINE | Admitting: EMERGENCY MEDICINE
Payer: COMMERCIAL

## 2025-06-06 VITALS
DIASTOLIC BLOOD PRESSURE: 75 MMHG | TEMPERATURE: 97.7 F | HEART RATE: 60 BPM | RESPIRATION RATE: 18 BRPM | BODY MASS INDEX: 47.74 KG/M2 | SYSTOLIC BLOOD PRESSURE: 141 MMHG | WEIGHT: 315 LBS | HEIGHT: 68 IN | OXYGEN SATURATION: 98 %

## 2025-06-06 DIAGNOSIS — T14.8XXA SUPERFICIAL FOREIGN BODY (SLIVER): ICD-10-CM

## 2025-06-06 DIAGNOSIS — R60.0 BILATERAL LOWER EXTREMITY EDEMA: ICD-10-CM

## 2025-06-06 DIAGNOSIS — R60.0 LOWER EXTREMITY EDEMA: ICD-10-CM

## 2025-06-06 LAB
ALBUMIN SERPL BCG-MCNC: 3.9 G/DL (ref 3.5–5.2)
ALP SERPL-CCNC: 69 U/L (ref 40–150)
ALT SERPL W P-5'-P-CCNC: 28 U/L (ref 0–70)
ANION GAP SERPL CALCULATED.3IONS-SCNC: 10 MMOL/L (ref 7–15)
AST SERPL W P-5'-P-CCNC: 19 U/L (ref 0–45)
BASOPHILS # BLD AUTO: 0.1 10E3/UL (ref 0–0.2)
BASOPHILS NFR BLD AUTO: 1 %
BILIRUB SERPL-MCNC: 0.3 MG/DL
BUN SERPL-MCNC: 18.6 MG/DL (ref 6–20)
CALCIUM SERPL-MCNC: 9.2 MG/DL (ref 8.8–10.4)
CHLORIDE SERPL-SCNC: 101 MMOL/L (ref 98–107)
CREAT SERPL-MCNC: 0.85 MG/DL (ref 0.67–1.17)
EGFRCR SERPLBLD CKD-EPI 2021: >90 ML/MIN/1.73M2
EOSINOPHIL # BLD AUTO: 0.2 10E3/UL (ref 0–0.7)
EOSINOPHIL NFR BLD AUTO: 2 %
ERYTHROCYTE [DISTWIDTH] IN BLOOD BY AUTOMATED COUNT: 12.4 % (ref 10–15)
GLUCOSE SERPL-MCNC: 87 MG/DL (ref 70–99)
HCO3 SERPL-SCNC: 25 MMOL/L (ref 22–29)
HCT VFR BLD AUTO: 42.6 % (ref 40–53)
HGB BLD-MCNC: 14.7 G/DL (ref 13.3–17.7)
IMM GRANULOCYTES # BLD: 0.1 10E3/UL
IMM GRANULOCYTES NFR BLD: 1 %
LYMPHOCYTES # BLD AUTO: 2.8 10E3/UL (ref 0.8–5.3)
LYMPHOCYTES NFR BLD AUTO: 28 %
MAGNESIUM SERPL-MCNC: 2.4 MG/DL (ref 1.7–2.3)
MCH RBC QN AUTO: 30.4 PG (ref 26.5–33)
MCHC RBC AUTO-ENTMCNC: 34.5 G/DL (ref 31.5–36.5)
MCV RBC AUTO: 88 FL (ref 78–100)
MONOCYTES # BLD AUTO: 0.9 10E3/UL (ref 0–1.3)
MONOCYTES NFR BLD AUTO: 9 %
NEUTROPHILS # BLD AUTO: 5.9 10E3/UL (ref 1.6–8.3)
NEUTROPHILS NFR BLD AUTO: 60 %
NRBC # BLD AUTO: 0 10E3/UL
NRBC BLD AUTO-RTO: 0 /100
PLATELET # BLD AUTO: 218 10E3/UL (ref 150–450)
POTASSIUM SERPL-SCNC: 4 MMOL/L (ref 3.4–5.3)
PROT SERPL-MCNC: 6.7 G/DL (ref 6.4–8.3)
RBC # BLD AUTO: 4.83 10E6/UL (ref 4.4–5.9)
SODIUM SERPL-SCNC: 136 MMOL/L (ref 135–145)
TSH SERPL DL<=0.005 MIU/L-ACNC: 1.91 UIU/ML (ref 0.3–4.2)
WBC # BLD AUTO: 10 10E3/UL (ref 4–11)

## 2025-06-06 PROCEDURE — 80053 COMPREHEN METABOLIC PANEL: CPT | Performed by: EMERGENCY MEDICINE

## 2025-06-06 PROCEDURE — 85025 COMPLETE CBC W/AUTO DIFF WBC: CPT | Performed by: EMERGENCY MEDICINE

## 2025-06-06 PROCEDURE — 93971 EXTREMITY STUDY: CPT | Mod: LT

## 2025-06-06 PROCEDURE — 36415 COLL VENOUS BLD VENIPUNCTURE: CPT | Performed by: EMERGENCY MEDICINE

## 2025-06-06 PROCEDURE — 99284 EMERGENCY DEPT VISIT MOD MDM: CPT | Mod: 25 | Performed by: EMERGENCY MEDICINE

## 2025-06-06 PROCEDURE — 84443 ASSAY THYROID STIM HORMONE: CPT | Performed by: EMERGENCY MEDICINE

## 2025-06-06 PROCEDURE — 83735 ASSAY OF MAGNESIUM: CPT | Performed by: EMERGENCY MEDICINE

## 2025-06-06 PROCEDURE — 99284 EMERGENCY DEPT VISIT MOD MDM: CPT | Performed by: EMERGENCY MEDICINE

## 2025-06-06 RX ORDER — FUROSEMIDE 20 MG/1
40 TABLET ORAL DAILY
Qty: 60 TABLET | Refills: 0 | Status: SHIPPED | OUTPATIENT
Start: 2025-06-06

## 2025-06-06 RX ORDER — POTASSIUM CHLORIDE 1500 MG/1
20 TABLET, EXTENDED RELEASE ORAL DAILY
Qty: 30 TABLET | Refills: 0 | Status: SHIPPED | OUTPATIENT
Start: 2025-06-06

## 2025-06-06 ASSESSMENT — ACTIVITIES OF DAILY LIVING (ADL)
ADLS_ACUITY_SCORE: 41
ADLS_ACUITY_SCORE: 41

## 2025-06-06 ASSESSMENT — COLUMBIA-SUICIDE SEVERITY RATING SCALE - C-SSRS
6. HAVE YOU EVER DONE ANYTHING, STARTED TO DO ANYTHING, OR PREPARED TO DO ANYTHING TO END YOUR LIFE?: NO
1. IN THE PAST MONTH, HAVE YOU WISHED YOU WERE DEAD OR WISHED YOU COULD GO TO SLEEP AND NOT WAKE UP?: NO
2. HAVE YOU ACTUALLY HAD ANY THOUGHTS OF KILLING YOURSELF IN THE PAST MONTH?: NO

## 2025-06-06 NOTE — ED TRIAGE NOTES
Pt presents with concerns of left lower pain and edema.  Pt's clinic sent him to rule out a blood clot.  Pt completed his course of potassium, when it was finished the pain and concerns related to his lower legs.       Triage Assessment (Adult)       Row Name 06/06/25 2183          Triage Assessment    Airway WDL WDL        Respiratory WDL    Respiratory WDL WDL        Skin Circulation/Temperature WDL    Skin Circulation/Temperature WDL WDL        Cardiac WDL    Cardiac WDL WDL        Peripheral/Neurovascular WDL    Peripheral Neurovascular WDL WDL        Cognitive/Neuro/Behavioral WDL    Cognitive/Neuro/Behavioral WDL WDL

## 2025-06-06 NOTE — ED PROVIDER NOTES
"  History     Chief Complaint   Patient presents with    Leg Problem     HPI  History per patient, medical records    This is a 54-year-old male, history of morbid obesity, CITLALLI, presenting with leg problem.  Patient states that he started having swelling and pain in his lower legs, left greater than right and was seen in clinic April 21.  He was started on Lasix 20 mg daily.  He followed up on 5/1/2025 and his Lasix dose was doubled and he was started on potassium.  He also received bilateral knee steroid injections.  He states that he took all of his potassium tablets, last dose was 6 days ago.  He was busy that day with a grad party on the following day with cleaning up.  He has noticed since the end of the potassium tablets his swelling of in his legs has increased.  He also has pain around the ankle.  He stands on a \"fatigue mat\" at work and has also tried to add a layer of cardboard but notes continued swelling, pain of the left lower leg, ankle into the foot.  The leg seems warm in the mornings.  No fevers or chills.  Still urinating.  He does not weigh himself daily.  He denies any chest pain.  He has baseline dyspnea on exertion when climbing stairs.  No history of blood clots.  He does not smoke.  No recent surgeries or prolonged immobility.    Allergies:  Allergies   Allergen Reactions    Bees Swelling    Wasp Venom Protein Other (See Comments)    Amoxicillin Rash     Rash & cellulitis - see 3/16/17 encounter    Bactrim [Sulfamethoxazole-Trimethoprim] Rash     Rash & cellulitis - see 3/16/17 encounter - this was prescribed last of 3 abx after amoxicillin, cephalexin - rash didn't get better - only after Benadryl did improve    Keflex [Cephalexin] Rash     Rash & cellulitis - see 3/16/17 encounter       Problem List:    Patient Active Problem List    Diagnosis Date Noted    Morbid obesity due to excess calories (H) 01/20/2017     Priority: Medium    CITLALLI (obstructive sleep apnea) 01/20/2017     Priority: " "Medium    Abscess of anal and rectal regions 02/19/2007     Priority: Medium        Past Medical History:    No past medical history on file.    Past Surgical History:    Past Surgical History:   Procedure Laterality Date    EXCISE MASS HIP Right 2/24/2017    Procedure: EXCISE MASS HIP;  Surgeon: Solis Lopes MD;  Location: [facility]    HC CYSTOURETHROSCOPY  04/21/2006    Stone basket extraction. Left double-J stent placemnt.       Family History:    No family history on file.    Social History:  Marital Status:   [2]  Social History     Tobacco Use    Smoking status: Never    Smokeless tobacco: Never   Vaping Use    Vaping status: Never Used   Substance Use Topics    Alcohol use: No     Alcohol/week: 0.0 standard drinks of alcohol    Drug use: No        Medications:    furosemide (LASIX) 20 MG tablet  potassium chloride teresa ER (KLOR-CON M20) 20 MEQ CR tablet  ciclopirox (PENLAC) 8 % external solution  ibuprofen (ADVIL/MOTRIN) 600 MG tablet          Review of Systems  All other ROS reviewed and are negative or non-contributory except as stated in HPI.     Physical Exam   BP: (!) 171/116  Pulse: 68  Temp: 97.7  F (36.5  C)  Height: 172.7 cm (5' 8\")  Weight: (!) 151 kg (333 lb)  SpO2: 98 %      Physical Exam  Vitals and nursing note reviewed.   Constitutional:       Comments: Pleasant, morbidly obese gentleman sitting in the bed   HENT:      Head: Normocephalic.      Mouth/Throat:      Pharynx: Oropharynx is clear.   Eyes:      Extraocular Movements: Extraocular movements intact.   Cardiovascular:      Rate and Rhythm: Normal rate and regular rhythm.      Pulses: Normal pulses.   Pulmonary:      Effort: Pulmonary effort is normal.      Breath sounds: Normal breath sounds.   Musculoskeletal:      Cervical back: Normal range of motion.      Comments: Bilateral lower extremity pitting edema left greater than right with some venous stasis changes on the left.  Mild warmth.  Tenderness along the calf, anterior " leg, foot on the left.  Strong pulses, normal sensation   Skin:     General: Skin is warm and dry.      Findings: No rash.      Comments: Patient has a small sliver with pain on the bottom of his left foot near the arch.   Neurological:      General: No focal deficit present.      Mental Status: He is alert.   Psychiatric:         Mood and Affect: Mood normal.         Behavior: Behavior normal.         ED Course (with Medical Decision Making)    Pt seen and examined by me.  RN and EPIC notes reviewed.       Patient with lower extremity edema, pain, left greater than right.  Plan ultrasound to rule out DVT.  I am also going to check basic labs to be sure his potassium is okay and check white count.    I used ChloraPrep and an 18-gauge needle to gently coax the sliver out of his left foot.    CBC shows normal white count, hemoglobin, platelets.  Comprehensive metabolic panel is completely normal.  TSH normal.    Ultrasound shows no evidence for DVT.  Reviewed by myself.    Results discussed with the patient.  We talked about decreasing the sodium in his diet.  We also talked about compression stockings, elevating his legs, calorie intake.  I am going to send a message to his primary care provider about his ED visit.  I am changing his Lasix from 20 mg twice daily to 40 mg daily and refilling his potassium.  Follow-up in clinic.  Return for worsening, changes or concerns.          Procedures  Results for orders placed or performed during the hospital encounter of 06/06/25 (from the past 24 hours)   CBC with platelets differential    Narrative    The following orders were created for panel order CBC with platelets differential.  Procedure                               Abnormality         Status                     ---------                               -----------         ------                     CBC with platelets and ...[[id number]]                      Final result                 Please view results for these  tests on the individual orders.   Comprehensive metabolic panel   Result Value Ref Range    Sodium 136 135 - 145 mmol/L    Potassium 4.0 3.4 - 5.3 mmol/L    Carbon Dioxide (CO2) 25 22 - 29 mmol/L    Anion Gap 10 7 - 15 mmol/L    Urea Nitrogen 18.6 6.0 - 20.0 mg/dL    Creatinine 0.85 0.67 - 1.17 mg/dL    GFR Estimate >90 >60 mL/min/1.73m2    Calcium 9.2 8.8 - 10.4 mg/dL    Chloride 101 98 - 107 mmol/L    Glucose 87 70 - 99 mg/dL    Alkaline Phosphatase 69 40 - 150 U/L    AST 19 0 - 45 U/L    ALT 28 0 - 70 U/L    Protein Total 6.7 6.4 - 8.3 g/dL    Albumin 3.9 3.5 - 5.2 g/dL    Bilirubin Total 0.3 <=1.2 mg/dL   Magnesium   Result Value Ref Range    Magnesium 2.4 (H) 1.7 - 2.3 mg/dL   TSH with free T4 reflex   Result Value Ref Range    TSH 1.91 0.30 - 4.20 uIU/mL   CBC with platelets and differential   Result Value Ref Range    WBC Count 10.0 4.0 - 11.0 10e3/uL    RBC Count 4.83 4.40 - 5.90 10e6/uL    Hemoglobin 14.7 13.3 - 17.7 g/dL    Hematocrit 42.6 40.0 - 53.0 %    MCV 88 78 - 100 fL    MCH 30.4 26.5 - 33.0 pg    MCHC 34.5 31.5 - 36.5 g/dL    RDW 12.4 10.0 - 15.0 %    Platelet Count 218 150 - 450 10e3/uL    % Neutrophils 60 %    % Lymphocytes 28 %    % Monocytes 9 %    % Eosinophils 2 %    % Basophils 1 %    % Immature Granulocytes 1 %    NRBCs per 100 WBC 0 <1 /100    Absolute Neutrophils 5.9 1.6 - 8.3 10e3/uL    Absolute Lymphocytes 2.8 0.8 - 5.3 10e3/uL    Absolute Monocytes 0.9 0.0 - 1.3 10e3/uL    Absolute Eosinophils 0.2 0.0 - 0.7 10e3/uL    Absolute Basophils 0.1 0.0 - 0.2 10e3/uL    Absolute Immature Granulocytes 0.1 <=0.4 10e3/uL    Absolute NRBCs 0.0 10e3/uL   US Lower Extremity Venous Duplex Left    Narrative    EXAM: US LOWER EXTREMITY VENOUS DUPLEX LEFT  LOCATION: [facility]  DATE: 6/6/2025    INDICATION: swelling, pain  COMPARISON: None.  TECHNIQUE: Venous Duplex ultrasound of the left lower extremity with and without compression, augmentation and duplex. Color flow and  spectral Doppler with waveform analysis performed.    FINDINGS: Exam includes the common femoral, femoral, popliteal, and contralateral common femoral veins as well as segmentally visualized deep calf veins and greater saphenous vein.     LEFT: No deep vein thrombosis. No superficial thrombophlebitis. No popliteal cyst.      Impression    IMPRESSION:  1.  No deep venous thrombosis in the left lower extremity.       Medications - No data to display    Assessments & Plan     I have reviewed the findings, diagnosis, plan and need for follow up with the patient.    Current Discharge Medication List          Final diagnoses:   Lower extremity edema   Superficial foreign body (sliver)     Disposition: Patient discharged home in stable condition.  Plan as above.  Return for concerns.     Note: Chart documentation done in part with Dragon Voice Recognition software. Although reviewed after completion, some word and grammatical errors may remain.     6/6/2025   [facility] EMERGENCY DEPT       Iveth Roland MD  06/06/25 1932

## 2025-06-07 NOTE — DISCHARGE INSTRUCTIONS
There is no evidence for blood clot.    I would like to have you take your diuretic/water pill, furosemide, once daily.  I would like you to take 2 tablets daily in the morning.  I am also renewing your potassium.    It would be best if you could limit sodium intake.  Please see attached information.    Elevate your legs at or above the level of your heart is much as possible to decrease the swelling.    I will send a message to your primary care provider about your ED visit.    Return for significant worsening, changes or concerns.    I hope this starts to improve very quickly!!

## 2025-06-17 ENCOUNTER — TELEPHONE (OUTPATIENT)
Dept: SLEEP MEDICINE | Facility: CLINIC | Age: 55
End: 2025-06-17
Payer: COMMERCIAL

## 2025-06-17 NOTE — TELEPHONE ENCOUNTER
Writer left patient a message to contact sleep clinic. Patient has an upcoming sleep return visit, a manual download is needed for this appointment.   Renee Rocha MA

## 2025-06-26 ENCOUNTER — VIRTUAL VISIT (OUTPATIENT)
Dept: SLEEP MEDICINE | Facility: CLINIC | Age: 55
End: 2025-06-26
Payer: COMMERCIAL

## 2025-06-26 VITALS — BODY MASS INDEX: 47.74 KG/M2 | WEIGHT: 315 LBS | HEIGHT: 68 IN

## 2025-06-26 DIAGNOSIS — G47.33 OSA (OBSTRUCTIVE SLEEP APNEA): Primary | ICD-10-CM

## 2025-06-26 DIAGNOSIS — E66.01 MORBID OBESITY DUE TO EXCESS CALORIES (H): ICD-10-CM

## 2025-06-26 ASSESSMENT — SLEEP AND FATIGUE QUESTIONNAIRES
HOW LIKELY ARE YOU TO NOD OFF OR FALL ASLEEP WHILE SITTING AND TALKING TO SOMEONE: WOULD NEVER DOZE
HOW LIKELY ARE YOU TO NOD OFF OR FALL ASLEEP WHEN YOU ARE A PASSENGER IN A CAR FOR AN HOUR WITHOUT A BREAK: WOULD NEVER DOZE
HOW LIKELY ARE YOU TO NOD OFF OR FALL ASLEEP WHILE WATCHING TV: WOULD NEVER DOZE
HOW LIKELY ARE YOU TO NOD OFF OR FALL ASLEEP WHILE SITTING AND READING: MODERATE CHANCE OF DOZING
HOW LIKELY ARE YOU TO NOD OFF OR FALL ASLEEP WHILE LYING DOWN TO REST IN THE AFTERNOON WHEN CIRCUMSTANCES PERMIT: SLIGHT CHANCE OF DOZING
HOW LIKELY ARE YOU TO NOD OFF OR FALL ASLEEP WHILE SITTING QUIETLY AFTER LUNCH WITHOUT ALCOHOL: SLIGHT CHANCE OF DOZING
HOW LIKELY ARE YOU TO NOD OFF OR FALL ASLEEP IN A CAR, WHILE STOPPED FOR A FEW MINUTES IN TRAFFIC: WOULD NEVER DOZE
HOW LIKELY ARE YOU TO NOD OFF OR FALL ASLEEP WHILE SITTING INACTIVE IN A PUBLIC PLACE: WOULD NEVER DOZE

## 2025-06-26 ASSESSMENT — PAIN SCALES - GENERAL: PAINLEVEL_OUTOF10: NO PAIN (0)

## 2025-06-26 NOTE — NURSING NOTE
Current patient location: [address] [place] [id number]    Is the patient currently in the state of [place]? YES    Visit mode: VIDEO    If the visit is dropped, the patient can be reconnected by:VIDEO VISIT: Text to cell phone:   Telephone Information:   Mobile [phone]   Mobile [phone]       Will anyone else be joining the visit? NO  (If patient encounters technical issues they should call [phone])    Are changes needed to the allergy or medication list? No    Are refills needed on medications prescribed by this physician? NO    Rooming Documentation:  Questionnaire(s) completed    Reason for visit: RECHECK    Alyssa FOREMANF

## 2025-06-26 NOTE — PROGRESS NOTES
Virtual Visit Details    Type of service:  Video Visit     Originating Location (pt. Location): Home  Distant Location (provider location):  Off-site  Platform used for Video Visit: AmWell    Additional 15 minutes on the date of service was spent performing the following:    -Preparing to see the patient  -Obtaining and/or reviewing separately obtained history   -Ordering medications, tests, or procedures   -Documenting clinical information in the electronic or other health record     Thank you for the opportunity to participate in the care of Misbah Floyd.     He is a 54 year old y/o male patient who comes to the sleep medicine clinic for follow up. The patient was diagnosed with CITLALLI on 02/07/2017 (AHI=96.1). The patient states that he continues to benefit from PAP therapy and has no complaints.     Assessment and Plan:  In summary Misbah Floyd is a 54 year old year old male who is here for follow up.    1. CITLALLI (obstructive sleep apnea) (Primary)  I congratulated the patient on his excellent PAP usage. I will keep him on the same pressure settings for now.  - COMPREHENSIVE DME    2. Morbid obesity due to excess calories (H)  Healthy weigh management is recommended as part of the long term goal to improve CITLALLI. I will give the patient a hand out on the topic in the AVS.        Lab reviewed: Discussed with patient.    Sleep-Wake Cycle:    The patient likes to initiate sleep at around 9:30 PM with a sleep latency of 3-5 minutes. The patient has 1-2 nocturnal awakenings. Final wake up time is around 3:30 AM.    BEATRICE:  BEATRICE Total Score: (Proxy-Rptd) 0  Total score - [place]: (Proxy-Rptd) 4 (6/26/2025  3:46 PM)    Failed to redirect to the Timeline version of the [employer] SmartLink.   Patient Active Problem List   Diagnosis    Abscess of anal and rectal regions    Morbid obesity due to excess calories (H)    CITLALLI (obstructive sleep apnea)       No past medical history on file.    Past Surgical History:   Procedure  Laterality Date    EXCISE MASS HIP Right 2/24/2017    Procedure: EXCISE MASS HIP;  Surgeon: Solis Lopes MD;  Location: [facility] OR    HC CYSTOURETHROSCOPY  04/21/2006    Stone basket extraction. Left double-J stent placemnt.       Current Outpatient Medications   Medication Sig Dispense Refill    ciclopirox (PENLAC) 8 % external solution Apply to adjacent skin and affected nails daily.  Remove with alcohol every 7 days, then repeat. 6.6 mL 11    furosemide (LASIX) 20 MG tablet Take 2 tablets (40 mg) by mouth daily. 60 tablet 0    ibuprofen (ADVIL/MOTRIN) 600 MG tablet Take 1 tablet (600 mg) by mouth every 6 hours as needed for moderate pain (Patient not taking: Reported on 5/1/2025) 30 tablet 0    potassium chloride teresa ER (KLOR-CON M20) 20 MEQ CR tablet Take 1 tablet (20 mEq) by mouth daily. 30 tablet 0       Allergies   Allergen Reactions    Bees Swelling    Wasp Venom Protein Other (See Comments)    Amoxicillin Rash     Rash & cellulitis - see 3/16/17 encounter    Bactrim [Sulfamethoxazole-Trimethoprim] Rash     Rash & cellulitis - see 3/16/17 encounter - this was prescribed last of 3 abx after amoxicillin, cephalexin - rash didn't get better - only after Benadryl did improve    Keflex [Cephalexin] Rash     Rash & cellulitis - see 3/16/17 encounter       Visual  Exam:  GEN: NAD,  Psych: normal mood, normal affect    Labs/Studies:    Lab Results   Component Value Date    TSH 1.91 06/06/2025     Lab Results   Component Value Date    GLC 87 06/06/2025    GLC 89 05/16/2025     Lab Results   Component Value Date    HGB 14.7 06/06/2025    HGB 15.2 12/06/2022     Lab Results   Component Value Date    BUN 18.6 06/06/2025    BUN 14.8 05/16/2025    CR 0.85 06/06/2025    CR 0.87 05/16/2025     Lab Results   Component Value Date    AST 19 06/06/2025    AST 26 05/16/2025    ALT 28 06/06/2025    ALT 48 05/16/2025    ALKPHOS 69 06/06/2025    ALKPHOS 68 05/16/2025    BILITOTAL 0.3 06/06/2025    BILITOTAL 0.3 05/16/2025        Recent Labs   Lab Test 06/06/25  1810 05/16/25  1132    138   POTASSIUM 4.0 4.0   CHLORIDE 101 101   CO2 25 24   ANIONGAP 10 13   GLC 87 89   BUN 18.6 14.8   CR 0.85 0.87   DIANA 9.2 9.4     Recent Results (from the past 744 hours)   US Lower Extremity Venous Duplex Left    Narrative    EXAM: US LOWER EXTREMITY VENOUS DUPLEX LEFT  LOCATION: [facility]  DATE: 6/6/2025    INDICATION: swelling, pain  COMPARISON: None.  TECHNIQUE: Venous Duplex ultrasound of the left lower extremity with and without compression, augmentation and duplex. Color flow and spectral Doppler with waveform analysis performed.    FINDINGS: Exam includes the common femoral, femoral, popliteal, and contralateral common femoral veins as well as segmentally visualized deep calf veins and greater saphenous vein.     LEFT: No deep vein thrombosis. No superficial thrombophlebitis. No popliteal cyst.      Impression    IMPRESSION:  1.  No deep venous thrombosis in the left lower extremity.       I reviewed the efficacy and compliance report from his device. Data summarized on the HPI and the PAP compliance flow sheet.     Patient was strongly advised in AVS to avoid driving, operating any heavy machinery or other hazardous situations while drowsy or sleepy. Patient was counseled on the importance of driving while alert, to pull over if drowsy, or nap before getting into the vehicle if sleepy.     Patient verbalized understanding of these issues, agrees with the plan and all questions were answered today. Patient was given an opportuntity to voice any other symptoms or concerns not listed above. Patient did not have any other symptoms or concerns.      Jose Guadalupe Vickers DO  Board Certified in Internal Medicine and Sleep Medicine    (Note created with Dragon voice recognition and unintended spelling errors and word substitutions may occur)     Audio and visual devices were used for this virtual clinic visit with  permission from patient.

## 2025-07-30 ENCOUNTER — NURSE TRIAGE (OUTPATIENT)
Dept: INTERNAL MEDICINE | Facility: CLINIC | Age: 55
End: 2025-07-30
Payer: COMMERCIAL

## 2025-07-30 DIAGNOSIS — R60.0 BILATERAL LOWER EXTREMITY EDEMA: ICD-10-CM

## 2025-07-30 RX ORDER — FUROSEMIDE 20 MG/1
40 TABLET ORAL DAILY
Qty: 60 TABLET | Refills: 0 | Status: SHIPPED | OUTPATIENT
Start: 2025-07-30

## 2025-07-30 RX ORDER — POTASSIUM CHLORIDE 1500 MG/1
20 TABLET, EXTENDED RELEASE ORAL DAILY
Qty: 30 TABLET | Refills: 0 | Status: SHIPPED | OUTPATIENT
Start: 2025-07-30

## 2025-07-30 NOTE — TELEPHONE ENCOUNTER
Yes since ultrasound was done before and negative this is unlikely to be a clot.  We will refill the Lasix and potassium for him.

## 2025-07-30 NOTE — TELEPHONE ENCOUNTER
Nurse Triage SBAR    Is this a 2nd Level Triage? YES, LICENSED PRACTITIONER REVIEW IS REQUIRED    Situation: Patient calling to report leg swelling.     Background: hx of leg swelling, saw Same day on 04/21 and PCP 05/01 for symptoms. Also went to ED 06/06 for same symptoms. Patient has been on furosemide and potassium to help resolve issue. Patient has not followed up from ED visit and has also run out of lasix.     Assessment: Patient states right light is more swollen than left. He denies pain in calf, or warmth to touch. Denies pitting edema when pressed. Says mostly in foot, but also in leg. He did take lasix and potassium as prescribed in ED on 06/06. He says that helped with his swelling. He ran out a few weeks ago and swelling as returned. He denies other acute concerns. He does report watching salt intake since ED visit. He states the swelling improves overnight, but returns as he works throughout the day.     Protocol Recommended Disposition:   Go To Office Now    Recommendation: Patient unilateral swelling, non pitting and ran out of lasix. RN scheduled follow-up with PCP next week. Are we ok to refill medication and wait until visit next week to follow-up? There are no same day appointments left today, and none tomorrow in clinic.     Blake Jones RN on 7/30/2025 at 3:58 PM       Routed to provider    Does the patient meet one of the following criteria for ADS visit consideration? 16+ years old, with an [facility] PCP     TIP  Providers, please consider if this condition is appropriate for management at one of our Acute and Diagnostic Services sites.     If patient is a good candidate, please use dotphrase <dot>triageresponse and select Refer to ADS to document.    [facility].   Reason for Disposition   Thigh, calf, or ankle swelling in both legs, but one side is definitely more swollen (Exception: Longstanding difference between legs.)    Additional Information   Negative: Thigh, calf,  or ankle swelling in only one leg   Negative: SEVERE swelling (e.g., swelling extends above knee, entire leg is swollen, weeping fluid)   Negative: Pregnant 20 or more weeks and sudden weight gain (i.e., > 2 lbs, 1 kg in one week)   Negative: Thigh or calf pain and only 1 side and present > 1 hour   Negative: Cast on leg or ankle and has increasing pain   Negative: Can't walk or can barely stand (new-onset)   Negative: Fever and red area (or area very tender to touch)   Negative: Patient sounds very sick or weak to the triager   Negative: Difficulty breathing at rest   Negative: Entire foot is cool or blue in comparison to other side   Negative: Chest pain   Negative: Followed an insect bite and has localized swelling (e.g., small area of puffy or swollen skin)   Negative: Followed a knee injury   Negative: Ankle injury   Negative: Pregnant with leg swelling or edema   Negative: Sounds like a life-threatening emergency to the triager    Protocols used: Leg Swelling and Edema-A-[place]

## 2025-08-04 ENCOUNTER — OFFICE VISIT (OUTPATIENT)
Dept: INTERNAL MEDICINE | Facility: CLINIC | Age: 55
End: 2025-08-04
Payer: COMMERCIAL

## 2025-08-04 VITALS
RESPIRATION RATE: 16 BRPM | HEART RATE: 59 BPM | SYSTOLIC BLOOD PRESSURE: 136 MMHG | TEMPERATURE: 97.9 F | OXYGEN SATURATION: 97 % | WEIGHT: 315 LBS | BODY MASS INDEX: 47.74 KG/M2 | DIASTOLIC BLOOD PRESSURE: 70 MMHG | HEIGHT: 68 IN

## 2025-08-04 DIAGNOSIS — R60.0 BILATERAL LOWER EXTREMITY EDEMA: Primary | ICD-10-CM

## 2025-08-04 PROCEDURE — G2211 COMPLEX E/M VISIT ADD ON: HCPCS | Performed by: INTERNAL MEDICINE

## 2025-08-04 PROCEDURE — 3078F DIAST BP <80 MM HG: CPT | Performed by: INTERNAL MEDICINE

## 2025-08-04 PROCEDURE — 99213 OFFICE O/P EST LOW 20 MIN: CPT | Performed by: INTERNAL MEDICINE

## 2025-08-04 PROCEDURE — 3075F SYST BP GE 130 - 139MM HG: CPT | Performed by: INTERNAL MEDICINE

## 2025-08-04 PROCEDURE — 1126F AMNT PAIN NOTED NONE PRSNT: CPT | Performed by: INTERNAL MEDICINE

## 2025-08-04 ASSESSMENT — PAIN SCALES - GENERAL: PAINLEVEL_OUTOF10: NO PAIN (0)

## 2025-09-02 DIAGNOSIS — R60.0 BILATERAL LOWER EXTREMITY EDEMA: ICD-10-CM

## 2025-09-02 RX ORDER — POTASSIUM CHLORIDE 1500 MG/1
20 TABLET, EXTENDED RELEASE ORAL DAILY
Qty: 90 TABLET | Refills: 1 | Status: SHIPPED | OUTPATIENT
Start: 2025-09-02

## 2025-09-03 RX ORDER — FUROSEMIDE 20 MG/1
40 TABLET ORAL DAILY
Qty: 60 TABLET | Refills: 1 | Status: SHIPPED | OUTPATIENT
Start: 2025-09-03

## (undated) DEVICE — DRSG STERI STRIP 1/2X4" R1547

## (undated) DEVICE — SU VICRYL 3-0 SH 27" UND J416H

## (undated) DEVICE — TUBING SUCTION 6"X3/16" N56A

## (undated) DEVICE — PACK MINOR PROCEDURE CUSTOM

## (undated) DEVICE — PREP CHLORAPREP 26ML TINTED ORANGE  260815

## (undated) DEVICE — ESU ELEC BLADE HEX-LOCKING 2.5" E1450X

## (undated) DEVICE — KIT ENDO TURNOVER/PROCEDURE CARRY-ON 101822

## (undated) DEVICE — SYR BULB IRRIG DOVER 60 ML LATEX FREE 67000

## (undated) DEVICE — DRSG PRIMAPORE 03 1/8X6" 66000318

## (undated) DEVICE — SOL ADH LIQUID BENZOIN SWAB 0.6ML C1544

## (undated) DEVICE — SOL WATER IRRIG 1000ML BOTTLE 2F7114

## (undated) DEVICE — BLADE KNIFE SURG 15 371115

## (undated) DEVICE — SU ETHILON 4-0 PS-2 18" 1667G

## (undated) DEVICE — GLOVE PROTEXIS W/NEU-THERA 7.5  2D73TE75

## (undated) RX ORDER — LIDOCAINE HYDROCHLORIDE 10 MG/ML
INJECTION, SOLUTION EPIDURAL; INFILTRATION; INTRACAUDAL; PERINEURAL
Status: DISPENSED
Start: 2017-02-24

## (undated) RX ORDER — DIMENHYDRINATE 50 MG/ML
INJECTION, SOLUTION INTRAMUSCULAR; INTRAVENOUS
Status: DISPENSED
Start: 2017-02-24

## (undated) RX ORDER — LIDOCAINE HYDROCHLORIDE AND EPINEPHRINE 10; 10 MG/ML; UG/ML
INJECTION, SOLUTION INFILTRATION; PERINEURAL
Status: DISPENSED
Start: 2017-02-24

## (undated) RX ORDER — LIDOCAINE HYDROCHLORIDE 20 MG/ML
INJECTION, SOLUTION EPIDURAL; INFILTRATION; INTRACAUDAL; PERINEURAL
Status: DISPENSED
Start: 2017-02-24

## (undated) RX ORDER — ONDANSETRON 2 MG/ML
INJECTION INTRAMUSCULAR; INTRAVENOUS
Status: DISPENSED
Start: 2017-02-24

## (undated) RX ORDER — PROPOFOL 10 MG/ML
INJECTION, EMULSION INTRAVENOUS
Status: DISPENSED
Start: 2017-02-24

## (undated) RX ORDER — FENTANYL CITRATE 50 UG/ML
INJECTION, SOLUTION INTRAMUSCULAR; INTRAVENOUS
Status: DISPENSED
Start: 2017-02-24